# Patient Record
Sex: FEMALE | Race: WHITE | NOT HISPANIC OR LATINO | Employment: FULL TIME | URBAN - NONMETROPOLITAN AREA
[De-identification: names, ages, dates, MRNs, and addresses within clinical notes are randomized per-mention and may not be internally consistent; named-entity substitution may affect disease eponyms.]

---

## 2020-01-03 ENCOUNTER — OFFICE VISIT (OUTPATIENT)
Dept: URGENT CARE | Facility: CLINIC | Age: 45
End: 2020-01-03
Payer: COMMERCIAL

## 2020-01-03 VITALS
HEART RATE: 67 BPM | SYSTOLIC BLOOD PRESSURE: 100 MMHG | HEIGHT: 66 IN | DIASTOLIC BLOOD PRESSURE: 74 MMHG | OXYGEN SATURATION: 96 % | WEIGHT: 227 LBS | RESPIRATION RATE: 14 BRPM | TEMPERATURE: 97.7 F | BODY MASS INDEX: 36.48 KG/M2

## 2020-01-03 DIAGNOSIS — B96.89 ACUTE BACTERIAL SINUSITIS: ICD-10-CM

## 2020-01-03 DIAGNOSIS — R05.9 COUGH: ICD-10-CM

## 2020-01-03 DIAGNOSIS — J01.90 ACUTE BACTERIAL SINUSITIS: ICD-10-CM

## 2020-01-03 PROCEDURE — 99204 OFFICE O/P NEW MOD 45 MIN: CPT | Performed by: FAMILY MEDICINE

## 2020-01-03 RX ORDER — LEVOTHYROXINE SODIUM 0.15 MG/1
TABLET ORAL
COMMUNITY
Start: 2019-12-20 | End: 2022-10-11

## 2020-01-03 RX ORDER — BUPROPION HYDROCHLORIDE 150 MG/1
300 TABLET ORAL EVERY MORNING
COMMUNITY
Start: 2019-12-20 | End: 2023-07-19

## 2020-01-03 RX ORDER — PROMETHAZINE HYDROCHLORIDE AND CODEINE PHOSPHATE 6.25; 1 MG/5ML; MG/5ML
SYRUP ORAL
Qty: 140 ML | Refills: 0 | Status: SHIPPED | OUTPATIENT
Start: 2020-01-03 | End: 2020-01-10

## 2020-01-03 RX ORDER — AZITHROMYCIN 250 MG/1
TABLET, FILM COATED ORAL
Qty: 6 TAB | Refills: 0 | Status: SHIPPED | OUTPATIENT
Start: 2020-01-03 | End: 2022-10-11

## 2020-01-04 NOTE — PROGRESS NOTES
Chief Complaint:    Chief Complaint   Patient presents with   • Cough   • Sinus Problem       History of Present Illness:    This is a new problem. Symptoms x 2 weeks. Has subjective fever, body aches, discomfort in sinus regions, post-nasal drainage, and cough (interferes with sleep). Using Nyquil with sub-optimal help. Overall at least moderate severity and not getting better. She gets similar symptoms about once a year - she recently moved back from [place] and was being treated there. Z-abraham and Promethazine-Codeine have worked/tolerated in past for similar symptoms.      Review of Systems:    Constitutional: See HPI.  Eyes: Negative for change in vision, photophobia, pain, redness, and discharge.  ENT: See HPI.  Respiratory: See HPI.  Cardiovascular: Negative for chest pain, palpitations, orthopnea, claudication, leg swelling, and PND.   Gastrointestinal: Negative for abdominal pain, nausea, vomiting, diarrhea, constipation, blood in stool, and melena.   Genitourinary: Negative for dysuria, urinary urgency, urinary frequency, hematuria, and flank pain.   Musculoskeletal: See HPI.  Skin: Negative for rash and itching.   Neurological: Negative for dizziness, tingling, tremors, sensory change, speech change, focal weakness, seizures, and loss of consciousness.   Endo: Hypothyroid, on medication.  Heme: Does not bruise/bleed easily.   Psychiatric/Behavioral: No new symptoms.       Past Medical History:    Past Medical History:   Diagnosis Date   • Post-surgical hypothyroidism 2012    Thyroid cancer - papillary      Past Surgical History:    Past Surgical History:   Procedure Laterality Date   • CHOLECYSTECTOMY  2009   • THYROIDECTOMY       Social History:    Social History     Socioeconomic History   • Marital status: Single     Spouse name: Not on file   • Number of children: Not on file   • Years of education: Not on file   • Highest education level: Not on file   Occupational History     Comment: [employer]      Social Needs   • Financial resource strain: Not on file   • Food insecurity:     Worry: Not on file     Inability: Not on file   • Transportation needs:     Medical: Not on file     Non-medical: Not on file   Tobacco Use   • Smoking status: Former Smoker     Packs/day: 0.00     Types: Cigarettes     Last attempt to quit: 1/16/2009     Years since quitting: 10.9   • Smokeless tobacco: Never Used   • Tobacco comment: social   Substance and Sexual Activity   • Alcohol use: Yes     Comment: occ   • Drug use: No   • Sexual activity: Not on file     Comment: singe   Lifestyle   • Physical activity:     Days per week: Not on file     Minutes per session: Not on file   • Stress: Not on file   Relationships   • Social connections:     Talks on phone: Not on file     Gets together: Not on file     Attends Voodoo service: Not on file     Active member of club or organization: Not on file     Attends meetings of clubs or organizations: Not on file     Relationship status: Not on file   • Intimate partner violence:     Fear of current or ex partner: Not on file     Emotionally abused: Not on file     Physically abused: Not on file     Forced sexual activity: Not on file   Other Topics Concern   • Not on file   Social History Narrative   • Not on file     Family History:    Family History   Problem Relation Age of Onset   • Hyperlipidemia Mother    • Hyperlipidemia Father    • Psychiatric Illness Father         depression   • Cancer Maternal Uncle         adrenal    • Cancer Maternal Grandmother         breast, lung   • Heart Disease Maternal Grandfather    • Hypertension Maternal Grandfather    • Hyperlipidemia Maternal Grandfather    • Diabetes Paternal Grandmother    • Heart Disease Paternal Grandfather    • Hypertension Paternal Grandfather    • Hyperlipidemia Paternal Grandfather    • Stroke Neg Hx      Medications:    Current Outpatient Medications on File Prior to Visit   Medication Sig Dispense  "Refill   • buPROPion (WELLBUTRIN XL) 150 MG XL tablet TK 1 T PO QD     • levothyroxine (SYNTHROID) 150 MCG Tab TK 1 T PO QD       No current facility-administered medications on file prior to visit.      Allergies:    Allergies   Allergen Reactions   • Morphine Hives       Vitals:    Vitals:    01/03/20 1556   BP: 100/74   BP Location: Right arm   Patient Position: Sitting   Pulse: 67   Resp: 14   Temp: 36.5 °C (97.7 °F)   SpO2: 96%   Weight: 103 kg (227 lb)   Height: 1.676 m (5' 6\")       Physical Exam:    Constitutional: Vital signs reviewed. Appears well-developed and well-nourished. No acute distress.   Eyes: Sclera white, conjunctivae clear.  ENT: TTP bilateral frontal and maxillary sinus regions. External ears normal. External auditory canals normal without discharge. TMs translucent and non-bulging. Hearing normal. Nasal mucosa erythematous. Lips/teeth are normal. Oral mucosa pink and moist. Posterior pharynx: WNL.  Neck: Neck supple.   Cardiovascular: Regular rate and rhythm. No murmur.  Pulmonary/Chest: Respirations non-labored. Clear to auscultation bilaterally.  Lymph: Cervical nodes without tenderness or enlargement.  Musculoskeletal: Normal gait. Normal range of motion. No muscular atrophy or weakness.  Neurological: Alert and oriented to person, place, and time. Muscle tone normal. Coordination normal.   Skin: No rashes or lesions. Warm, dry, normal turgor.  Psychiatric: Normal mood and affect. Behavior is normal. Judgment and thought content normal.       Assessment / Plan:    1. Acute bacterial sinusitis  - azithromycin (ZITHROMAX) 250 MG Tab; 2 TABS BY MOUTH ON DAY 1, 1 TAB ON DAYS 2-5.  Dispense: 6 Tab; Refill: 0    2. Cough  - promethazine-codeine (PHENERGAN-CODEINE) 6.25-10 MG/5ML Syrup; 5 ML BY MOUTH EVERY 6 HOURS ONLY IF NEEDED FOR COUGH FOR UP TO 7 DAYS. MAY CAUSE DROWSINESS.  Dispense: 140 mL; Refill: 0      Discussed with her DDX, management options, and risks, benefits, and alternatives to " treatment plan agreed upon.    Agreeable to medications prescribed.  report checked - no Rx x 3 years.    Discussed expected course of duration, time for improvement, and to seek follow-up in Emergency Room, urgent care, or with PCP if getting worse at any time or not improving within expected time frame.

## 2022-10-11 ENCOUNTER — TELEPHONE (OUTPATIENT)
Dept: SCHEDULING | Facility: IMAGING CENTER | Age: 47
End: 2022-10-11

## 2022-10-11 ENCOUNTER — OFFICE VISIT (OUTPATIENT)
Dept: MEDICAL GROUP | Facility: LAB | Age: 47
End: 2022-10-11
Payer: COMMERCIAL

## 2022-10-11 VITALS
WEIGHT: 222.9 LBS | RESPIRATION RATE: 14 BRPM | TEMPERATURE: 97.3 F | OXYGEN SATURATION: 95 % | BODY MASS INDEX: 35.82 KG/M2 | HEIGHT: 66 IN | DIASTOLIC BLOOD PRESSURE: 74 MMHG | HEART RATE: 72 BPM | SYSTOLIC BLOOD PRESSURE: 116 MMHG

## 2022-10-11 DIAGNOSIS — F32.A ANXIETY AND DEPRESSION: ICD-10-CM

## 2022-10-11 DIAGNOSIS — T75.3XXA MOTION SICKNESS, INITIAL ENCOUNTER: ICD-10-CM

## 2022-10-11 DIAGNOSIS — F41.9 ANXIETY AND DEPRESSION: ICD-10-CM

## 2022-10-11 DIAGNOSIS — Z12.4 SCREENING FOR CERVICAL CANCER: ICD-10-CM

## 2022-10-11 DIAGNOSIS — G43.119 INTRACTABLE MIGRAINE WITH AURA WITHOUT STATUS MIGRAINOSUS: ICD-10-CM

## 2022-10-11 DIAGNOSIS — M26.621 ARTHRALGIA OF RIGHT TEMPOROMANDIBULAR JOINT: ICD-10-CM

## 2022-10-11 DIAGNOSIS — K21.9 GASTROESOPHAGEAL REFLUX DISEASE WITHOUT ESOPHAGITIS: ICD-10-CM

## 2022-10-11 DIAGNOSIS — E89.0 POST-SURGICAL HYPOTHYROIDISM: ICD-10-CM

## 2022-10-11 PROBLEM — M26.629 TMJ ARTHRALGIA: Status: ACTIVE | Noted: 2022-10-11

## 2022-10-11 PROBLEM — C73 THYROID CANCER (HCC): Status: ACTIVE | Noted: 2020-11-17

## 2022-10-11 PROCEDURE — 99214 OFFICE O/P EST MOD 30 MIN: CPT | Performed by: NURSE PRACTITIONER

## 2022-10-11 RX ORDER — METHYLPREDNISOLONE 4 MG/1
TABLET ORAL
COMMUNITY
Start: 2022-10-06 | End: 2022-10-11

## 2022-10-11 RX ORDER — CETIRIZINE HYDROCHLORIDE 10 MG/1
10 TABLET ORAL EVERY MORNING
COMMUNITY

## 2022-10-11 RX ORDER — DICYCLOMINE HYDROCHLORIDE 10 MG/1
10 CAPSULE ORAL EVERY 6 HOURS PRN
COMMUNITY

## 2022-10-11 RX ORDER — SCOLOPAMINE TRANSDERMAL SYSTEM 1 MG/1
1 PATCH, EXTENDED RELEASE TRANSDERMAL
Qty: 4 PATCH | Refills: 0 | Status: SHIPPED | OUTPATIENT
Start: 2022-10-11 | End: 2023-03-02

## 2022-10-11 RX ORDER — LIOTHYRONINE SODIUM 5 UG/1
5 TABLET ORAL EVERY MORNING
COMMUNITY
Start: 2022-10-10

## 2022-10-11 RX ORDER — PANTOPRAZOLE SODIUM 40 MG/1
40 TABLET, DELAYED RELEASE ORAL
COMMUNITY
End: 2023-07-19

## 2022-10-11 RX ORDER — AMOXICILLIN 500 MG/1
CAPSULE ORAL
COMMUNITY
Start: 2022-10-06 | End: 2022-11-30

## 2022-10-11 RX ORDER — GABAPENTIN 100 MG/1
100 CAPSULE ORAL 2 TIMES DAILY PRN
Qty: 20 CAPSULE | Refills: 0 | Status: SHIPPED | OUTPATIENT
Start: 2022-10-11 | End: 2022-10-18 | Stop reason: SDUPTHER

## 2022-10-11 RX ORDER — VALACYCLOVIR HYDROCHLORIDE 1 G/1
1000 TABLET, FILM COATED ORAL 3 TIMES DAILY
Qty: 21 TABLET | Refills: 0 | Status: SHIPPED | OUTPATIENT
Start: 2022-10-11 | End: 2022-10-18

## 2022-10-11 RX ORDER — LEVOTHYROXINE SODIUM 175 UG/1
175 TABLET ORAL SEE ADMIN INSTRUCTIONS
COMMUNITY
Start: 2022-08-11

## 2022-10-11 ASSESSMENT — PATIENT HEALTH QUESTIONNAIRE - PHQ9: CLINICAL INTERPRETATION OF PHQ2 SCORE: 0

## 2022-10-11 NOTE — ASSESSMENT & PLAN NOTE
This is a chronic condition. Patient is currently taking wellbutrin  mg daily with relief, denies side effects. She will also take trazodone 50 mg nightly as needed to help with insomnia due to anxiety.  Denies suicidal or homicidal ideation.  No complaints at this time.

## 2022-10-11 NOTE — ASSESSMENT & PLAN NOTE
This is a chronic condition. Currently taking pantoprazole 40 mg daily with relief. Currently followed by gastroenterology. Patient denies heartburn, nausea, vomiting, unintentional weight loss, or fatigue at this time.

## 2022-10-11 NOTE — ASSESSMENT & PLAN NOTE
Followed by endocrinology. Taking medicine as directed. Denies palpitations, skin changes, temperature intolerance, changes in bowel habits.

## 2022-10-11 NOTE — ASSESSMENT & PLAN NOTE
This is a chronic condition, new to me. Patient will take imitrex 100 mg as abortive therapy. Will also take zofran as needed. Reports that she has not had a migraine in about a year now.

## 2022-10-11 NOTE — PROGRESS NOTES
"Subjective:     CC:    Chief Complaint   Patient presents with    Establish Care    Jaw Pain     R side     Medication Management     HISTORY OF THE PRESENT ILLNESS: Patient is a 47 y.o. female. This pleasant patient is here today to establish care and discuss the following:    TMJ arthralgia  Onset Tuesday. She was seen at the dentist, she had a filling, but overnight the pain got progressively worse.   She went back to the dentist, they were unable to find anything, but was sent to the oral surgeon for a possible vertical root fracture.   She was seen at the oral surgeon, but there was no indication of root fracture and did not recommend extraction.  She was seen at the endodontist, who also did not find any abnormalities.   There is now concern for trigeminal neuralgia.   Symptoms include sharp \"electrical\" pain along the right jaw line.   Now she is having a dull ache in her chin on the right side. Pain is constant, with worsening flares. There is numbness and tingling. Skin is also tender. Denies pain with tooth brushing, no sensitivity to hot or cold. Does feel pain with yawning. No rash, palsy.   She does not clench her teeth at night.  Denies any recent illness, fever, chills, N/V.   Oral surgeon prescribed an antibiotic and steroid, which she finished today.     Post-surgical hypothyroidism  Followed by endocrinology. Taking medicine as directed. Denies palpitations, skin changes, temperature intolerance, changes in bowel habits.    Migraines  This is a chronic condition, new to me. Patient will take imitrex 100 mg as abortive therapy. Will also take zofran as needed. Reports that she has not had a migraine in about a year now.     Anxiety and depression  This is a chronic condition. Patient is currently taking wellbutrin  mg daily with relief, denies side effects. She will also take trazodone 50 mg nightly as needed to help with insomnia due to anxiety.  Denies suicidal or homicidal ideation.  No " "complaints at this time.    Gastroesophageal reflux disease without esophagitis  This is a chronic condition. Currently taking pantoprazole 40 mg daily with relief. Currently followed by gastroenterology. Patient denies heartburn, nausea, vomiting, unintentional weight loss, or fatigue at this time.    ROS:   As documented in history of present illness above      Objective:     Exam: /74 (BP Location: Left arm, Patient Position: Sitting, BP Cuff Size: Adult)   Pulse 72   Temp 36.3 °C (97.3 °F)   Resp 14   Ht 1.676 m (5' 6\")   Wt 101 kg (222 lb 14.4 oz)   SpO2 95%  Body mass index is 35.98 kg/m².    Constitutional: Alert, no distress, well-groomed.  Skin: Warm, dry, good turgor, no rashes in visible areas.  Eye: Equal, round and reactive, conjunctiva clear, lids normal.  ENMT: Lips without lesions, good dentition, moist mucous membranes.  Neck: Trachea midline, no masses, no thyromegaly.  Respiratory: Unlabored respiratory effort, no cough. Clear to ausculation. No rales, ronchi, or wheezing.  Cardiovascular: Regular rate and rhythm without murmur. Carotid and radial pulses are intact and equal bilaterally.  Neuro: CN II-XII intact, strength 5/5 in all muscle groups, sensation intact bilaterally  MSK: Normal gait, moves all extremities.  Neuro: Grossly non-focal.   Psych: Alert and oriented x3, normal affect and mood.    Assessment & Plan:   47 y.o. female with the following -    1. Arthralgia of right temporomandibular joint  Patient to take medication as prescribed. Side effects of medication prescribed today were discussed with the patient including how to take the medication and proper dosage. Discussed repercussions of not taking the medication as prescribed. Instructed to call the office should she have any negative side effects or problems with the medication. Supportive care, differential diagnoses, and indications for immediate follow-up discussed with patient. Instructed to return to clinic or " nearest emergency department for any change in condition, further concerns, or worsening of symptoms.  - valacyclovir (VALTREX) 1 GM Tab; Take 1 Tablet by mouth 3 times a day for 7 days.  Dispense: 21 Tablet; Refill: 0  - gabapentin (NEURONTIN) 100 MG Cap; Take 1 Capsule by mouth 2 times a day as needed (pain).  Dispense: 20 Capsule; Refill: 0    2. Motion sickness, initial encounter  Patient will be going on a cruise and would like a refill of scopolamine patches for motion sickness.   - scopolamine (TRANSDERM-SCOP, 1.5 MG,) 1 mg/72hr PATCH 72 HR; Place 1 Patch on the skin every 72 hours.  Dispense: 4 Patch; Refill: 0    3. Post-surgical hypothyroidism  Continue thyroid medication.  Instructed patient to take on empty stomach with glass of water, 30 minutes prior to food or other medications.  Labs as indicated.  Continue to follow-up with endocrinology.    4. Intractable migraine with aura without status migrainosus  Chronic, stable condition.  Patient to take medication as prescribed. Encourage patient to keep a headache diary to identify triggers.  Low tyramine diet recommended.  Decrease caffeine, maintain regular sleep schedule and at least 30-minutes of exercise most days.    5. Anxiety and depression  Patient is feeling well on current medications.  Will continue.  Denies any suicidal or homicidal ideation. Discussed that should the patient have any symptoms they should call suicide prevention hotline or report to the emergency room immediately. Emphasized importance of healthy diet and exercise.    - CBC WITH DIFFERENTIAL; Future  - Comp Metabolic Panel; Future  - Lipid Profile; Future  - VITAMIN D,25 HYDROXY (DEFICIENCY); Future    6. Gastroesophageal reflux disease without esophagitis  This is a chronic and stable problem.  Patient is doing well.  No red flags.  Continue PPI and monitor.  - CBC WITH DIFFERENTIAL; Future  - Comp Metabolic Panel; Future  - Lipid Profile; Future  - VITAMIN D,25 HYDROXY  (DEFICIENCY); Future    7. Screening for cervical cancer  Referral placed to gynecology.  - Referral to Gynecology    Please note that this dictation was created using voice recognition software. I have made every reasonable attempt to correct obvious errors, but I expect that there are errors of grammar and possibly content that I did not discover before finalizing the note.

## 2022-10-11 NOTE — ASSESSMENT & PLAN NOTE
"Onset Tuesday. She was seen at the dentist, she had a filling, but overnight the pain got progressively worse.   She went back to the dentist, they were unable to find anything, but was sent to the oral surgeon for a possible vertical root fracture.   She was seen at the oral surgeon, but there was no indication of root fracture and did not recommend extraction.  She was seen at the endodontist, who also did not find any abnormalities.   There is now concern for trigeminal neuralgia.   Symptoms include sharp \"electrical\" pain along the right jaw line.   Now she is having a dull ache in her chin on the right side. Pain is constant, with worsening flares. There is numbness and tingling. Skin is also tender. Denies pain with tooth brushing, no sensitivity to hot or cold. Does feel pain with yawning. No rash, palsy.   She does not clench her teeth at night.  Denies any recent illness, fever, chills, N/V.   Oral surgeon prescribed an antibiotic and steroid, which she finished today.   "

## 2022-10-13 ENCOUNTER — PATIENT MESSAGE (OUTPATIENT)
Dept: MEDICAL GROUP | Facility: LAB | Age: 47
End: 2022-10-13
Payer: COMMERCIAL

## 2022-10-13 DIAGNOSIS — G50.0 TRIGEMINAL NEURALGIA OF RIGHT SIDE OF FACE: ICD-10-CM

## 2022-10-13 DIAGNOSIS — M26.621 ARTHRALGIA OF RIGHT TEMPOROMANDIBULAR JOINT: ICD-10-CM

## 2022-10-18 ENCOUNTER — OFFICE VISIT (OUTPATIENT)
Dept: MEDICAL GROUP | Facility: LAB | Age: 47
End: 2022-10-18
Payer: COMMERCIAL

## 2022-10-18 VITALS
SYSTOLIC BLOOD PRESSURE: 124 MMHG | BODY MASS INDEX: 36.48 KG/M2 | TEMPERATURE: 97.7 F | OXYGEN SATURATION: 96 % | HEART RATE: 74 BPM | RESPIRATION RATE: 14 BRPM | WEIGHT: 227 LBS | DIASTOLIC BLOOD PRESSURE: 76 MMHG | HEIGHT: 66 IN

## 2022-10-18 DIAGNOSIS — M26.621 ARTHRALGIA OF RIGHT TEMPOROMANDIBULAR JOINT: ICD-10-CM

## 2022-10-18 DIAGNOSIS — G50.0 TRIGEMINAL NEURALGIA OF RIGHT SIDE OF FACE: ICD-10-CM

## 2022-10-18 PROCEDURE — 99214 OFFICE O/P EST MOD 30 MIN: CPT | Performed by: NURSE PRACTITIONER

## 2022-10-18 RX ORDER — CYCLOBENZAPRINE HCL 5 MG
5 TABLET ORAL DAILY
Qty: 30 TABLET | Refills: 0 | Status: SHIPPED | OUTPATIENT
Start: 2022-10-18 | End: 2022-11-02

## 2022-10-18 RX ORDER — GABAPENTIN 100 MG/1
100 CAPSULE ORAL 3 TIMES DAILY
Qty: 90 CAPSULE | Refills: 1 | Status: SHIPPED | OUTPATIENT
Start: 2022-10-18 | End: 2022-12-27

## 2022-10-18 NOTE — PROGRESS NOTES
"Subjective:     CC:   Chief Complaint   Patient presents with    Follow-Up     Jaw pain      HPI:   Ifrah presents today with the following:    TMJ arthralgia  Follow up. Patient reports that since starting the antiviral, she has noticed a decrease in symptoms. She reports that symptoms are now intermittent, but when it flares it is more painful.   She was also prescribed gabapentin which has helped some as well.   She is feeling a burning pain in different areas, sometimes by her jaw, sometimes on her lips.   There is numbness/tingling, skin is tender.   Denies rash, palsy.   She does not clench teeth at night, but does report that she might clench when she is stressed.   Denies recent illness, fever, chills, N/V.     ROS:   As documented in history of present illness above    Objective:     Exam: /76 (BP Location: Right arm, Patient Position: Sitting, BP Cuff Size: Adult)   Pulse 74   Temp 36.5 °C (97.7 °F)   Resp 14   Ht 1.676 m (5' 6\")   Wt 103 kg (227 lb)   SpO2 96%  Body mass index is 36.64 kg/m².    Constitutional: Alert, no distress, well-groomed.  Skin: Warm, dry, good turgor, no rashes in visible areas.  Eye: Equal, round and reactive, conjunctiva clear, lids normal.  ENMT: Lips without lesions, good dentition, moist mucous membranes.  Neck: Trachea midline, no masses, no thyromegaly.  Respiratory: Unlabored respiratory effort, no cough.  MSK: Normal gait, moves all extremities.  Neuro: CN II-XII intact, strength 5/5 in all muscle groups, sensation intact bilaterally, coordination intact bilaterally  Psych: Alert and oriented x3, normal affect and mood.    Assessment & Plan:     47 y.o. female with the following -     1. Arthralgia of right temporomandibular joint  2. Trigeminal neuralgia of right side of face  Patient to take medication as prescribed. Side effects of medication prescribed today were discussed with the patient including how to take the medication and proper dosage. Discussed " repercussions of not taking the medication as prescribed. Instructed to call the office should she have any negative side effects or problems with the medication. Supportive care, differential diagnoses, and indications for immediate follow-up discussed with patient. Instructed to return to clinic or nearest emergency department for any change in condition, further concerns, or worsening of symptoms.  - gabapentin (NEURONTIN) 100 MG Cap; Take 1 Capsule by mouth 3 times a day for 60 days.  Dispense: 90 Capsule; Refill: 1  - cyclobenzaprine (FLEXERIL) 5 mg tablet; Take 1 Tablet by mouth every day.  Dispense: 30 Tablet; Refill: 0  - Referral to Neurology

## 2022-10-18 NOTE — ASSESSMENT & PLAN NOTE
Follow up. Patient reports that since starting the antiviral, she has noticed a decrease in symptoms. She reports that symptoms are now intermittent, but when it flares it is more painful.   She was also prescribed gabapentin which has helped some as well.   She is feeling a burning pain in different areas, sometimes by her jaw, sometimes on her lips.   There is numbness/tingling, skin is tender.   Denies rash, palsy.   She does not clench teeth at night, but does report that she might clench when she is stressed.   Denies recent illness, fever, chills, N/V.

## 2022-11-02 DIAGNOSIS — M26.621 ARTHRALGIA OF RIGHT TEMPOROMANDIBULAR JOINT: ICD-10-CM

## 2022-11-02 DIAGNOSIS — G50.0 TRIGEMINAL NEURALGIA OF RIGHT SIDE OF FACE: ICD-10-CM

## 2022-11-02 RX ORDER — BACLOFEN 10 MG/1
10 TABLET ORAL 3 TIMES DAILY
Qty: 90 TABLET | Refills: 1 | Status: SHIPPED | OUTPATIENT
Start: 2022-11-02 | End: 2022-11-30

## 2022-11-07 RX ORDER — CARBAMAZEPINE 100 MG/1
100 TABLET, EXTENDED RELEASE ORAL 2 TIMES DAILY
Qty: 60 TABLET | Refills: 3 | Status: SHIPPED | OUTPATIENT
Start: 2022-11-07 | End: 2022-11-30 | Stop reason: SDUPTHER

## 2022-11-30 ENCOUNTER — HOSPITAL ENCOUNTER (OUTPATIENT)
Dept: LAB | Facility: MEDICAL CENTER | Age: 47
End: 2022-11-30
Attending: PSYCHIATRY & NEUROLOGY
Payer: COMMERCIAL

## 2022-11-30 ENCOUNTER — OFFICE VISIT (OUTPATIENT)
Dept: NEUROLOGY | Facility: MEDICAL CENTER | Age: 47
End: 2022-11-30
Attending: PSYCHIATRY & NEUROLOGY
Payer: COMMERCIAL

## 2022-11-30 VITALS
DIASTOLIC BLOOD PRESSURE: 89 MMHG | BODY MASS INDEX: 38.27 KG/M2 | RESPIRATION RATE: 14 BRPM | HEART RATE: 81 BPM | HEIGHT: 66 IN | WEIGHT: 238.1 LBS | SYSTOLIC BLOOD PRESSURE: 130 MMHG | OXYGEN SATURATION: 96 % | TEMPERATURE: 98.8 F

## 2022-11-30 DIAGNOSIS — G50.0 TRIGEMINAL NEURALGIA OF RIGHT SIDE OF FACE: ICD-10-CM

## 2022-11-30 LAB — ERYTHROCYTE [SEDIMENTATION RATE] IN BLOOD BY WESTERGREN METHOD: 4 MM/HOUR (ref 0–25)

## 2022-11-30 PROCEDURE — 36415 COLL VENOUS BLD VENIPUNCTURE: CPT

## 2022-11-30 PROCEDURE — 83516 IMMUNOASSAY NONANTIBODY: CPT

## 2022-11-30 PROCEDURE — 85652 RBC SED RATE AUTOMATED: CPT

## 2022-11-30 PROCEDURE — 86225 DNA ANTIBODY NATIVE: CPT

## 2022-11-30 PROCEDURE — 86235 NUCLEAR ANTIGEN ANTIBODY: CPT | Mod: 91

## 2022-11-30 PROCEDURE — 99205 OFFICE O/P NEW HI 60 MIN: CPT | Performed by: PSYCHIATRY & NEUROLOGY

## 2022-11-30 PROCEDURE — 99212 OFFICE O/P EST SF 10 MIN: CPT | Performed by: PSYCHIATRY & NEUROLOGY

## 2022-11-30 RX ORDER — CARBAMAZEPINE 100 MG/1
200 TABLET, EXTENDED RELEASE ORAL 2 TIMES DAILY
Qty: 60 TABLET | Refills: 3
Start: 2022-11-30 | End: 2022-12-13 | Stop reason: SDUPTHER

## 2022-11-30 ASSESSMENT — ENCOUNTER SYMPTOMS
DIZZINESS: 0
HEADACHES: 0
FOCAL WEAKNESS: 0
SEIZURES: 0
LOSS OF CONSCIOUSNESS: 0
MEMORY LOSS: 0
SPEECH CHANGE: 1
FALLS: 0
SENSORY CHANGE: 1
TREMORS: 0

## 2022-11-30 NOTE — PROGRESS NOTES
Subjective     Ifrah Kelly is a 47 y.o. female who presents with her  Robinson, from the office of JIMMIE Woods, for consultation, with a 2-month history of progressive right facial pain suggestive of trigeminal neuralgia.     THOR Flores is a pleasant, though unfortunate, 47-year-old right-handed young woman who symptoms started spontaneously in the morning while she was brushing her teeth.  About 2 months ago, she noted a hypersensitivity over the right cheek tracing the mandible.  The pain was lancinating and excruciating in intensity, made worse with brushing her teeth as well as talking, chewing, gritting her teeth and even exposure to ambient cold temperatures.  She notes the sensitivities over time.  Symptoms have never really resolved completely.    There was never any rash or trauma.  Symptoms now seem to have spread proximally and can radiate up to the angle of the jaw ipsilaterally.  He can also involve the maxillary region and upper lip.  The longer she talks or chews, she notes some of the symptom intensity attenuates (acclamation).    She denies any symptoms on the left side of the face, nor ipsilateral autonomic symptoms, headache, tinnitus, hearing changes, focal motor or sensory disturbances below the neck, etc.  There is no history of similar symptoms in the past.  The ophthalmic division of the nerve has never been involved.    She is pending MRI of the brain, she was treated with Flexeril and baclofen which provided no benefit, she was also given valacyclovir with the presumption that this could be shingles or rupture.  This provided no benefit.  Started on gabapentin 100 mg 3 times daily, she noted some minimal benefit, Tegretol 100 mg twice daily was then added and there has been some additional improvement seen.  She now presents.    She has a history of thyroid cancer and Hashimoto's disease, migraine headache, depression, no history of MS, seizure, neurodegenerative disease,  "diabetes, other autoimmune disease, liver or kidney disease, glaucoma, blood dyscrasia, CAD, PVD, or CVA.    There is no surgical history of note from my standpoint though she did have some left shoulder pain as a sequelae of her thyroidectomy.  The symptoms have not changed.    Her last menses was in 2015 after she underwent ablation for endometriosis.    No one in the family has a history of similar symptoms or diagnosis of trigeminal neuralgia, MS or stroke.  His maternal grandmother and son both suffer from migraine, her brother suffers from cluster headache.    She does not smoke, occasionally drinks alcohol.  She is the  for the 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    She is on gabapentin 100 mg, 3 times daily, Tegretol 100 mg, twice daily, Wellbutrin XL, Zyrtec, Bentyl, Synthroid, Cytomel, and Protonix.    Review of Systems   Constitutional:  Negative for malaise/fatigue.   HENT:  Negative for ear pain, hearing loss and tinnitus.    Musculoskeletal:  Negative for falls.   Neurological:  Positive for sensory change and speech change. Negative for dizziness, tremors, focal weakness, seizures, loss of consciousness and headaches.   Psychiatric/Behavioral:  Negative for memory loss.    All other systems reviewed and are negative.    Objective     /89 (BP Location: Right arm, Patient Position: Sitting, BP Cuff Size: Adult)   Pulse 81   Temp 37.1 °C (98.8 °F) (Temporal)   Resp 14   Ht 1.676 m (5' 6\")   Wt 108 kg (238 lb 1.6 oz)   SpO2 96%   BMI 38.43 kg/m²      Physical Exam    She appears in some mild distress related to the pain she suffers from every time she talks.  Still, she is cooperative.  Vital signs are stable.  There is no malar rash, proximal jaw or temporal tenderness, though there is tenderness of the distal maxillary and mandibular, perioral regions on the right side of the face.  The external auditory canal on the right is clear.  " There is buildup of cerumen. Chvostek sign is absent bilaterally.  The neck is supple, range of motion is full.  Cardiac evaluation reveals a regular rhythm.  There is no evidence of diffuse joint swelling, rash, or edema.     Neurological Exam    Fully oriented, there is no aphasia, apraxia, or inattention.    PERRLA/EOMI, visual fields are full to move the section on confrontation bilaterally.  There is tenderness and sensitivity of the skin on the right side of the face periorally, there can be radiation proximally up the V3 distribution to the TMJ.  Jaw movements are limited because of pain, grimace as well.  Lip apposition is intact.  Facial movements overall are symmetric.  Sensory exam is intact to temperature, pinprick and light touch.  Shoulder shrug and head rotation are normal.    Musculoskeletal exam reveals normal tone bilaterally, there is no tremor, asterixis, or drift.  Strength is intact at 5/5 throughout.  Reflexes are brisk and present throughout, there are no asymmetries, both toes are downgoing.    She stands easily, armswing is symmetric, gait is normal and station and stride length.  There is no appendicular dystaxia.  Repetitive movements are intact in all 4 extremities.    Sensory exam is intact to vibration, temperature and pinprick.  Romberg is absent.    Assessment & Plan     1. Trigeminal neuralgia of right side of face  Though the symptom presentation is a little unusual, onset of a distal nature with proximal radiation up the V3 and now V2 distributions of the trigeminal nerve, the exacerbating factors, the nature of the pain itself, all suggest trigeminal neuralgia.  This is not unimportant since it can affect decisions moving forwards about symptomatic relief, specifically neuroablative procedures such as CyberKnife, etc.  Compressive lesions do need to be checked, MRA of the head does need to be added to the MRI of the brain.  Some blood work including ESR and CLARISA will be  checked.    We spent some time talking about the nature of this disorder as a pertains to symptomatic conditions as well as the idiopathic form.  Her prognosis is difficult to assess at this time.  Doses of gabapentin and Tegretol are still small, these can be pushed further as long as they are tolerated.  Gabapentin will be continued unchanged, Tegretol will be increased to 200 mg twice daily.  Side effects were reviewed.  She was reassured that they are safe to take together.  We will communicate via Hotlisthart.  We will contact her about test results as they come in, and if they are significant.  Otherwise we talk specifics when we follow-up in the office.    - MR-MRA HEAD-W/O; Future  - Sed Rate; Future  - CLARISA COMPREHENSIVE PANEL  - carBAMazepine SR (TEGRETOL XR) 100 MG TABLET SR 12 HR; Take 2 Tablets by mouth 2 times a day.  Dispense: 60 Tablet; Refill: 3    Time: 60 minutes in total spent on patient care including precharting, record review, discussion with healthcare staff and documentation.  This includes face-to-face time for exam, review, discussion, as well as counseling and coordinating care.

## 2022-12-02 LAB — DSDNA AB TITR SER CLIF: 0 IU (ref 0–24)

## 2022-12-03 LAB — U1 SNRNP IGG SER QL: 2 UNITS (ref 0–19)

## 2022-12-05 LAB — CHROMATIN IGG SERPL-ACNC: 10 UNITS (ref 0–19)

## 2022-12-06 LAB
CENTROMERE IGG TITR SER IF: 5 AU/ML (ref 0–40)
ENA JO1 AB TITR SER: 0 AU/ML (ref 0–40)
ENA SCL70 IGG SER QL: 0 AU/ML (ref 0–40)
ENA SM IGG SER-ACNC: 1 AU/ML (ref 0–40)
ENA SS-B IGG SER IA-ACNC: 0 AU/ML (ref 0–40)
SSA52 R0ENA AB IGG Q0420: 1 AU/ML (ref 0–40)
SSA60 R0ENA AB IGG Q0419: 2 AU/ML (ref 0–40)

## 2022-12-13 DIAGNOSIS — G50.0 TRIGEMINAL NEURALGIA OF RIGHT SIDE OF FACE: ICD-10-CM

## 2022-12-13 RX ORDER — CARBAMAZEPINE 100 MG/1
300 TABLET, EXTENDED RELEASE ORAL 2 TIMES DAILY
Qty: 180 TABLET | Refills: 1 | Status: SHIPPED | OUTPATIENT
Start: 2022-12-13 | End: 2023-04-24

## 2022-12-16 ENCOUNTER — HOSPITAL ENCOUNTER (OUTPATIENT)
Dept: RADIOLOGY | Facility: MEDICAL CENTER | Age: 47
End: 2022-12-16
Attending: PSYCHIATRY & NEUROLOGY
Payer: COMMERCIAL

## 2022-12-16 ENCOUNTER — HOSPITAL ENCOUNTER (OUTPATIENT)
Dept: RADIOLOGY | Facility: MEDICAL CENTER | Age: 47
End: 2022-12-16
Attending: NURSE PRACTITIONER
Payer: COMMERCIAL

## 2022-12-16 DIAGNOSIS — G50.0 TRIGEMINAL NEURALGIA OF RIGHT SIDE OF FACE: ICD-10-CM

## 2022-12-16 DIAGNOSIS — M26.621 ARTHRALGIA OF RIGHT TEMPOROMANDIBULAR JOINT: ICD-10-CM

## 2022-12-16 PROCEDURE — A9576 INJ PROHANCE MULTIPACK: HCPCS

## 2022-12-16 PROCEDURE — 70553 MRI BRAIN STEM W/O & W/DYE: CPT

## 2022-12-16 PROCEDURE — 700117 HCHG RX CONTRAST REV CODE 255

## 2022-12-16 PROCEDURE — 70544 MR ANGIOGRAPHY HEAD W/O DYE: CPT

## 2022-12-16 RX ADMIN — GADOTERIDOL 20 ML: 279.3 INJECTION, SOLUTION INTRAVENOUS at 08:30

## 2022-12-26 DIAGNOSIS — M26.621 ARTHRALGIA OF RIGHT TEMPOROMANDIBULAR JOINT: ICD-10-CM

## 2022-12-27 RX ORDER — GABAPENTIN 100 MG/1
CAPSULE ORAL
Qty: 90 CAPSULE | Refills: 0 | Status: SHIPPED | OUTPATIENT
Start: 2022-12-27 | End: 2023-01-03 | Stop reason: SDUPTHER

## 2022-12-27 NOTE — TELEPHONE ENCOUNTER
Received request via: Pharmacy  10/18/2022lov  Was the patient seen in the last year in this department? Yes    Does the patient have an active prescription (recently filled or refills available) for medication(s) requested? No    Does the patient have skilled nursing Plus and need 100 day supply (blood pressure, diabetes and cholesterol meds only)? Patient does not have SCP

## 2023-01-03 DIAGNOSIS — M26.621 ARTHRALGIA OF RIGHT TEMPOROMANDIBULAR JOINT: ICD-10-CM

## 2023-01-03 RX ORDER — GABAPENTIN 300 MG/1
CAPSULE ORAL
Qty: 84 CAPSULE | Refills: 0 | Status: SHIPPED | OUTPATIENT
Start: 2023-01-03 | End: 2023-01-30 | Stop reason: SDUPTHER

## 2023-01-30 DIAGNOSIS — M26.621 ARTHRALGIA OF RIGHT TEMPOROMANDIBULAR JOINT: ICD-10-CM

## 2023-01-30 RX ORDER — GABAPENTIN 300 MG/1
600 CAPSULE ORAL 2 TIMES DAILY
Qty: 120 CAPSULE | Refills: 1 | Status: SHIPPED | OUTPATIENT
Start: 2023-01-30 | End: 2023-03-02 | Stop reason: SDUPTHER

## 2023-03-01 ENCOUNTER — APPOINTMENT (OUTPATIENT)
Dept: NEUROLOGY | Facility: MEDICAL CENTER | Age: 48
End: 2023-03-01
Attending: PSYCHIATRY & NEUROLOGY

## 2023-03-02 ENCOUNTER — OFFICE VISIT (OUTPATIENT)
Dept: NEUROLOGY | Facility: MEDICAL CENTER | Age: 48
End: 2023-03-02
Attending: PSYCHIATRY & NEUROLOGY
Payer: COMMERCIAL

## 2023-03-02 VITALS
BODY MASS INDEX: 39.58 KG/M2 | WEIGHT: 246.25 LBS | SYSTOLIC BLOOD PRESSURE: 128 MMHG | TEMPERATURE: 97.3 F | HEART RATE: 72 BPM | DIASTOLIC BLOOD PRESSURE: 94 MMHG | OXYGEN SATURATION: 96 % | HEIGHT: 66 IN

## 2023-03-02 DIAGNOSIS — M26.621 ARTHRALGIA OF RIGHT TEMPOROMANDIBULAR JOINT: ICD-10-CM

## 2023-03-02 DIAGNOSIS — G50.0 TRIGEMINAL NEURALGIA OF RIGHT SIDE OF FACE: ICD-10-CM

## 2023-03-02 PROCEDURE — 99212 OFFICE O/P EST SF 10 MIN: CPT | Performed by: PSYCHIATRY & NEUROLOGY

## 2023-03-02 PROCEDURE — 99215 OFFICE O/P EST HI 40 MIN: CPT | Performed by: PSYCHIATRY & NEUROLOGY

## 2023-03-02 RX ORDER — GABAPENTIN 300 MG/1
900 CAPSULE ORAL 2 TIMES DAILY
Qty: 180 CAPSULE | Refills: 3 | Status: SHIPPED | OUTPATIENT
Start: 2023-03-02 | End: 2023-04-10 | Stop reason: SDUPTHER

## 2023-03-02 ASSESSMENT — FIBROSIS 4 INDEX: FIB4 SCORE: 0.73

## 2023-03-02 ASSESSMENT — ENCOUNTER SYMPTOMS
MEMORY LOSS: 1
HEADACHES: 0
FALLS: 0

## 2023-03-02 ASSESSMENT — PATIENT HEALTH QUESTIONNAIRE - PHQ9: CLINICAL INTERPRETATION OF PHQ2 SCORE: 0

## 2023-03-03 NOTE — PROGRESS NOTES
Rachelle Kelly is a 47 y.o. female who presents with her  Todd, for follow-up, with a history of persistent right-sided trigeminal neuralgia, now having difficulty tolerating medications.    THOR Rg states that the pain on the right side of the face remains difficult to [place] though it has diminished significantly on her present medication regimen.  The problem is that she is cognitively dulled, dizzy and lightheaded, and quite sleepy on both medications.  In general she can tolerate some of the triggers that have been problematic at best including chewing, talking, brushing her teeth, etc.  The cold temperatures are less problematic as well.  The distribution is still over the mandible and occasional cheekbone on the right.    MRI of the brain with and without contrast was normal, no evidence of enhancement of the trigeminal nerve.  MRA of the brain also revealed no significant vascular anomalies and compressive lesions.    On Tegretol 300 mg, twice daily, the drug proved effective initially but then that was lost, side effects began to evolve.  Gabapentin 600 mg, twice daily was eventually added, making side effects a little bit worse but also providing more notable benefit.  She recognized this as she found the pain increasing noticeably if she would forget a dose of the latter.    Medical, surgical and family histories are reviewed, there are no new drug allergies.  Other than the gabapentin and Tegretol as above, she remains on Wellbutrin XL, Bentyl, Synthroid, Cytomel and Protonix.    Review of Systems   Constitutional:  Positive for malaise/fatigue.   Musculoskeletal:  Negative for falls.   Neurological:  Negative for headaches.   Psychiatric/Behavioral:  Positive for memory loss.    All other systems reviewed and are negative.    Objective     BP (!) 128/94 (BP Location: Right arm, Patient Position: Sitting, BP Cuff Size: Adult)   Pulse 72   Temp 36.3 °C (97.3 °F)  "(Temporal)   Ht 1.676 m (5' 6\")   Wt 112 kg (246 lb 4.1 oz)   SpO2 96%   BMI 39.75 kg/m²      Physical Exam    She appears in no acute distress.  Vital signs show an elevated blood pressure 128/94, pulse is 72 and rhythm is regular.  There is no malar rash or jaw claudication.  Jaw clench does not elicit symptoms on the right as it had in the past.  There is no rash, there is much less skin hyperpathia over the right cheek.  The neck is supple, range of motion is full.  Cardiac evaluation is unremarkable.     Neurological Exam    Cognition is intact.  Cranial nerve exam again reveals intact facial movements and less of the skin hyperpathia on the right side of the face.  Lip apposition is normal as are facial movements when she smiles.  Eye movements are intact, sensory exam remains intact to light touch.  PERRLA/EOMI and visual fields are full.    Remaining portions of the neurologic exam in quick and cursory fashion include musculoskeletal and coordination assessments, these are intact.    Assessment & Plan     1. Trigeminal neuralgia of right side of face  I will titrate off the Tegretol since it seems to have lost benefit, it certainly is creating a problem with side effects.  We will do this first to see how far we can go before pain begins to increase.  At that point gabapentin will then be adjusted upwards.  Thus, Tegretol will be reduced by 100 mg every week, gabapentin will be increased when needed, she maintains 600 mg twice daily for now.  She knows the side effects to watch for.  She is certainly well versed when it comes to pain.    I contacted Dr. Gelacio La MD, of the [facility], specializing in CyberKnife treatment for trigeminal neuralgia.  He is willing to see her.  She and I will follow-up in the next several months, we will communicate via Cuet to adjust her medications.    - gabapentin (NEURONTIN) 300 MG Cap; Take 3 Capsules by mouth 2 times a day for 120 days.  " Dispense: 180 Capsule; Refill: 3  - Referral to Neurosurgery    Time: 40 minutes in total spent on patient care including pre-charting, record review, discussion with healthcare staff and documentation.  This includes face-to-face time for exam, review, discussion, as well as counseling and coordinating care.

## 2023-03-17 ENCOUNTER — HOSPITAL ENCOUNTER (OUTPATIENT)
Dept: LAB | Facility: MEDICAL CENTER | Age: 48
End: 2023-03-17
Attending: PHYSICIAN ASSISTANT
Payer: COMMERCIAL

## 2023-03-17 ENCOUNTER — HOSPITAL ENCOUNTER (OUTPATIENT)
Dept: LAB | Facility: MEDICAL CENTER | Age: 48
End: 2023-03-17
Attending: NURSE PRACTITIONER
Payer: COMMERCIAL

## 2023-03-17 DIAGNOSIS — K21.9 GASTROESOPHAGEAL REFLUX DISEASE WITHOUT ESOPHAGITIS: ICD-10-CM

## 2023-03-17 DIAGNOSIS — F32.A ANXIETY AND DEPRESSION: ICD-10-CM

## 2023-03-17 DIAGNOSIS — F41.9 ANXIETY AND DEPRESSION: ICD-10-CM

## 2023-03-17 LAB
25(OH)D3 SERPL-MCNC: 55 NG/ML (ref 30–100)
ALBUMIN SERPL BCP-MCNC: 4.4 G/DL (ref 3.2–4.9)
ALBUMIN/GLOB SERPL: 1.5 G/DL
ALP SERPL-CCNC: 65 U/L (ref 30–99)
ALT SERPL-CCNC: 33 U/L (ref 2–50)
ANION GAP SERPL CALC-SCNC: 10 MMOL/L (ref 7–16)
AST SERPL-CCNC: 15 U/L (ref 12–45)
BASOPHILS # BLD AUTO: 0.5 % (ref 0–1.8)
BASOPHILS # BLD: 0.03 K/UL (ref 0–0.12)
BILIRUB SERPL-MCNC: 0.3 MG/DL (ref 0.1–1.5)
BUN SERPL-MCNC: 11 MG/DL (ref 8–22)
CALCIUM ALBUM COR SERPL-MCNC: 9.2 MG/DL (ref 8.5–10.5)
CALCIUM SERPL-MCNC: 9.5 MG/DL (ref 8.5–10.5)
CHLORIDE SERPL-SCNC: 103 MMOL/L (ref 96–112)
CHOLEST SERPL-MCNC: 200 MG/DL (ref 100–199)
CO2 SERPL-SCNC: 23 MMOL/L (ref 20–33)
CREAT SERPL-MCNC: 0.78 MG/DL (ref 0.5–1.4)
EOSINOPHIL # BLD AUTO: 0.08 K/UL (ref 0–0.51)
EOSINOPHIL NFR BLD: 1.3 % (ref 0–6.9)
ERYTHROCYTE [DISTWIDTH] IN BLOOD BY AUTOMATED COUNT: 43.8 FL (ref 35.9–50)
GFR SERPLBLD CREATININE-BSD FMLA CKD-EPI: 94 ML/MIN/1.73 M 2
GLOBULIN SER CALC-MCNC: 3 G/DL (ref 1.9–3.5)
GLUCOSE SERPL-MCNC: 94 MG/DL (ref 65–99)
HCT VFR BLD AUTO: 45.7 % (ref 37–47)
HDLC SERPL-MCNC: 50 MG/DL
HGB BLD-MCNC: 15.2 G/DL (ref 12–16)
IMM GRANULOCYTES # BLD AUTO: 0.01 K/UL (ref 0–0.11)
IMM GRANULOCYTES NFR BLD AUTO: 0.2 % (ref 0–0.9)
LDLC SERPL CALC-MCNC: 131 MG/DL
LYMPHOCYTES # BLD AUTO: 1.75 K/UL (ref 1–4.8)
LYMPHOCYTES NFR BLD: 29.5 % (ref 22–41)
MCH RBC QN AUTO: 29.3 PG (ref 27–33)
MCHC RBC AUTO-ENTMCNC: 33.3 G/DL (ref 33.6–35)
MCV RBC AUTO: 88.2 FL (ref 81.4–97.8)
MONOCYTES # BLD AUTO: 0.4 K/UL (ref 0–0.85)
MONOCYTES NFR BLD AUTO: 6.7 % (ref 0–13.4)
NEUTROPHILS # BLD AUTO: 3.67 K/UL (ref 2–7.15)
NEUTROPHILS NFR BLD: 61.8 % (ref 44–72)
NRBC # BLD AUTO: 0 K/UL
NRBC BLD-RTO: 0 /100 WBC
PLATELET # BLD AUTO: 241 K/UL (ref 164–446)
PMV BLD AUTO: 9.6 FL (ref 9–12.9)
POTASSIUM SERPL-SCNC: 4.4 MMOL/L (ref 3.6–5.5)
PROT SERPL-MCNC: 7.4 G/DL (ref 6–8.2)
RBC # BLD AUTO: 5.18 M/UL (ref 4.2–5.4)
SODIUM SERPL-SCNC: 136 MMOL/L (ref 135–145)
T3 SERPL-MCNC: 92.7 NG/DL (ref 60–181)
T4 FREE SERPL-MCNC: 1.11 NG/DL (ref 0.93–1.7)
TRIGL SERPL-MCNC: 96 MG/DL (ref 0–149)
TSH SERPL DL<=0.005 MIU/L-ACNC: 2.4 UIU/ML (ref 0.38–5.33)
WBC # BLD AUTO: 5.9 K/UL (ref 4.8–10.8)

## 2023-03-17 PROCEDURE — 82306 VITAMIN D 25 HYDROXY: CPT

## 2023-03-17 PROCEDURE — 80061 LIPID PANEL: CPT

## 2023-03-17 PROCEDURE — 80053 COMPREHEN METABOLIC PANEL: CPT

## 2023-03-17 PROCEDURE — 86038 ANTINUCLEAR ANTIBODIES: CPT

## 2023-03-17 PROCEDURE — 85025 COMPLETE CBC W/AUTO DIFF WBC: CPT

## 2023-03-17 PROCEDURE — 84439 ASSAY OF FREE THYROXINE: CPT

## 2023-03-17 PROCEDURE — 84480 ASSAY TRIIODOTHYRONINE (T3): CPT

## 2023-03-17 PROCEDURE — 36415 COLL VENOUS BLD VENIPUNCTURE: CPT

## 2023-03-17 PROCEDURE — 84443 ASSAY THYROID STIM HORMONE: CPT

## 2023-03-19 LAB — NUCLEAR IGG SER QL IA: NORMAL

## 2023-03-21 ENCOUNTER — HOSPITAL ENCOUNTER (OUTPATIENT)
Dept: LAB | Facility: MEDICAL CENTER | Age: 48
End: 2023-03-21
Attending: INTERNAL MEDICINE
Payer: COMMERCIAL

## 2023-03-21 PROCEDURE — 86800 THYROGLOBULIN ANTIBODY: CPT

## 2023-03-21 PROCEDURE — 84432 ASSAY OF THYROGLOBULIN: CPT

## 2023-03-21 PROCEDURE — 36415 COLL VENOUS BLD VENIPUNCTURE: CPT

## 2023-03-23 LAB
THYROGLOB AB SERPL-ACNC: <0.9 IU/ML (ref 0–4)
THYROGLOB SERPL-MCNC: <0.1 NG/ML (ref 1.3–31.8)
THYROGLOB SERPL-MCNC: ABNORMAL NG/ML (ref 1.3–31.8)

## 2023-03-24 ENCOUNTER — APPOINTMENT (OUTPATIENT)
Dept: RADIOLOGY | Facility: MEDICAL CENTER | Age: 48
End: 2023-03-24
Attending: NURSE PRACTITIONER
Payer: COMMERCIAL

## 2023-03-24 DIAGNOSIS — Z12.31 VISIT FOR SCREENING MAMMOGRAM: ICD-10-CM

## 2023-03-24 PROCEDURE — 77067 SCR MAMMO BI INCL CAD: CPT

## 2023-03-27 ENCOUNTER — PATIENT MESSAGE (OUTPATIENT)
Dept: MEDICAL GROUP | Facility: LAB | Age: 48
End: 2023-03-27
Payer: COMMERCIAL

## 2023-04-10 DIAGNOSIS — G50.0 TRIGEMINAL NEURALGIA OF RIGHT SIDE OF FACE: ICD-10-CM

## 2023-04-10 RX ORDER — GABAPENTIN 300 MG/1
600 CAPSULE ORAL 3 TIMES DAILY
Qty: 180 CAPSULE | Refills: 5 | Status: SHIPPED | OUTPATIENT
Start: 2023-04-10 | End: 2023-09-05

## 2023-04-25 ENCOUNTER — APPOINTMENT (OUTPATIENT)
Dept: ADMISSIONS | Facility: MEDICAL CENTER | Age: 48
DRG: 026 | End: 2023-04-25
Attending: NEUROLOGICAL SURGERY
Payer: COMMERCIAL

## 2023-05-04 ENCOUNTER — PRE-ADMISSION TESTING (OUTPATIENT)
Dept: ADMISSIONS | Facility: MEDICAL CENTER | Age: 48
DRG: 026 | End: 2023-05-04
Attending: NEUROLOGICAL SURGERY
Payer: COMMERCIAL

## 2023-05-04 RX ORDER — TRAZODONE HYDROCHLORIDE 50 MG/1
50 TABLET ORAL NIGHTLY PRN
COMMUNITY
End: 2024-02-16 | Stop reason: SDUPTHER

## 2023-05-05 ENCOUNTER — PRE-ADMISSION TESTING (OUTPATIENT)
Dept: ADMISSIONS | Facility: MEDICAL CENTER | Age: 48
End: 2023-05-05
Attending: NEUROLOGICAL SURGERY
Payer: COMMERCIAL

## 2023-05-05 ENCOUNTER — HOSPITAL ENCOUNTER (OUTPATIENT)
Dept: RADIOLOGY | Facility: MEDICAL CENTER | Age: 48
End: 2023-05-05
Attending: NEUROLOGICAL SURGERY
Payer: COMMERCIAL

## 2023-05-05 DIAGNOSIS — Z01.811 PRE-OPERATIVE RESPIRATORY EXAMINATION: ICD-10-CM

## 2023-05-05 DIAGNOSIS — Z01.810 PRE-OPERATIVE CARDIOVASCULAR EXAMINATION: ICD-10-CM

## 2023-05-05 DIAGNOSIS — Z01.812 PRE-OPERATIVE LABORATORY EXAMINATION: ICD-10-CM

## 2023-05-05 LAB
ABO GROUP BLD: NORMAL
ANION GAP SERPL CALC-SCNC: 15 MMOL/L (ref 7–16)
APTT PPP: 25.5 SEC (ref 24.7–36)
BASOPHILS # BLD AUTO: 0.8 % (ref 0–1.8)
BASOPHILS # BLD: 0.05 K/UL (ref 0–0.12)
BLD GP AB SCN SERPL QL: NORMAL
BUN SERPL-MCNC: 20 MG/DL (ref 8–22)
CALCIUM SERPL-MCNC: 9.6 MG/DL (ref 8.5–10.5)
CHLORIDE SERPL-SCNC: 107 MMOL/L (ref 96–112)
CO2 SERPL-SCNC: 24 MMOL/L (ref 20–33)
CREAT SERPL-MCNC: 0.69 MG/DL (ref 0.5–1.4)
EKG IMPRESSION: NORMAL
EOSINOPHIL # BLD AUTO: 0.12 K/UL (ref 0–0.51)
EOSINOPHIL NFR BLD: 1.8 % (ref 0–6.9)
ERYTHROCYTE [DISTWIDTH] IN BLOOD BY AUTOMATED COUNT: 43.5 FL (ref 35.9–50)
GFR SERPLBLD CREATININE-BSD FMLA CKD-EPI: 107 ML/MIN/1.73 M 2
GLUCOSE SERPL-MCNC: 98 MG/DL (ref 65–99)
HCT VFR BLD AUTO: 49 % (ref 37–47)
HGB BLD-MCNC: 16.1 G/DL (ref 12–16)
IMM GRANULOCYTES # BLD AUTO: 0.03 K/UL (ref 0–0.11)
IMM GRANULOCYTES NFR BLD AUTO: 0.5 % (ref 0–0.9)
INR PPP: 1.04 (ref 0.87–1.13)
LYMPHOCYTES # BLD AUTO: 1.85 K/UL (ref 1–4.8)
LYMPHOCYTES NFR BLD: 28 % (ref 22–41)
MCH RBC QN AUTO: 29.6 PG (ref 27–33)
MCHC RBC AUTO-ENTMCNC: 32.9 G/DL (ref 33.6–35)
MCV RBC AUTO: 90.1 FL (ref 81.4–97.8)
MONOCYTES # BLD AUTO: 0.44 K/UL (ref 0–0.85)
MONOCYTES NFR BLD AUTO: 6.7 % (ref 0–13.4)
NEUTROPHILS # BLD AUTO: 4.12 K/UL (ref 2–7.15)
NEUTROPHILS NFR BLD: 62.2 % (ref 44–72)
NRBC # BLD AUTO: 0 K/UL
NRBC BLD-RTO: 0 /100 WBC
PLATELET # BLD AUTO: 274 K/UL (ref 164–446)
PMV BLD AUTO: 9.4 FL (ref 9–12.9)
POTASSIUM SERPL-SCNC: 4.5 MMOL/L (ref 3.6–5.5)
PROTHROMBIN TIME: 13.5 SEC (ref 12–14.6)
RBC # BLD AUTO: 5.44 M/UL (ref 4.2–5.4)
RH BLD: NORMAL
SODIUM SERPL-SCNC: 146 MMOL/L (ref 135–145)
WBC # BLD AUTO: 6.6 K/UL (ref 4.8–10.8)

## 2023-05-05 PROCEDURE — 86901 BLOOD TYPING SEROLOGIC RH(D): CPT

## 2023-05-05 PROCEDURE — 36415 COLL VENOUS BLD VENIPUNCTURE: CPT

## 2023-05-05 PROCEDURE — 80048 BASIC METABOLIC PNL TOTAL CA: CPT

## 2023-05-05 PROCEDURE — 85610 PROTHROMBIN TIME: CPT

## 2023-05-05 PROCEDURE — 86850 RBC ANTIBODY SCREEN: CPT

## 2023-05-05 PROCEDURE — 71046 X-RAY EXAM CHEST 2 VIEWS: CPT

## 2023-05-05 PROCEDURE — 93005 ELECTROCARDIOGRAM TRACING: CPT

## 2023-05-05 PROCEDURE — 85730 THROMBOPLASTIN TIME PARTIAL: CPT

## 2023-05-05 PROCEDURE — 85025 COMPLETE CBC W/AUTO DIFF WBC: CPT

## 2023-05-05 PROCEDURE — 86900 BLOOD TYPING SEROLOGIC ABO: CPT

## 2023-05-05 PROCEDURE — 93010 ELECTROCARDIOGRAM REPORT: CPT | Performed by: INTERNAL MEDICINE

## 2023-05-17 ENCOUNTER — ANESTHESIA EVENT (OUTPATIENT)
Dept: SURGERY | Facility: MEDICAL CENTER | Age: 48
DRG: 026 | End: 2023-05-17
Payer: COMMERCIAL

## 2023-05-18 ENCOUNTER — HOSPITAL ENCOUNTER (INPATIENT)
Facility: MEDICAL CENTER | Age: 48
LOS: 3 days | DRG: 026 | End: 2023-05-21
Attending: NEUROLOGICAL SURGERY | Admitting: NEUROLOGICAL SURGERY
Payer: COMMERCIAL

## 2023-05-18 ENCOUNTER — ANESTHESIA (OUTPATIENT)
Dept: SURGERY | Facility: MEDICAL CENTER | Age: 48
DRG: 026 | End: 2023-05-18
Payer: COMMERCIAL

## 2023-05-18 DIAGNOSIS — Z98.890 S/P CRANIOTOMY: ICD-10-CM

## 2023-05-18 LAB
ABO + RH BLD: NORMAL
HCG UR QL: NEGATIVE

## 2023-05-18 PROCEDURE — 95938 SOMATOSENSORY TESTING: CPT | Performed by: NEUROLOGICAL SURGERY

## 2023-05-18 PROCEDURE — 770022 HCHG ROOM/CARE - ICU (200)

## 2023-05-18 PROCEDURE — C1713 ANCHOR/SCREW BN/BN,TIS/BN: HCPCS | Performed by: NEUROLOGICAL SURGERY

## 2023-05-18 PROCEDURE — 81025 URINE PREGNANCY TEST: CPT

## 2023-05-18 PROCEDURE — 700105 HCHG RX REV CODE 258: Performed by: NEUROLOGICAL SURGERY

## 2023-05-18 PROCEDURE — 700105 HCHG RX REV CODE 258

## 2023-05-18 PROCEDURE — 00211 ANES ICR PX CRNEC/CRNOT HMTM: CPT | Performed by: STUDENT IN AN ORGANIZED HEALTH CARE EDUCATION/TRAINING PROGRAM

## 2023-05-18 PROCEDURE — 700111 HCHG RX REV CODE 636 W/ 250 OVERRIDE (IP): Performed by: NEUROLOGICAL SURGERY

## 2023-05-18 PROCEDURE — 160035 HCHG PACU - 1ST 60 MINS PHASE I: Performed by: NEUROLOGICAL SURGERY

## 2023-05-18 PROCEDURE — 160048 HCHG OR STATISTICAL LEVEL 1-5: Performed by: NEUROLOGICAL SURGERY

## 2023-05-18 PROCEDURE — 700111 HCHG RX REV CODE 636 W/ 250 OVERRIDE (IP): Performed by: STUDENT IN AN ORGANIZED HEALTH CARE EDUCATION/TRAINING PROGRAM

## 2023-05-18 PROCEDURE — 160002 HCHG RECOVERY MINUTES (STAT): Performed by: NEUROLOGICAL SURGERY

## 2023-05-18 PROCEDURE — 95867 NDL EMG CRANIAL NRV MUSC UNI: CPT | Performed by: NEUROLOGICAL SURGERY

## 2023-05-18 PROCEDURE — 700111 HCHG RX REV CODE 636 W/ 250 OVERRIDE (IP): Mod: JW | Performed by: STUDENT IN AN ORGANIZED HEALTH CARE EDUCATION/TRAINING PROGRAM

## 2023-05-18 PROCEDURE — 99291 CRITICAL CARE FIRST HOUR: CPT | Performed by: INTERNAL MEDICINE

## 2023-05-18 PROCEDURE — 00NK0ZZ RELEASE TRIGEMINAL NERVE, OPEN APPROACH: ICD-10-PCS | Performed by: NEUROLOGICAL SURGERY

## 2023-05-18 PROCEDURE — 700101 HCHG RX REV CODE 250: Performed by: NEUROLOGICAL SURGERY

## 2023-05-18 PROCEDURE — 4A11X4G MONITORING OF PERIPHERAL NERVOUS ELECTRICAL ACTIVITY, INTRAOPERATIVE, EXTERNAL APPROACH: ICD-10-PCS | Performed by: NEUROLOGICAL SURGERY

## 2023-05-18 PROCEDURE — 700101 HCHG RX REV CODE 250: Performed by: STUDENT IN AN ORGANIZED HEALTH CARE EDUCATION/TRAINING PROGRAM

## 2023-05-18 PROCEDURE — 95940 IONM IN OPERATNG ROOM 15 MIN: CPT | Performed by: NEUROLOGICAL SURGERY

## 2023-05-18 PROCEDURE — A9270 NON-COVERED ITEM OR SERVICE: HCPCS

## 2023-05-18 PROCEDURE — 160041 HCHG SURGERY MINUTES - EA ADDL 1 MIN LEVEL 4: Performed by: NEUROLOGICAL SURGERY

## 2023-05-18 PROCEDURE — 160009 HCHG ANES TIME/MIN: Performed by: NEUROLOGICAL SURGERY

## 2023-05-18 PROCEDURE — 160036 HCHG PACU - EA ADDL 30 MINS PHASE I: Performed by: NEUROLOGICAL SURGERY

## 2023-05-18 PROCEDURE — 160029 HCHG SURGERY MINUTES - 1ST 30 MINS LEVEL 4: Performed by: NEUROLOGICAL SURGERY

## 2023-05-18 PROCEDURE — 502240 HCHG MISC OR SUPPLY RC 0272: Performed by: NEUROLOGICAL SURGERY

## 2023-05-18 PROCEDURE — 700105 HCHG RX REV CODE 258: Performed by: STUDENT IN AN ORGANIZED HEALTH CARE EDUCATION/TRAINING PROGRAM

## 2023-05-18 PROCEDURE — 110454 HCHG SHELL REV 250: Performed by: NEUROLOGICAL SURGERY

## 2023-05-18 PROCEDURE — 36620 INSERTION CATHETER ARTERY: CPT | Performed by: STUDENT IN AN ORGANIZED HEALTH CARE EDUCATION/TRAINING PROGRAM

## 2023-05-18 PROCEDURE — 110371 HCHG SHELL REV 272: Performed by: NEUROLOGICAL SURGERY

## 2023-05-18 PROCEDURE — 700102 HCHG RX REV CODE 250 W/ 637 OVERRIDE(OP)

## 2023-05-18 PROCEDURE — 700111 HCHG RX REV CODE 636 W/ 250 OVERRIDE (IP)

## 2023-05-18 PROCEDURE — 36415 COLL VENOUS BLD VENIPUNCTURE: CPT

## 2023-05-18 PROCEDURE — 95937 NEUROMUSCULAR JUNCTION TEST: CPT | Performed by: NEUROLOGICAL SURGERY

## 2023-05-18 PROCEDURE — C1768 GRAFT, VASCULAR: HCPCS | Performed by: NEUROLOGICAL SURGERY

## 2023-05-18 DEVICE — PLATE NC DOGBONE 2-HOLE W/O TAB (6NCX4=24): Type: IMPLANTABLE DEVICE | Site: CRANIAL | Status: FUNCTIONAL

## 2023-05-18 DEVICE — SCREW STRYK NC 1.5X4MM (6NCX40=240) CONSIGNED QTY 240 PRE-LOAD 80/PK: Type: IMPLANTABLE DEVICE | Site: CRANIAL | Status: FUNCTIONAL

## 2023-05-18 DEVICE — PLATE NC BURR HOLE COVER 10MM (6NCX6=36): Type: IMPLANTABLE DEVICE | Site: CRANIAL | Status: FUNCTIONAL

## 2023-05-18 RX ORDER — ACETAMINOPHEN 500 MG
500-1000 TABLET ORAL EVERY 6 HOURS PRN
COMMUNITY
End: 2023-08-29

## 2023-05-18 RX ORDER — GABAPENTIN 300 MG/1
600 CAPSULE ORAL 3 TIMES DAILY
Status: DISCONTINUED | OUTPATIENT
Start: 2023-05-18 | End: 2023-05-21 | Stop reason: HOSPADM

## 2023-05-18 RX ORDER — DOCUSATE SODIUM 100 MG/1
100 CAPSULE, LIQUID FILLED ORAL 2 TIMES DAILY
Status: DISCONTINUED | OUTPATIENT
Start: 2023-05-18 | End: 2023-05-21 | Stop reason: HOSPADM

## 2023-05-18 RX ORDER — SODIUM CHLORIDE, SODIUM LACTATE, POTASSIUM CHLORIDE, CALCIUM CHLORIDE 600; 310; 30; 20 MG/100ML; MG/100ML; MG/100ML; MG/100ML
INJECTION, SOLUTION INTRAVENOUS
Status: DISCONTINUED | OUTPATIENT
Start: 2023-05-18 | End: 2023-05-18 | Stop reason: SURG

## 2023-05-18 RX ORDER — CEFAZOLIN SODIUM 1 G/3ML
INJECTION, POWDER, FOR SOLUTION INTRAMUSCULAR; INTRAVENOUS
Status: DISCONTINUED | OUTPATIENT
Start: 2023-05-18 | End: 2023-05-18 | Stop reason: HOSPADM

## 2023-05-18 RX ORDER — AMOXICILLIN 250 MG
1 CAPSULE ORAL NIGHTLY
Status: DISCONTINUED | OUTPATIENT
Start: 2023-05-18 | End: 2023-05-21 | Stop reason: HOSPADM

## 2023-05-18 RX ORDER — ONDANSETRON 2 MG/ML
4 INJECTION INTRAMUSCULAR; INTRAVENOUS EVERY 4 HOURS PRN
Status: DISCONTINUED | OUTPATIENT
Start: 2023-05-18 | End: 2023-05-21 | Stop reason: HOSPADM

## 2023-05-18 RX ORDER — LIOTHYRONINE SODIUM 5 UG/1
5 TABLET ORAL EVERY MORNING
Status: DISCONTINUED | OUTPATIENT
Start: 2023-05-18 | End: 2023-05-21 | Stop reason: HOSPADM

## 2023-05-18 RX ORDER — BISACODYL 10 MG
10 SUPPOSITORY, RECTAL RECTAL
Status: DISCONTINUED | OUTPATIENT
Start: 2023-05-18 | End: 2023-05-21 | Stop reason: HOSPADM

## 2023-05-18 RX ORDER — BUPROPION HYDROCHLORIDE 150 MG/1
150 TABLET, EXTENDED RELEASE ORAL DAILY
Status: DISCONTINUED | OUTPATIENT
Start: 2023-05-18 | End: 2023-05-21 | Stop reason: HOSPADM

## 2023-05-18 RX ORDER — HYDROMORPHONE HYDROCHLORIDE 1 MG/ML
0.1 INJECTION, SOLUTION INTRAMUSCULAR; INTRAVENOUS; SUBCUTANEOUS
Status: DISCONTINUED | OUTPATIENT
Start: 2023-05-18 | End: 2023-05-18 | Stop reason: HOSPADM

## 2023-05-18 RX ORDER — CEFAZOLIN SODIUM 1 G/3ML
INJECTION, POWDER, FOR SOLUTION INTRAMUSCULAR; INTRAVENOUS PRN
Status: DISCONTINUED | OUTPATIENT
Start: 2023-05-18 | End: 2023-05-18 | Stop reason: SURG

## 2023-05-18 RX ORDER — ROCURONIUM BROMIDE 10 MG/ML
INJECTION, SOLUTION INTRAVENOUS PRN
Status: DISCONTINUED | OUTPATIENT
Start: 2023-05-18 | End: 2023-05-18 | Stop reason: SURG

## 2023-05-18 RX ORDER — SODIUM CHLORIDE 9 MG/ML
INJECTION, SOLUTION INTRAVENOUS CONTINUOUS
Status: DISCONTINUED | OUTPATIENT
Start: 2023-05-18 | End: 2023-05-19

## 2023-05-18 RX ORDER — OXYCODONE HYDROCHLORIDE 10 MG/1
10 TABLET ORAL
Status: DISCONTINUED | OUTPATIENT
Start: 2023-05-18 | End: 2023-05-21 | Stop reason: HOSPADM

## 2023-05-18 RX ORDER — REMIFENTANIL HYDROCHLORIDE 1 MG/ML
INJECTION, POWDER, LYOPHILIZED, FOR SOLUTION INTRAVENOUS
Status: DISCONTINUED | OUTPATIENT
Start: 2023-05-18 | End: 2023-05-18 | Stop reason: SURG

## 2023-05-18 RX ORDER — HYDROMORPHONE HYDROCHLORIDE 1 MG/ML
0.2 INJECTION, SOLUTION INTRAMUSCULAR; INTRAVENOUS; SUBCUTANEOUS
Status: DISCONTINUED | OUTPATIENT
Start: 2023-05-18 | End: 2023-05-18 | Stop reason: HOSPADM

## 2023-05-18 RX ORDER — CARBAMAZEPINE 100 MG/1
300 TABLET, EXTENDED RELEASE ORAL 2 TIMES DAILY
Status: DISCONTINUED | OUTPATIENT
Start: 2023-05-18 | End: 2023-05-21 | Stop reason: HOSPADM

## 2023-05-18 RX ORDER — DIPHENHYDRAMINE HYDROCHLORIDE 50 MG/ML
25 INJECTION INTRAMUSCULAR; INTRAVENOUS EVERY 6 HOURS PRN
Status: DISCONTINUED | OUTPATIENT
Start: 2023-05-18 | End: 2023-05-21 | Stop reason: HOSPADM

## 2023-05-18 RX ORDER — OXYCODONE HCL 5 MG/5 ML
10 SOLUTION, ORAL ORAL
Status: DISCONTINUED | OUTPATIENT
Start: 2023-05-18 | End: 2023-05-18 | Stop reason: HOSPADM

## 2023-05-18 RX ORDER — LIDOCAINE HYDROCHLORIDE 20 MG/ML
INJECTION, SOLUTION EPIDURAL; INFILTRATION; INTRACAUDAL; PERINEURAL PRN
Status: DISCONTINUED | OUTPATIENT
Start: 2023-05-18 | End: 2023-05-18 | Stop reason: SURG

## 2023-05-18 RX ORDER — DIPHENHYDRAMINE HYDROCHLORIDE 50 MG/ML
12.5 INJECTION INTRAMUSCULAR; INTRAVENOUS
Status: DISCONTINUED | OUTPATIENT
Start: 2023-05-18 | End: 2023-05-18 | Stop reason: HOSPADM

## 2023-05-18 RX ORDER — MEPERIDINE HYDROCHLORIDE 25 MG/ML
12.5 INJECTION INTRAMUSCULAR; INTRAVENOUS; SUBCUTANEOUS
Status: DISCONTINUED | OUTPATIENT
Start: 2023-05-18 | End: 2023-05-18 | Stop reason: HOSPADM

## 2023-05-18 RX ORDER — BUPIVACAINE HYDROCHLORIDE AND EPINEPHRINE 5; 5 MG/ML; UG/ML
INJECTION, SOLUTION PERINEURAL
Status: DISCONTINUED | OUTPATIENT
Start: 2023-05-18 | End: 2023-05-18 | Stop reason: HOSPADM

## 2023-05-18 RX ORDER — DEXAMETHASONE SODIUM PHOSPHATE 4 MG/ML
4 INJECTION, SOLUTION INTRA-ARTICULAR; INTRALESIONAL; INTRAMUSCULAR; INTRAVENOUS; SOFT TISSUE
Status: COMPLETED | OUTPATIENT
Start: 2023-05-18 | End: 2023-05-18

## 2023-05-18 RX ORDER — POLYETHYLENE GLYCOL 3350 17 G/17G
1 POWDER, FOR SOLUTION ORAL 2 TIMES DAILY PRN
Status: DISCONTINUED | OUTPATIENT
Start: 2023-05-18 | End: 2023-05-21 | Stop reason: HOSPADM

## 2023-05-18 RX ORDER — ONDANSETRON 2 MG/ML
4 INJECTION INTRAMUSCULAR; INTRAVENOUS
Status: COMPLETED | OUTPATIENT
Start: 2023-05-18 | End: 2023-05-18

## 2023-05-18 RX ORDER — LEVOTHYROXINE SODIUM 175 UG/1
175 TABLET ORAL
Status: DISCONTINUED | OUTPATIENT
Start: 2023-05-19 | End: 2023-05-21 | Stop reason: HOSPADM

## 2023-05-18 RX ORDER — HYDROMORPHONE HYDROCHLORIDE 1 MG/ML
0.4 INJECTION, SOLUTION INTRAMUSCULAR; INTRAVENOUS; SUBCUTANEOUS
Status: DISCONTINUED | OUTPATIENT
Start: 2023-05-18 | End: 2023-05-18 | Stop reason: HOSPADM

## 2023-05-18 RX ORDER — SODIUM CHLORIDE, SODIUM LACTATE, POTASSIUM CHLORIDE, CALCIUM CHLORIDE 600; 310; 30; 20 MG/100ML; MG/100ML; MG/100ML; MG/100ML
INJECTION, SOLUTION INTRAVENOUS CONTINUOUS
Status: ACTIVE | OUTPATIENT
Start: 2023-05-18 | End: 2023-05-18

## 2023-05-18 RX ORDER — OXYCODONE HYDROCHLORIDE 5 MG/1
5 TABLET ORAL
Status: DISCONTINUED | OUTPATIENT
Start: 2023-05-18 | End: 2023-05-21 | Stop reason: HOSPADM

## 2023-05-18 RX ORDER — HYDROMORPHONE HYDROCHLORIDE 2 MG/ML
INJECTION, SOLUTION INTRAMUSCULAR; INTRAVENOUS; SUBCUTANEOUS PRN
Status: DISCONTINUED | OUTPATIENT
Start: 2023-05-18 | End: 2023-05-18 | Stop reason: SURG

## 2023-05-18 RX ORDER — AMOXICILLIN 250 MG
1 CAPSULE ORAL
Status: DISCONTINUED | OUTPATIENT
Start: 2023-05-18 | End: 2023-05-21 | Stop reason: HOSPADM

## 2023-05-18 RX ORDER — HYDROMORPHONE HYDROCHLORIDE 1 MG/ML
0.5 INJECTION, SOLUTION INTRAMUSCULAR; INTRAVENOUS; SUBCUTANEOUS
Status: DISCONTINUED | OUTPATIENT
Start: 2023-05-18 | End: 2023-05-21 | Stop reason: HOSPADM

## 2023-05-18 RX ORDER — OMEPRAZOLE 20 MG/1
20 CAPSULE, DELAYED RELEASE ORAL DAILY
Status: DISCONTINUED | OUTPATIENT
Start: 2023-05-18 | End: 2023-05-21 | Stop reason: HOSPADM

## 2023-05-18 RX ORDER — HALOPERIDOL 5 MG/ML
1 INJECTION INTRAMUSCULAR
Status: DISCONTINUED | OUTPATIENT
Start: 2023-05-18 | End: 2023-05-18 | Stop reason: HOSPADM

## 2023-05-18 RX ORDER — ACETAMINOPHEN 500 MG
1000 TABLET ORAL EVERY 6 HOURS PRN
Status: DISCONTINUED | OUTPATIENT
Start: 2023-05-23 | End: 2023-05-21 | Stop reason: HOSPADM

## 2023-05-18 RX ORDER — ENEMA 19; 7 G/133ML; G/133ML
1 ENEMA RECTAL
Status: DISCONTINUED | OUTPATIENT
Start: 2023-05-18 | End: 2023-05-21 | Stop reason: HOSPADM

## 2023-05-18 RX ORDER — DEXAMETHASONE SODIUM PHOSPHATE 4 MG/ML
INJECTION, SOLUTION INTRA-ARTICULAR; INTRALESIONAL; INTRAMUSCULAR; INTRAVENOUS; SOFT TISSUE PRN
Status: DISCONTINUED | OUTPATIENT
Start: 2023-05-18 | End: 2023-05-18 | Stop reason: SURG

## 2023-05-18 RX ORDER — HALOPERIDOL 5 MG/ML
1 INJECTION INTRAMUSCULAR EVERY 6 HOURS PRN
Status: DISCONTINUED | OUTPATIENT
Start: 2023-05-18 | End: 2023-05-21 | Stop reason: HOSPADM

## 2023-05-18 RX ORDER — ACETAMINOPHEN 500 MG
1000 TABLET ORAL EVERY 6 HOURS
Status: DISCONTINUED | OUTPATIENT
Start: 2023-05-18 | End: 2023-05-21 | Stop reason: HOSPADM

## 2023-05-18 RX ORDER — OXYCODONE HCL 5 MG/5 ML
5 SOLUTION, ORAL ORAL
Status: DISCONTINUED | OUTPATIENT
Start: 2023-05-18 | End: 2023-05-18 | Stop reason: HOSPADM

## 2023-05-18 RX ORDER — CLONIDINE HYDROCHLORIDE 0.1 MG/1
0.1 TABLET ORAL EVERY 4 HOURS PRN
Status: DISCONTINUED | OUTPATIENT
Start: 2023-05-18 | End: 2023-05-21 | Stop reason: HOSPADM

## 2023-05-18 RX ORDER — LABETALOL HYDROCHLORIDE 5 MG/ML
10 INJECTION, SOLUTION INTRAVENOUS
Status: DISCONTINUED | OUTPATIENT
Start: 2023-05-18 | End: 2023-05-21 | Stop reason: HOSPADM

## 2023-05-18 RX ORDER — SODIUM CHLORIDE, SODIUM LACTATE, POTASSIUM CHLORIDE, CALCIUM CHLORIDE 600; 310; 30; 20 MG/100ML; MG/100ML; MG/100ML; MG/100ML
INJECTION, SOLUTION INTRAVENOUS CONTINUOUS
Status: DISCONTINUED | OUTPATIENT
Start: 2023-05-18 | End: 2023-05-18 | Stop reason: HOSPADM

## 2023-05-18 RX ORDER — HYDRALAZINE HYDROCHLORIDE 20 MG/ML
10 INJECTION INTRAMUSCULAR; INTRAVENOUS
Status: DISCONTINUED | OUTPATIENT
Start: 2023-05-18 | End: 2023-05-21 | Stop reason: HOSPADM

## 2023-05-18 RX ADMIN — HALOPERIDOL LACTATE 1 MG: 5 INJECTION, SOLUTION INTRAMUSCULAR at 11:55

## 2023-05-18 RX ADMIN — PROPOFOL 200 MG: 10 INJECTION, EMULSION INTRAVENOUS at 07:43

## 2023-05-18 RX ADMIN — GABAPENTIN 600 MG: 300 CAPSULE ORAL at 14:03

## 2023-05-18 RX ADMIN — DEXAMETHASONE SODIUM PHOSPHATE 4 MG: 4 INJECTION, SOLUTION INTRA-ARTICULAR; INTRALESIONAL; INTRAMUSCULAR; INTRAVENOUS; SOFT TISSUE at 16:30

## 2023-05-18 RX ADMIN — ONDANSETRON 4 MG: 2 INJECTION INTRAMUSCULAR; INTRAVENOUS at 14:38

## 2023-05-18 RX ADMIN — ACETAMINOPHEN 1000 MG: 500 TABLET, FILM COATED ORAL at 14:03

## 2023-05-18 RX ADMIN — OXYCODONE HYDROCHLORIDE 5 MG: 5 TABLET ORAL at 20:15

## 2023-05-18 RX ADMIN — PHENYLEPHRINE HYDROCHLORIDE 0.1 MCG/KG/MIN: 10 INJECTION INTRAVENOUS at 08:00

## 2023-05-18 RX ADMIN — CEFAZOLIN 2 G: 2 INJECTION, POWDER, FOR SOLUTION INTRAMUSCULAR; INTRAVENOUS at 15:38

## 2023-05-18 RX ADMIN — CEFAZOLIN 2 G: 1 INJECTION, POWDER, FOR SOLUTION INTRAMUSCULAR; INTRAVENOUS at 07:47

## 2023-05-18 RX ADMIN — HYDROMORPHONE HYDROCHLORIDE 0.2 MG: 2 INJECTION INTRAMUSCULAR; INTRAVENOUS; SUBCUTANEOUS at 10:33

## 2023-05-18 RX ADMIN — SODIUM CHLORIDE, POTASSIUM CHLORIDE, SODIUM LACTATE AND CALCIUM CHLORIDE: 600; 310; 30; 20 INJECTION, SOLUTION INTRAVENOUS at 07:37

## 2023-05-18 RX ADMIN — CEFAZOLIN 2 G: 2 INJECTION, POWDER, FOR SOLUTION INTRAMUSCULAR; INTRAVENOUS at 21:13

## 2023-05-18 RX ADMIN — CARBAMAZEPINE 300 MG: 100 TABLET, EXTENDED RELEASE ORAL at 17:31

## 2023-05-18 RX ADMIN — SODIUM CHLORIDE: 9 INJECTION, SOLUTION INTRAVENOUS at 13:50

## 2023-05-18 RX ADMIN — ROCURONIUM BROMIDE 40 MG: 50 INJECTION, SOLUTION INTRAVENOUS at 07:43

## 2023-05-18 RX ADMIN — SODIUM CHLORIDE, POTASSIUM CHLORIDE, SODIUM LACTATE AND CALCIUM CHLORIDE: 600; 310; 30; 20 INJECTION, SOLUTION INTRAVENOUS at 07:24

## 2023-05-18 RX ADMIN — DEXAMETHASONE SODIUM PHOSPHATE 8 MG: 4 INJECTION INTRA-ARTICULAR; INTRALESIONAL; INTRAMUSCULAR; INTRAVENOUS; SOFT TISSUE at 07:47

## 2023-05-18 RX ADMIN — REMIFENTANIL HYDROCHLORIDE 0.12 MCG/KG/MIN: 1 INJECTION, POWDER, LYOPHILIZED, FOR SOLUTION INTRAVENOUS at 08:00

## 2023-05-18 RX ADMIN — FENTANYL CITRATE 100 MCG: 50 INJECTION, SOLUTION INTRAMUSCULAR; INTRAVENOUS at 07:43

## 2023-05-18 RX ADMIN — LIDOCAINE HYDROCHLORIDE 100 MG: 20 INJECTION, SOLUTION EPIDURAL; INFILTRATION; INTRACAUDAL at 07:43

## 2023-05-18 RX ADMIN — DOCUSATE SODIUM 100 MG: 100 CAPSULE, LIQUID FILLED ORAL at 17:31

## 2023-05-18 RX ADMIN — HALOPERIDOL LACTATE 1 MG: 5 INJECTION, SOLUTION INTRAMUSCULAR at 13:22

## 2023-05-18 RX ADMIN — FENTANYL CITRATE 25 MCG: 50 INJECTION, SOLUTION INTRAMUSCULAR; INTRAVENOUS at 11:48

## 2023-05-18 RX ADMIN — ONDANSETRON 4 MG: 2 INJECTION INTRAMUSCULAR; INTRAVENOUS at 11:15

## 2023-05-18 RX ADMIN — ACETAMINOPHEN 1000 MG: 500 TABLET, FILM COATED ORAL at 17:35

## 2023-05-18 RX ADMIN — ACETAMINOPHEN 1000 MG: 500 TABLET, FILM COATED ORAL at 23:54

## 2023-05-18 RX ADMIN — GABAPENTIN 600 MG: 300 CAPSULE ORAL at 21:13

## 2023-05-18 RX ADMIN — FENTANYL CITRATE 25 MCG: 50 INJECTION, SOLUTION INTRAMUSCULAR; INTRAVENOUS at 12:41

## 2023-05-18 RX ADMIN — HYDROMORPHONE HYDROCHLORIDE 0.5 MG: 1 INJECTION, SOLUTION INTRAMUSCULAR; INTRAVENOUS; SUBCUTANEOUS at 16:47

## 2023-05-18 ASSESSMENT — LIFESTYLE VARIABLES
EVER HAD A DRINK FIRST THING IN THE MORNING TO STEADY YOUR NERVES TO GET RID OF A HANGOVER: NO
ON A TYPICAL DAY WHEN YOU DRINK ALCOHOL HOW MANY DRINKS DO YOU HAVE: 1
TOTAL SCORE: 0
CONSUMPTION TOTAL: NEGATIVE
HOW MANY TIMES IN THE PAST YEAR HAVE YOU HAD 5 OR MORE DRINKS IN A DAY: 0
TOTAL SCORE: 0
ALCOHOL_USE: YES
AVERAGE NUMBER OF DAYS PER WEEK YOU HAVE A DRINK CONTAINING ALCOHOL: 1
TOTAL SCORE: 0
EVER FELT BAD OR GUILTY ABOUT YOUR DRINKING: NO
HAVE YOU EVER FELT YOU SHOULD CUT DOWN ON YOUR DRINKING: NO
HAVE PEOPLE ANNOYED YOU BY CRITICIZING YOUR DRINKING: NO
DOES PATIENT WANT TO STOP DRINKING: NO

## 2023-05-18 ASSESSMENT — COGNITIVE AND FUNCTIONAL STATUS - GENERAL
WALKING IN HOSPITAL ROOM: A LITTLE
SUGGESTED CMS G CODE MODIFIER MOBILITY: CJ
DAILY ACTIVITIY SCORE: 24
CLIMB 3 TO 5 STEPS WITH RAILING: A LITTLE
SUGGESTED CMS G CODE MODIFIER DAILY ACTIVITY: CH
STANDING UP FROM CHAIR USING ARMS: A LITTLE
MOBILITY SCORE: 21

## 2023-05-18 ASSESSMENT — PAIN DESCRIPTION - PAIN TYPE
TYPE: SURGICAL PAIN
TYPE: ACUTE PAIN
TYPE: SURGICAL PAIN
TYPE: ACUTE PAIN
TYPE: SURGICAL PAIN
TYPE: ACUTE PAIN
TYPE: ACUTE PAIN
TYPE: SURGICAL PAIN
TYPE: SURGICAL PAIN

## 2023-05-18 ASSESSMENT — PATIENT HEALTH QUESTIONNAIRE - PHQ9
1. LITTLE INTEREST OR PLEASURE IN DOING THINGS: NOT AT ALL
SUM OF ALL RESPONSES TO PHQ9 QUESTIONS 1 AND 2: 0
2. FEELING DOWN, DEPRESSED, IRRITABLE, OR HOPELESS: NOT AT ALL

## 2023-05-18 ASSESSMENT — FIBROSIS 4 INDEX: FIB4 SCORE: 0.46

## 2023-05-18 NOTE — OP REPORT
Neurosurgery Operative Note    Patient Name: Ifrah Brunson MRN: [id number] YOB: 1975  Date of Surgery: 5/18/2023     SURGEON: Shruthi Echeverria M.D.    ASSISTANT: JIMMIE Middleton    PRE-OPERATIVE DIAGNOSIS:   right-sided trigeminal neuralgia, medically refractory           POST-OPERATIVE DIAGNOSIS:              PROCEDURE:   right retrosigmoid craniotomy for microvascular decompression of CN V  Partial internal petrosectomy  Use of intraoperative neuromonitoring CN V, VII, BAERs and XI  Use of intraoperative microscope           ANESTHESIA: GETA           ESTIMATED BLOOD LOSS: 100cc           DRAINS: none    FINDINGS: compressive brainstem vein on CN V           SPECIMENS: none          IMPLANTS: Nellis titanium mini plates and screws           COMPLICATIONS: None apparent.    Operative Indications: The patient is a 48 year old woman presenting with a history of right V3 Type I trigeminal neuralgia.  Her pain was somewhat controlled with medications, but she has been experiencing significant side effects that affect her work and quality of life. A retrosigmoid craniotomy for microvascular decompression was recommended. The indications, benefits, alternatives and risks to the procedure were discussed with the patient, which included but were not limited to bleeding, infection, stroke, injury to nearby nerves and vessels, cerebrospinal fluid leak, new weakness or sensory changes, anesthesia dolorosa, pain recurrence, worsened pain, deafness, facial weakness, difficulty swallowing, hoarseness, worsened imbalance or discoordination, coma and rarely, even death.  The patient was in agreement and wished to proceed.     Operative details:  The patient was identified in the pre-operative holding area by perioperative staff by two forms of identification. The patient was brought to the operating room, induced under general anesthesia and intubated without complication. Antibiotics and Decadron  were administered. A Nevarez catheter, IV access and arterial line were placed by the Anesthesia team and nursing staff. Subcutaneous needles for intraoperative neuromonitoring were placed by the surgical monitoring team. The patient was positioned in the left lateral decubitus to position on a beanbag on the operating room table with the right arm draped over a sling. All pressure points were padded. SCDs were on and functioning. The head was secured in a Lyles headholder to 60lb of pressure, turned to the left with the right side up, and secured to the bed attachment with the asterion at the highest point in the surgical field. A C-shaped incision was planned approximately 6 cm posterior to the pinna.  The transverse sigmoid junction was approximated using the standard anatomical scalp landmarks.  The right scalp was minimally clipped of hair, prepped with isopropyl alcohol 4x4 sponges and Chloroprep. The patient was draped in the usual sterile fashion. A time-out indicated the correct patient, procedure and side.  Baseline SSEP and MEP and BAERS were recorded.     Marcaine 0.5% with epinephrine 1:200,000 was injected subcutaneously along the incision. Incision was made with a 10-blade. Monopolar cautery was used to dissect through the subcutaneous tissue in the suprafascial plane to raise a cutaneous flap laterally over the ear.  The ear was padded with Ray-Becky's, and a wet Ray-Becky was placed over the scalp flap and retracted with fishhooks.  The suboccipital musculature was divided superior along the nuchal line, posteriorly along the edge of the incision, and laterally along the mastoid groove, then retracted inferiorly and dissected in subperiosteal plane.  This was secured with fishhooks.  Scalp landmarks were again used to identify the transverse sigmoid junction.  A bur hole was created just inferior and posterior to this junction using a high-speed drill with an wedgies drill bit.  The epidural plane was  then dissected with a Penfield 3 instrument, the bur hole was expanded until the edges of the transverse and sigmoid sinuses were reached.  The drill was used to trough along the sigmoid sinus. A B1 with footplate was used to turn a craniotomy. The cranial flap was removed and soaked in saline on the back table.  The microscope was then draped and brought into the field.  Under bright magnification, the inferior aspect of the dura was sharply incised with a 15 blade.  Half by 3 deloris was used to enter the subdural space, and follow along the petrous dura to the lateral cerebellar medullary cistern.  Using microsurgical technique, the cistern was sharply opened with the apex arachnoid knife.  CSF was allowed to egress to allow relaxation of the cerebellum.  The dural flap was then opened with microscissors, retracted medially and tacked with 4-0 Nurolon suture.  The lateral aspect of the cerebellum was covered with Telfa patties.  The lateral cerebellar medullary cistern was then reentered, and cranial nerve XI was identified.  This was stimulated at 0.1 mA.  Attention was then turned superiorly to the cranial nerve VII 8 complex.  Microsurgical technique was performed for arachnoidal dissection and liberation of the complex.  Cranial nerve VII was stimulated at 0.1 mA.  Attention was then turned more superiorly to the tentorial angle.  A diminutive superior petrosal vein was identified. Cranial nerve V was identified at its root entry zone.  The motor root of cranial nerve V was stimulated successfully at 0.1 mA.  The superior cerebellar artery was found running above and ventral to the DREZ, which was dissected and moved away. A large brainstem vein was running inferiorly to the nerve, but the distal end was obstructed by the suprameatal tubercle. An ultrasonic aspirator with bone cutting bit was used to perform a partial internal petrosectomy to allow improved visualization of Meckel's cave, the distal CNV and  distal vein. The vein was followed and found to be entering Meckel's cave, as well compressing the nerve. The vein was dissected from the nerve and was shrunk with bipolar cautery under low power. Stanislav felt was used to pad the vein away, as well as the SCA superiorly. There was still adequate venous flow through the vein.  The Stanislav was secured with Tisseel fibrin glue. Facial nerve stimulated at 0.2mA and BAERs were stable.     The field was copiously irrigated with Ancef infused saline.  Hemostasis was achieved with bipolar cautery and thrombin foam.  The dura was reapproximated with a running 5-0 Prolene suture, were able.  Tiseal was applied to the epidural space, followed by a Gelfoam sponge. The bone flap was secured to the skull with titanium mini-plates and screws.      The incision was closed in layers, interrupted 0-Vicryl for muscle and fascia, interrupted inverted 2-0 Vicryl for galea and staples for the skin. The skin was cleansed and dressed with bacitracin.     The Lyles headholder was removed and the patient was extubated, and taken to the recovery room in a stable condition.     All counts were correct x2.      MATEO York was present for all parts of the surgery, including the incision, exposure, critical portions of the procedure and closure.    A first assistant was required for assistance with exposure, tissue retraction, suctioning and irrigating the field during microdissection and closure.

## 2023-05-18 NOTE — ANESTHESIA TIME REPORT
Anesthesia Start and Stop Event Times     Date Time Event    5/18/2023 0718 Ready for Procedure     0737 Anesthesia Start     1110 Anesthesia Stop        Responsible Staff  05/18/23    Name Role Begin End    Umesh Argueta M.D. Anesth 0737 1110        Overtime Reason:  no overtime (within assigned shift)    Comments:

## 2023-05-18 NOTE — ANESTHESIA PROCEDURE NOTES
Arterial Line    Performed by: Umesh Argueta M.D.  Authorized by: Umesh Argueta M.D.    Start Time:  5/18/2023 7:45 AM  End Time:  5/18/2023 7:50 AM  Localization: surface landmarks    Patient Location:  OR  Indication: continuous blood pressure monitoring        Catheter Size:  20 G  Seldinger Technique?: Yes    Laterality:  Left  Site:  Radial artery  Line Secured:  Antimicrobial disc, tape and transparent dressing  Events: patient tolerated procedure well with no complications

## 2023-05-18 NOTE — ANESTHESIA POSTPROCEDURE EVALUATION
Patient: Ifrah Brunson    Procedure Summary     Date: 05/18/23 Room / Location: [facility] 05 / SURGERY [facility]    Anesthesia Start: 0737 Anesthesia Stop: 1110    Procedure: RIGHT CRANIOTOMY FOR MICROVASCULAR DECOMPRESSION (Right: Head) Diagnosis: (TRIGEMINAL NEURALGIA)    Surgeons: Shruthi Echeverria M.D. Responsible Provider: Umesh Argueta M.D.    Anesthesia Type: general ASA Status: 3          Final Anesthesia Type: general  Last vitals  BP   Blood Pressure: (!) 145/80    Temp   36.1 °C (97 °F)    Pulse   78   Resp   20    SpO2   97 %      Anesthesia Post Evaluation    Patient location during evaluation: PACU  Patient participation: complete - patient participated  Level of consciousness: awake and alert    Airway patency: patent  Anesthetic complications: no  Cardiovascular status: hemodynamically stable  Respiratory status: acceptable  Hydration status: euvolemic    PONV: none          No notable events documented.

## 2023-05-18 NOTE — ANESTHESIA PROCEDURE NOTES
Airway    Date/Time: 5/18/2023 7:45 AM    Performed by: Umesh Argueta M.D.  Authorized by: Umesh Argueta M.D.    Location:  OR  Urgency:  Elective  Indications for Airway Management:  Anesthesia      Spontaneous Ventilation: absent    Sedation Level:  Deep  Preoxygenated: Yes    Patient Position:  Sniffing  Mask Difficulty Assessment:  2 - vent by mask + OA or adjuvant +/- NMBA  Final Airway Type:  Endotracheal airway  Final Endotracheal Airway:  ETT  Cuffed: Yes    Technique Used for Successful ETT Placement:  Direct laryngoscopy    Insertion Site:  Oral  Blade Type:  Moises  Laryngoscope Blade/Videolaryngoscope Blade Size:  3  ETT Size (mm):  6.5  Measured from:  Teeth  ETT to Teeth (cm):  20  Placement Verified by: auscultation and capnometry    Cormack-Lehane Classification:  Grade IIa - partial view of glottis  Number of Attempts at Approach:  1

## 2023-05-18 NOTE — OR NURSING
1108: Pt arrived via bed with anesthesia and RN. VSS. Incision site intact with old drainage. Orders reviewed and initiated.  1120: Dr. Corcoran at bedside.   1125: Dr. Echeverria at bedside.  1217: Family updated  1320: Report called to SILVER Madden. All questions answered. VSS. No patient distress. Alert and oriented x4. Pain and nausea medicated per MAR. Pt transported to room on 6L oxymask and monitor with ACLS RN and 1 bag of belongings. 1 episode of nausea and emesis in transport. See MAR for details.

## 2023-05-18 NOTE — CONSULTS
CRITICAL CARE MEDICINE ATTENDING CONSULTATION    Date of admission  5/18/2023    Chief Complaint  48 y.o. female admitted 5/18/2023 for elective neurosurgery for medically refractory right-sided trigeminal neuralgia.    History of Present Illness      I was kindly asked by Dr. Shruthi Echeverria to see and evaluate this 48-year-old lady for critical care management following neurosurgery.  This lady has a history of medically refractory right trigeminal neuralgia, GERD, migraines, anxiety/depression and hypothyroidism.  Today  took her to the operating theater and performed right retrosigmoid craniotomy for microvascular decompression of cranial nerve V and partial internal petrosectomy.  She requires admission to the ICU for very close neurological observation and strict blood pressure control.      Review of Systems  Review of Systems   Unable to perform ROS: Acuity of condition       Vital Signs for the last 24 hours  Temp:  [36.1 °C (97 °F)] 36.1 °C (97 °F)  Pulse:  [] 94  Resp:  [20-21] 20  BP: (141-146)/(75-85) 146/85  SpO2:  [95 %-97 %] 95 %    Hemodynamic parameters for the last 24 hours       Vent Settings for the last 24 hours       Physical Exam  Physical Exam  Constitutional:       Appearance: She is obese. She is not diaphoretic.   HENT:      Nose: Nose normal.      Mouth/Throat:      Pharynx: Oropharynx is clear.   Eyes:      Pupils: Pupils are equal, round, and reactive to light.   Cardiovascular:      Comments: Sinus rhythm  Pulmonary:      Breath sounds: No wheezing or rales.   Abdominal:      General: There is no distension.      Tenderness: There is no abdominal tenderness.   Musculoskeletal:      Cervical back: Normal range of motion.      Right lower leg: No edema.      Left lower leg: No edema.   Skin:     General: Skin is warm.      Capillary Refill: Capillary refill takes less than 2 seconds.   Neurological:      Comments: She is a wee bit groggy from anesthesia.  She moves all  extremities.  She answers questions appropriately.         Medications  Current Facility-Administered Medications   Medication Dose Route Frequency Provider Last Rate Last Admin    lactated ringers infusion   Intravenous Continuous Umseh Argueta M.D.        fentaNYL (SUBLIMAZE) injection 25 mcg  25 mcg Intravenous Q2 MIN PRN Umesh Argueta M.D.        Or    fentaNYL (SUBLIMAZE) injection 50 mcg  50 mcg Intravenous Q2 MIN PRN Umesh Argueta M.D.        HYDROmorphone (Dilaudid) injection 0.1 mg  0.1 mg Intravenous Q5 MIN PRN Umesh Argueta M.D.        Or    HYDROmorphone (Dilaudid) injection 0.2 mg  0.2 mg Intravenous Q5 MIN PRN Umesh Argueta M.D.        Or    HYDROmorphone (Dilaudid) injection 0.4 mg  0.4 mg Intravenous Q5 MIN PRN Umesh Argueta M.D.        oxyCODONE (ROXICODONE) oral solution 5 mg  5 mg Oral Once PRN Umesh Argueta M.D.        Or    oxyCODONE (ROXICODONE) oral solution 10 mg  10 mg Oral Once PRN Umesh Argueta M.D.        meperidine (DEMEROL) injection 12.5 mg  12.5 mg Intravenous Q5 MIN PRN Umesh Argueta M.D.        haloperidol lactate (HALDOL) injection 1 mg  1 mg Intravenous Q15 MIN PRN Umesh Argueta M.D.        diphenhydrAMINE (BENADRYL) injection 12.5 mg  12.5 mg Intravenous Q15 MIN PRN Umesh Argueta M.D.        buPROPion SR (WELLBUTRIN-SR) tablet 150 mg  150 mg Oral DAILY Summer LADARIUS Stapleton        carBAMazepine SR (TEGRETOL XR) tablet 300 mg  300 mg Oral BID Summer LADARIUS Stapleton        gabapentin (NEURONTIN) capsule 600 mg  600 mg Oral TID Summer LADARIUS Stapleton        levothyroxine (SYNTHROID) tablet 175 mcg  175 mcg Oral See Admin Instructions Summer LADARIUS Stapleton        liothyronine (CYTOMEL) tablet 5 mcg  5 mcg Oral QAM Summer LADARIUS Stapleton           Fluids    Intake/Output Summary (Last 24 hours) at 5/18/2023 1142  Last data filed at 5/18/2023 1017  Gross per 24 hour   Intake 500 ml   Output 500 ml   Net 0 ml       Laboratory      No results for input(s): SODIUM, POTASSIUM, CHLORIDE, CO2, BUN, CREATININE, MAGNESIUM, PHOSPHORUS, CALCIUM in  the last 72 hours.  No results for input(s): ALTSGPT, ASTSGOT, ALKPHOSPHAT, TBILIRUBIN, DBILIRUBIN, GAMMAGT, AMYLASE, LIPASE, ALB, PREALBUMIN, GLUCOSE in the last 72 hours.      No results for input(s): RBC, HEMOGLOBIN, HEMATOCRIT, PLATELETCT, PROTHROMBTM, APTT, INR, IRON, FERRITIN, TOTIRONBC in the last 72 hours.    Imaging  None    Assessment/Plan      S/P right retrosigmoid craniotomy with microvascular decompression of CN V and partial internal petrosectomy for medically refractory right-sided trigeminal neuralgia   Dr. Echeverria on 5/18   Neuro checks every hour   Strict BP control with goal SBP less than 140    GERD   PPI    History of anxiety/depression    History of migraines    Hypothyroidism   Levothyroxine      VTE:  Contraindicated  Ulcer: PPI  Lines: Central Line  Ongoing indication addressed and Nevarez Catheter  Ongoing indication addressed    I have performed a physical exam and reviewed and updated ROS and Plan today (5/18/2023). In review of yesterday's note (5/17/2023), there are no changes except as documented above.     I have assessed and reassessed her blood pressure, hemodynamics, cardiovascular status and neurologic status.  This lady is critically ill following neurosurgery.  She requires very close neurological monitoring and strict blood pressure control.  She is at increased risk for worsening CNS system dysfunction.    Discussed patient condition and risk of morbidity and/or mortality with RN and neurosurgery    The patient remains critically ill.  Critical care time = 35 minutes in directly providing and coordinating critical care and extensive data review.  No time overlap and excludes procedures.    A Critical Care Medicine progress note may have been authored by a resident physician or advanced practitioner of nursing under my direct supervision on this date of service.  As the supervising and attending physician, I have either attested to or cosigned that document.  IN THE EVENT THAT  DISCREPANCIES EXIST BETWEEN THIS DOCUMENT AND ANY DOCUMENT THAT I HAVE ATTESTED TO OR COSIGNED ON THIS DATE OF SERVICE, THEN THIS DOCUMENT REMAINS THE FINAL AUTHORITY AS TO MY ASSESSMENT AND PLAN REGARDING THE CARE OF THIS PATIENT.    Umesh Latham MD  Pulmonary and Critical Care Medicine

## 2023-05-18 NOTE — ANESTHESIA PREPROCEDURE EVALUATION
Case: [id number] Date/Time: 05/18/23 0715    Procedure: RIGHT CRANIOTOMY FOR MICROVASCULAR DECOMPRESSION    Pre-op diagnosis: TRIGEMINAL NEURALGIA    Location: [facility] OR 05 / SURGERY [facility]    Surgeons: Shruthi Echeverria M.D.        47 yo F w/ BMI 40, h/o PONV and thyroid Ca s/p resection    Relevant Problems   NEURO   (positive) Migraines      CARDIAC   (positive) Migraines      GI   (positive) Gastroesophageal reflux disease without esophagitis      ENDO   (positive) Post-surgical hypothyroidism       Physical Exam    Airway   Mallampati: IV  TM distance: >3 FB  Neck ROM: full       Cardiovascular - normal exam  Rhythm: regular  Rate: normal  (-) murmur     Dental - normal exam           Pulmonary - normal exam  Breath sounds clear to auscultation     Abdominal   (+) obese     Neurological - normal exam                 Anesthesia Plan    ASA 3   ASA physical status 3 criteria: morbid obesity - BMI greater than or equal to 40    Plan - general       Airway plan will be ETT          Induction: intravenous    Postoperative Plan: Postoperative administration of opioids is intended.    Pertinent diagnostic labs and testing reviewed    Informed Consent:    Anesthetic plan and risks discussed with patient.    Use of blood products discussed with: patient whom consented to blood products.

## 2023-05-19 PROBLEM — R53.1 WEAKNESS: Status: ACTIVE | Noted: 2023-05-19

## 2023-05-19 PROBLEM — D72.829 LEUKOCYTOSIS: Status: ACTIVE | Noted: 2023-05-19

## 2023-05-19 LAB
ANION GAP SERPL CALC-SCNC: 12 MMOL/L (ref 7–16)
BASOPHILS # BLD AUTO: 0.2 % (ref 0–1.8)
BASOPHILS # BLD: 0.03 K/UL (ref 0–0.12)
BUN SERPL-MCNC: 13 MG/DL (ref 8–22)
CALCIUM SERPL-MCNC: 8 MG/DL (ref 8.5–10.5)
CHLORIDE SERPL-SCNC: 107 MMOL/L (ref 96–112)
CO2 SERPL-SCNC: 19 MMOL/L (ref 20–33)
CREAT SERPL-MCNC: 0.5 MG/DL (ref 0.5–1.4)
EOSINOPHIL # BLD AUTO: 0.04 K/UL (ref 0–0.51)
EOSINOPHIL NFR BLD: 0.3 % (ref 0–6.9)
ERYTHROCYTE [DISTWIDTH] IN BLOOD BY AUTOMATED COUNT: 43 FL (ref 35.9–50)
GFR SERPLBLD CREATININE-BSD FMLA CKD-EPI: 116 ML/MIN/1.73 M 2
GLUCOSE BLD STRIP.AUTO-MCNC: 123 MG/DL (ref 65–99)
GLUCOSE SERPL-MCNC: 108 MG/DL (ref 65–99)
HCT VFR BLD AUTO: 41.3 % (ref 37–47)
HGB BLD-MCNC: 13.5 G/DL (ref 12–16)
IMM GRANULOCYTES # BLD AUTO: 0.1 K/UL (ref 0–0.11)
IMM GRANULOCYTES NFR BLD AUTO: 0.8 % (ref 0–0.9)
LYMPHOCYTES # BLD AUTO: 1.78 K/UL (ref 1–4.8)
LYMPHOCYTES NFR BLD: 13.6 % (ref 22–41)
MAGNESIUM SERPL-MCNC: 1.9 MG/DL (ref 1.5–2.5)
MCH RBC QN AUTO: 29 PG (ref 27–33)
MCHC RBC AUTO-ENTMCNC: 32.7 G/DL (ref 33.6–35)
MCV RBC AUTO: 88.8 FL (ref 81.4–97.8)
MONOCYTES # BLD AUTO: 1.28 K/UL (ref 0–0.85)
MONOCYTES NFR BLD AUTO: 9.8 % (ref 0–13.4)
NEUTROPHILS # BLD AUTO: 9.89 K/UL (ref 2–7.15)
NEUTROPHILS NFR BLD: 75.3 % (ref 44–72)
NRBC # BLD AUTO: 0 K/UL
NRBC BLD-RTO: 0 /100 WBC
PHOSPHATE SERPL-MCNC: 3.4 MG/DL (ref 2.5–4.5)
PLATELET # BLD AUTO: 207 K/UL (ref 164–446)
PMV BLD AUTO: 9.3 FL (ref 9–12.9)
POTASSIUM SERPL-SCNC: 4.1 MMOL/L (ref 3.6–5.5)
RBC # BLD AUTO: 4.65 M/UL (ref 4.2–5.4)
SODIUM SERPL-SCNC: 138 MMOL/L (ref 135–145)
WBC # BLD AUTO: 13.1 K/UL (ref 4.8–10.8)

## 2023-05-19 PROCEDURE — 700102 HCHG RX REV CODE 250 W/ 637 OVERRIDE(OP)

## 2023-05-19 PROCEDURE — 97166 OT EVAL MOD COMPLEX 45 MIN: CPT

## 2023-05-19 PROCEDURE — 97163 PT EVAL HIGH COMPLEX 45 MIN: CPT

## 2023-05-19 PROCEDURE — 700105 HCHG RX REV CODE 258

## 2023-05-19 PROCEDURE — 700111 HCHG RX REV CODE 636 W/ 250 OVERRIDE (IP): Performed by: INTERNAL MEDICINE

## 2023-05-19 PROCEDURE — 99233 SBSQ HOSP IP/OBS HIGH 50: CPT | Performed by: INTERNAL MEDICINE

## 2023-05-19 PROCEDURE — 83735 ASSAY OF MAGNESIUM: CPT

## 2023-05-19 PROCEDURE — 700111 HCHG RX REV CODE 636 W/ 250 OVERRIDE (IP)

## 2023-05-19 PROCEDURE — 82962 GLUCOSE BLOOD TEST: CPT

## 2023-05-19 PROCEDURE — 99222 1ST HOSP IP/OBS MODERATE 55: CPT | Mod: FS | Performed by: NURSE PRACTITIONER

## 2023-05-19 PROCEDURE — 80048 BASIC METABOLIC PNL TOTAL CA: CPT

## 2023-05-19 PROCEDURE — 85025 COMPLETE CBC W/AUTO DIFF WBC: CPT

## 2023-05-19 PROCEDURE — RXMED WILLOW AMBULATORY MEDICATION CHARGE

## 2023-05-19 PROCEDURE — 770006 HCHG ROOM/CARE - MED/SURG/GYN SEMI*

## 2023-05-19 PROCEDURE — 84100 ASSAY OF PHOSPHORUS: CPT

## 2023-05-19 PROCEDURE — A9270 NON-COVERED ITEM OR SERVICE: HCPCS | Performed by: INTERNAL MEDICINE

## 2023-05-19 PROCEDURE — 700102 HCHG RX REV CODE 250 W/ 637 OVERRIDE(OP): Performed by: INTERNAL MEDICINE

## 2023-05-19 PROCEDURE — A9270 NON-COVERED ITEM OR SERVICE: HCPCS

## 2023-05-19 RX ORDER — MAGNESIUM SULFATE HEPTAHYDRATE 40 MG/ML
2 INJECTION, SOLUTION INTRAVENOUS ONCE
Status: COMPLETED | OUTPATIENT
Start: 2023-05-19 | End: 2023-05-19

## 2023-05-19 RX ADMIN — LIOTHYRONINE SODIUM 5 MCG: 5 TABLET ORAL at 05:41

## 2023-05-19 RX ADMIN — ONDANSETRON 4 MG: 2 INJECTION INTRAMUSCULAR; INTRAVENOUS at 07:10

## 2023-05-19 RX ADMIN — CARBAMAZEPINE 300 MG: 100 TABLET, EXTENDED RELEASE ORAL at 05:41

## 2023-05-19 RX ADMIN — OMEPRAZOLE 20 MG: 20 CAPSULE, DELAYED RELEASE ORAL at 05:42

## 2023-05-19 RX ADMIN — SODIUM CHLORIDE: 9 INJECTION, SOLUTION INTRAVENOUS at 00:55

## 2023-05-19 RX ADMIN — SENNOSIDES AND DOCUSATE SODIUM 1 TABLET: 50; 8.6 TABLET ORAL at 21:16

## 2023-05-19 RX ADMIN — OXYCODONE HYDROCHLORIDE 5 MG: 5 TABLET ORAL at 13:03

## 2023-05-19 RX ADMIN — BUPROPION HYDROCHLORIDE 150 MG: 150 TABLET, EXTENDED RELEASE ORAL at 05:42

## 2023-05-19 RX ADMIN — GABAPENTIN 600 MG: 300 CAPSULE ORAL at 15:41

## 2023-05-19 RX ADMIN — DOCUSATE SODIUM 100 MG: 100 CAPSULE, LIQUID FILLED ORAL at 05:41

## 2023-05-19 RX ADMIN — DOCUSATE SODIUM 100 MG: 100 CAPSULE, LIQUID FILLED ORAL at 17:15

## 2023-05-19 RX ADMIN — ACETAMINOPHEN 1000 MG: 500 TABLET, FILM COATED ORAL at 17:14

## 2023-05-19 RX ADMIN — ACETAMINOPHEN 1000 MG: 500 TABLET, FILM COATED ORAL at 12:05

## 2023-05-19 RX ADMIN — CEFAZOLIN 2 G: 2 INJECTION, POWDER, FOR SOLUTION INTRAMUSCULAR; INTRAVENOUS at 05:42

## 2023-05-19 RX ADMIN — OXYCODONE HYDROCHLORIDE 5 MG: 5 TABLET ORAL at 04:09

## 2023-05-19 RX ADMIN — GABAPENTIN 600 MG: 300 CAPSULE ORAL at 21:15

## 2023-05-19 RX ADMIN — CARBAMAZEPINE 300 MG: 100 TABLET, EXTENDED RELEASE ORAL at 17:15

## 2023-05-19 RX ADMIN — LEVOTHYROXINE SODIUM 175 MCG: 0.17 TABLET ORAL at 05:41

## 2023-05-19 RX ADMIN — GABAPENTIN 600 MG: 300 CAPSULE ORAL at 10:23

## 2023-05-19 RX ADMIN — OXYCODONE HYDROCHLORIDE 5 MG: 5 TABLET ORAL at 21:30

## 2023-05-19 RX ADMIN — ACETAMINOPHEN 1000 MG: 500 TABLET, FILM COATED ORAL at 05:40

## 2023-05-19 RX ADMIN — MAGNESIUM SULFATE HEPTAHYDRATE 2 G: 2 INJECTION, SOLUTION INTRAVENOUS at 10:22

## 2023-05-19 ASSESSMENT — ENCOUNTER SYMPTOMS
HEADACHES: 0
ABDOMINAL PAIN: 0
DOUBLE VISION: 0
MYALGIAS: 0
COUGH: 0
FEVER: 0
SHORTNESS OF BREATH: 0
PALPITATIONS: 0
WHEEZING: 0
PHOTOPHOBIA: 0
CHILLS: 0
BLURRED VISION: 0
SENSORY CHANGE: 1
WEAKNESS: 0
DIZZINESS: 0
NAUSEA: 1
VOMITING: 0

## 2023-05-19 ASSESSMENT — COGNITIVE AND FUNCTIONAL STATUS - GENERAL
CLIMB 3 TO 5 STEPS WITH RAILING: A LITTLE
HELP NEEDED FOR BATHING: A LITTLE
SUGGESTED CMS G CODE MODIFIER DAILY ACTIVITY: CJ
TOILETING: A LITTLE
MOBILITY SCORE: 21
SUGGESTED CMS G CODE MODIFIER MOBILITY: CJ
DAILY ACTIVITIY SCORE: 21
WALKING IN HOSPITAL ROOM: A LITTLE
DRESSING REGULAR LOWER BODY CLOTHING: A LITTLE
STANDING UP FROM CHAIR USING ARMS: A LITTLE

## 2023-05-19 ASSESSMENT — PAIN DESCRIPTION - PAIN TYPE
TYPE: ACUTE PAIN

## 2023-05-19 ASSESSMENT — GAIT ASSESSMENTS: GAIT LEVEL OF ASSIST: UNABLE TO PARTICIPATE

## 2023-05-19 ASSESSMENT — ACTIVITIES OF DAILY LIVING (ADL): TOILETING: INDEPENDENT

## 2023-05-19 NOTE — DISCHARGE PLANNING
Care Transition Team Assessment  Mrs. Brunson lives in [place] in a one story home with her  Robinson who is her person of contact.  Prior to admission Mrs. Brunson did not use any DME/HH/home O2.  She denies abuse/etho/tobacco/vaping and does not have advance directives.  Her PCP is Selena Benitez.  Payor is [employer]    Information Source  Orientation Level: Oriented X4  Information Given By: Patient  Informant's Name: Ifrah  Who is responsible for making decisions for patient? : Patient    Readmission Evaluation  Is this a readmission?: No    Elopement Risk  Legal Hold: No  Ambulatory or Self Mobile in Wheelchair: No-Not an Elopement Risk  Elopement Risk: Not at Risk for Elopement    Interdisciplinary Discharge Planning  Does Admitting Nurse Feel This Could be a Complex Discharge?: No  Primary Care Physician: Selena SAMUEL  Lives with - Patient's Self Care Capacity: Spouse  Patient or legal guardian wants to designate a caregiver: No  Support Systems: Family Member(s), Spouse / Significant Other  Housing / Facility: 1 Story House  Able to Return to Previous ADL's: Yes  Mobility Issues: No  Prior Services: None  Assistance Needed: No  Durable Medical Equipment: Not Applicable    Discharge Preparedness  What is your plan after discharge?: Home with help  What are your discharge supports?: Spouse  Prior Functional Level: Ambulatory, Drives Self, Independent with Activities of Daily Living  Difficulity with ADLs: None    Functional Assesment  Prior Functional Level: Ambulatory, Drives Self, Independent with Activities of Daily Living    Finances  Financial Barriers to Discharge: No    Vision / Hearing Impairment  Vision Impairment : Yes  Right Eye Vision: Wears Glasses  Left Eye Vision: Wears Glasses  Hearing Impairment : No         Advance Directive  Advance Directive?: None    Domestic Abuse  Have you ever been the victim of abuse or violence?: No  Physical Abuse or Sexual Abuse: No  Verbal Abuse or  Emotional Abuse: No  Possible Abuse/Neglect Reported to:: Not Applicable    Psychological Assessment  History of Substance Abuse: None    Discharge Risks or Barriers  Discharge risks or barriers?: No    Anticipated Discharge Information  Discharge Disposition: Discharged to home/self care (01)

## 2023-05-19 NOTE — CONSULTS
Hospital Medicine Consultation    Date of Service  5/19/2023    Referring Physician  RAJNI Mejia.*    Consulting Physician  BOBBI Mejia.    Reason for Consultation  RICU downgrade to Hospitalist service    History of Presenting Illness  48 y.o. female who presented 5/18/2023 for elective right retrosigmoid craniotomy for microvascular decompression of cranial nerve V and partial internal petrosectomy.  She was initially taken postoperatively to the ICU for very close neurological assessments and strict blood pressure control.  However today she has been downgraded to hospital care and is currently awaiting bed on neuro unit.     Patient seen and examined, vital stable, labs reviewed  Continue current management  Patient and family updated at bedside  Ambulation only with PT as she had vasovagal on her first attempt to stand, she did not pass out she did not hit the floor and her vitals were stable during the event.     Review of Systems  ROS    Past Medical History   has a past medical history of Cancer (HCC) (2012), Hiatus hernia syndrome (2022), High cholesterol (2023), Indigestion (2022), PONV (postoperative nausea and vomiting) (2010 and 2012), Post-surgical hypothyroidism (01/01/2012), and Psychiatric problem (1990).    Surgical History   has a past surgical history that includes cholecystectomy (01/01/2009); gyn surgery (2016); other (2012); and craniotomy (Right, 5/18/2023).    Family History  family history includes Arterial Aneurysm in her father; Breast Cancer in her maternal grandmother; Cancer in her maternal grandmother, maternal uncle, and paternal uncle; Colorectal Cancer in her paternal uncle; Diabetes in her father and paternal grandmother; Heart Disease in her father, maternal grandfather, and paternal grandfather; Hyperlipidemia in her father, maternal grandfather, mother, and paternal grandfather; Hypertension in her maternal grandfather and paternal grandfather;  Psychiatric Illness in her father.    Social History   reports that she quit smoking about 14 years ago. Her smoking use included cigarettes. She has never used smokeless tobacco. She reports current alcohol use of about 1.2 oz of alcohol per week. She reports that she does not use drugs.    Medications  Current Facility-Administered Medications   Medication Dose Route Frequency Provider Last Rate Last Admin    buPROPion SR (WELLBUTRIN-SR) tablet 150 mg  150 mg Oral DAILY Summer LADARIUS Stapleton   150 mg at 05/19/23 0542    carBAMazepine SR (TEGRETOL XR) tablet 300 mg  300 mg Oral BID Summer LADARIUS Stapleton   300 mg at 05/19/23 0541    gabapentin (NEURONTIN) capsule 600 mg  600 mg Oral TID Summer LADARIUS Stapleton   600 mg at 05/19/23 1023    levothyroxine (SYNTHROID) tablet 175 mcg  175 mcg Oral Once per day on Mon Tue Wed Thu Fri Sat Summer LADARIUS Stapleton   175 mcg at 05/19/23 0541    liothyronine (CYTOMEL) tablet 5 mcg  5 mcg Oral QAM Summer LADARIUS Stapleton   5 mcg at 05/19/23 0541    Pharmacy Consult Request ...Pain Management Review 1 Each  1 Each Other PHARMACY TO DOSE Rica Stapleton MD ALERT...DO NOT ADMINISTER NSAIDS or ASPIRIN unless ORDERED By Neurosurgery 1 Each  1 Each Other PRN Rica Stapleton        ondansetron (ZOFRAN) syringe/vial injection 4 mg  4 mg Intravenous Q4HRS PRN Rica Stapleton   4 mg at 05/19/23 0710    diphenhydrAMINE (BENADRYL) injection 25 mg  25 mg Intravenous Q6HRS PRN Rica Stapleton        labetalol (NORMODYNE/TRANDATE) injection 10 mg  10 mg Intravenous Q HOUR PRN Rica Stapleton        hydrALAZINE (APRESOLINE) injection 10 mg  10 mg Intravenous Q HOUR PRN Summer LADARIUS Stapleton        cloNIDine (CATAPRES) tablet 0.1 mg  0.1 mg Oral Q4HRS PRN Summer LADARIUS Stapleton        docusate sodium (COLACE) capsule 100 mg  100 mg Oral BID Summer LADARIUS Stapleton   100 mg at 05/19/23 0541    senna-docusate (PERICOLACE or SENOKOT S) 8.6-50 MG per tablet 1 Tablet  1 Tablet Oral Nightly Rica Stapleton        senna-docusate (PERICOLACE or SENOKOT S) 8.6-50 MG per tablet 1  Tablet  1 Tablet Oral Q24HRS PRN Summer E Pieter        polyethylene glycol/lytes (MIRALAX) PACKET 1 Packet  1 Packet Oral BID PRN Summer E Pieter        magnesium hydroxide (MILK OF MAGNESIA) suspension 30 mL  30 mL Oral QDAY PRN Summer E Cruz        bisacodyl (DULCOLAX) suppository 10 mg  10 mg Rectal Q24HRS PRN Summer E Pieter        sodium phosphate (Fleet) enema 133 mL  1 Each Rectal Once PRN Summer E Pieter        acetaminophen (TYLENOL) tablet 1,000 mg  1,000 mg Oral Q6HRS Summer E Cruz   1,000 mg at 05/19/23 1205    Followed by    [START ON 5/23/2023] acetaminophen (TYLENOL) tablet 1,000 mg  1,000 mg Oral Q6HRS PRN Summer E Pieter        oxyCODONE immediate-release (ROXICODONE) tablet 5 mg  5 mg Oral Q3HRS PRN Summer E Cruz   5 mg at 05/19/23 1303    Or    oxyCODONE immediate release (ROXICODONE) tablet 10 mg  10 mg Oral Q3HRS PRN Summer E Pieter        Or    HYDROmorphone (Dilaudid) injection 0.5 mg  0.5 mg Intravenous Q3HRS PRN Summer E Cruz   0.5 mg at 05/18/23 1647    haloperidol lactate (HALDOL) injection 1 mg  1 mg Intravenous Q6HRS PRN Summer E Pieter        omeprazole (PRILOSEC) capsule 20 mg  20 mg Oral DAILY Umesh Latham M.D.   20 mg at 05/19/23 0542    MD Alert...ICU Electrolyte Replacement per Pharmacy   Other PHARMACY TO DOSE Umesh Latham M.D.           Allergies  Allergies   Allergen Reactions    Morphine Hives and Itching       Physical Exam  Temp:  [36.3 °C (97.4 °F)-36.4 °C (97.6 °F)] 36.4 °C (97.6 °F)  Pulse:  [71-99] 71  Resp:  [0-24] 17  BP: (112-153)/(57-85) 136/76  SpO2:  [91 %-96 %] 96 %    Physical Exam  Vitals and nursing note reviewed.   Constitutional:       Appearance: Normal appearance. She is obese.   HENT:      Head: Normocephalic and atraumatic.      Mouth/Throat:      Mouth: Mucous membranes are moist.   Eyes:      Extraocular Movements: Extraocular movements intact.      Conjunctiva/sclera: Conjunctivae normal.      Pupils: Pupils are equal, round, and reactive to  light.   Cardiovascular:      Rate and Rhythm: Normal rate and regular rhythm.      Pulses: Normal pulses.      Heart sounds: Normal heart sounds. No murmur heard.  Pulmonary:      Breath sounds: Normal breath sounds. No wheezing, rhonchi or rales.   Abdominal:      General: Abdomen is flat. Bowel sounds are normal.      Palpations: Abdomen is soft.      Tenderness: There is no abdominal tenderness. There is no guarding or rebound.   Musculoskeletal:         General: Normal range of motion.      Cervical back: Normal range of motion.      Right lower leg: No edema.      Left lower leg: No edema.   Skin:     General: Skin is warm and dry.   Neurological:      General: No focal deficit present.      Mental Status: She is alert and oriented to person, place, and time.   Psychiatric:         Mood and Affect: Mood normal.         Thought Content: Thought content normal.         Fluids  Date 05/19/23 0700 - 05/20/23 0659   Shift 9867-2039 2695-8235 7206-2178 24 Hour Total   INTAKE   P.O. 180   180   I.V. 378.8   378.8   Shift Total 558.8   558.8   OUTPUT   Urine 625   625   Shift Total 625   625   Weight (kg) 110.7 110.7 110.7 110.7       Laboratory  Recent Labs     05/19/23  0445   WBC 13.1*   RBC 4.65   HEMOGLOBIN 13.5   HEMATOCRIT 41.3   MCV 88.8   MCH 29.0   MCHC 32.7*   RDW 43.0   PLATELETCT 207   MPV 9.3     Recent Labs     05/19/23  0445   SODIUM 138   POTASSIUM 4.1   CHLORIDE 107   CO2 19*   GLUCOSE 108*   BUN 13   CREATININE 0.50   CALCIUM 8.0*                     Imaging  No orders to display       Assessment/Plan  * Trigeminal neuralgia of right side of face- (present on admission)  Assessment & Plan  Pain well controlled status postcraniotomy  Continue postoperative pain management    Weakness  Assessment & Plan  In setting of recent operation  Continue PT to improve strength    Leukocytosis  Assessment & Plan  Likely reactive following surgery  Monitor    S/P craniotomy  Assessment & Plan  Postop day 1  Did  have an episode of vasovagal when attempting to ambulate for the first time since surgery  No change to vitals  Patient encouraged to wait for PT for next attempt  Monitor  Continue postoperative pain management  Patient educated on goal of pain to be 3 or less    Gastroesophageal reflux disease without esophagitis- (present on admission)  Assessment & Plan  Continue home Prilosec    Anxiety and depression- (present on admission)  Assessment & Plan  Continue home bupropion and trazodone    Post-surgical hypothyroidism- (present on admission)  Assessment & Plan  Continue home Synthroid and Cytomel

## 2023-05-19 NOTE — THERAPY
"Occupational Therapy   Initial Evaluation     Patient Name: Ifrah Brunson  Age:  48 y.o., Sex:  female  Medical Record #: [id number]  Today's Date: 5/19/2023       Precautions: Fall Risk    Assessment    Patient is 48 y.o. female with type I trigeminal neuralgia admitted for elective retrosigmoid craniotomy with decompression. Pt seen for OT eval. See grid below for details. Pt c/o nausea at start of session; VSS with sitting and initial stand. Pt was completing 2nd stand with 1-person assist for linen change when she repeatedly c/o feeling \"hot\". Pt then stated she needed to pee, had urinary incontinence and promptly lost consciousness. PT assisted pt safely to EOB then OT & PT returned pt to supine. Multiple staff alerted and responded. Pt immediately became responsive with BP of 128/61, HR 71 BPM and SpO2 91%. Pt currently limited by activity intolerance. Pt is below functional baseline. Will benefit from ongoing acute OT.     Plan    Occupational Therapy Initial Treatment Plan   Treatment Interventions: Self Care / Activities of Daily Living, Therapeutic Activity, Neuro Re-Education / Balance, Adaptive Equipment  Treatment Frequency: 3 Times per Week  Duration: Until Therapy Goals Met    DC Equipment Recommendations: Unable to determine at this time (may need shower chair; continue to assess)  Discharge Recommendations: Anticipate that the patient will have no further occupational therapy needs after discharge from the hospital     Subjective    \"I'm hot.\" (Just prior to LOC)     Objective       05/19/23 1225   Prior Living Situation   Prior Services None   Housing / Facility 1 Story House   Steps Into Home 0   Steps In Home 0   Bathroom Set up Bathtub / Shower Combination   Equipment Owned None   Comments Pt lives with spouse. Mother of pt present and supportive   Prior Level of ADL Function   Comments Pt was independent with BADL PTA   Prior Level of IADL Function   Prior Level Of Mobility " Independent Without Device in Community;Independent Without Device in Home   Driving / Transportation Driving Independent   Occupation (Pre-Hospital Vocational) Employed Full Time;Other (Comments)  ()   Comments Pt was independent with I-ADL and functional mobility PTA   Active ROM Upper Body   Active ROM Upper Body  WDL   Strength Upper Body   Upper Body Strength  WDL   Coordination Upper Body   Coordination WDL   Balance Assessment   Sitting Balance (Static) Fair +   Sitting Balance (Dynamic) Fair   Standing Balance (Static) Fair -   Standing Balance (Dynamic) Poor -   Weight Shift Sitting Fair   Weight Shift Standing Poor   Comments standing with therapist assist; shortly after 2nd stand pt had LOC   Bed Mobility    Supine to Sit Minimal Assist  (HOB elevated)   Sit to Supine Total Assist  (due to LOC)   Scooting Minimal Assist  (seated)   ADL Assessment   Grooming Supervision  (bed level face wipe)   Lower Body Dressing Standby Assist  (don B socks EOB in tailor sit)   Toileting   (pt had sudden urinary incontinence during standing followed by LOC)   Functional Mobility   Sit to Stand Contact Guard Assist   Bed, Chair, Wheelchair Transfer Unable to Participate   Mobility Supine > < EOB, STS x 2   Activity Tolerance   Sitting in Chair NT, unable   Sitting Edge of Bed 5-6 min   Standing <1 min total   Comments OOB activity limited by LOC   Short Term Goals   Short Term Goal # 1 Pt will complete ADL transfers with supv   Short Term Goal # 2 Pt will complete standing grooming with supv   Short Term Goal # 3 Pt will complete toileting with supv

## 2023-05-19 NOTE — CARE PLAN
The patient is Watcher - Medium risk of patient condition declining or worsening    Shift Goals  Clinical Goals: sbp control <160  Patient Goals: pain control  Family Goals: BUTCH    Progress made toward(s) clinical / shift goals:    Problem: Pain - Standard  Goal: Alleviation of pain or a reduction in pain to the patient’s comfort goal  Outcome: Progressing  Flowsheets (Taken 5/19/2023 1408)  OB Pain Intervention:   Medication - See MAR   Rest   Repositioned     Problem: Hemodynamics  Goal: Patient's hemodynamics, fluid balance and neurologic status will be stable or improve  5/19/2023 1408 by Starr Garcia R.N.  Outcome: Progressing  Note: Pt has adequate PO intake, tolerating clear liquids + protein shake; avila has been removed, but purwick in place, will continue to monitor I/O  5/19/2023 1408 by Starr Garcia R.N.  Note: Pt has adequate PO intake, tolerating clear liquids + protein shake; avila has been removed, but purwick in place, will continue to monitor I/O       Patient is not progressing towards the following goals:

## 2023-05-19 NOTE — ASSESSMENT & PLAN NOTE
Pain well controlled status postcraniotomy  Continue postoperative pain management  Continue oral pain medications  Gabapentin.

## 2023-05-19 NOTE — PROGRESS NOTES
"[facility] Progress Note      Admit Date: 5/18/2023    Resident(s): Raissa Sellers M.D.  Attending: ETTA KRISHNAMURTHY/ Dr. Bartlett    Date & Time:   5/19/2023   9:38 AM       Patient ID:    Name:             Ifrah Brunson   YOB: 1975  Age:                 48 y.o.  female   MRN:               [id number]    Hospital Course: (carried forward and updated)  \"I was kindly asked by Dr. Shruthi Echeverria to see and evaluate this 48-year-old lady for critical care management following neurosurgery.  This lady has a history of medically refractory right trigeminal neuralgia, GERD, migraines, anxiety/depression and hypothyroidism.  Today  took her to the operating theater and performed right retrosigmoid craniotomy for microvascular decompression of cranial nerve V and partial internal petrosectomy.  She requires admission to the ICU for very close neurological observation and strict blood pressure control.\" - per Dr. Latham, 5/18    Consultants:  Critical care  Neurosurgery    Procedures:  Right retrosigmoid craniotomy for microvascular decompression of CN V 5/18    Interval Events:  Reviewed last 24 hour events:  -No acute events overnight  -Afebrile, mild elevation in WBC without clinical concerns for infectious processes  -HR 70-90s  -SBP 110s-130s mmHg  -Neuro: AAOx4, no noted cranial nerve deficits, no facial droop appreciated during exam, PERRL,EOMI, no nystagmus, follows commands and responds appropriately, strength 5/5 bilateral upper and lower extremities, sensation intact bilaterally, moves all 4 extremities spontaneously  -GI: clear liquid diet, advance as tolerated  -UOP 1,975, adequate  -Lines: pIV, art line  -Ppx: VTE contraindicated, PPI    Review of Systems   Constitutional:  Negative for chills, fever and malaise/fatigue.   Eyes:  Negative for blurred vision, double vision and photophobia.   Respiratory:  Negative for cough, shortness of breath and wheezing.  "   Cardiovascular:  Negative for chest pain, palpitations and leg swelling.   Gastrointestinal:  Positive for nausea. Negative for abdominal pain and vomiting.   Genitourinary:  Negative for dysuria.   Musculoskeletal:  Negative for myalgias.   Neurological:  Positive for sensory change (numbness to right lower face). Negative for dizziness, weakness and headaches.       PHYSICAL EXAM    Physical Exam  Constitutional:       General: She is not in acute distress.     Appearance: She is not ill-appearing or toxic-appearing.   HENT:      Head: Normocephalic and atraumatic.      Right Ear: External ear normal.      Left Ear: External ear normal.      Nose: Nose normal. No rhinorrhea.      Mouth/Throat:      Mouth: Mucous membranes are moist.      Pharynx: Oropharynx is clear. No oropharyngeal exudate.   Eyes:      General: No scleral icterus.        Right eye: No discharge.         Left eye: No discharge.      Extraocular Movements: Extraocular movements intact.      Pupils: Pupils are equal, round, and reactive to light.   Cardiovascular:      Rate and Rhythm: Normal rate and regular rhythm.      Pulses: Normal pulses.      Heart sounds: No murmur heard.  Pulmonary:      Effort: Pulmonary effort is normal. No respiratory distress.      Breath sounds: Normal breath sounds. No wheezing, rhonchi or rales.   Chest:      Chest wall: No tenderness.   Abdominal:      General: There is no distension.      Palpations: Abdomen is soft.      Tenderness: There is no abdominal tenderness. There is no guarding or rebound.   Musculoskeletal:         General: No swelling or tenderness. Normal range of motion.      Cervical back: Normal range of motion and neck supple.   Skin:     General: Skin is warm and dry.      Capillary Refill: Capillary refill takes less than 2 seconds.   Neurological:      General: No focal deficit present.      Mental Status: She is alert and oriented to person, place, and time.      Cranial Nerves: No cranial  nerve deficit.      Sensory: No sensory deficit.      Motor: No weakness.   Psychiatric:         Mood and Affect: Mood normal.         Behavior: Behavior normal.          Respiratory:     Respiration: 17, Pulse Oximetry: 96 %      HemoDynamics:  Pulse: 76 Blood Pressure: 129/72, Arterial BP: 130/76      Fluids:   Intake/Output Summary (Last 24 hours) at 2023 0721  Last data filed at 2023 0600  Gross per 24 hour   Intake 2410.21 ml   Output 1975 ml   Net 435.21 ml          Body mass index is 39.39 kg/m².    Recent Labs     23  044   SODIUM 138   POTASSIUM 4.1   CHLORIDE 107   CO2 19*   BUN 13   CREATININE 0.50   MAGNESIUM 1.9   PHOSPHORUS 3.4   CALCIUM 8.0*       GI/Nutrition:  Recent Labs     23   GLUCOSE 108*       Heme:  Recent Labs     23   RBC 4.65   HEMOGLOBIN 13.5   HEMATOCRIT 41.3   PLATELETCT 207       Infectious Disease:  Monitored Temp 2  Av.3 °C (99.2 °F)  Min: 37 °C (98.6 °F)  Max: 37.5 °C (99.5 °F)  Temp  Av.7 °C (98.1 °F)  Min: 36.1 °C (97 °F)  Max: 37 °C (98.6 °F)  Recent Labs     23   WBC 13.1*   NEUTSPOLYS 75.30*   LYMPHOCYTES 13.60*   MONOCYTES 9.80   EOSINOPHILS 0.30   BASOPHILS 0.20       Meds:   magnesium sulfate  2 g      buPROPion SR  150 mg      carBAMazepine SR  300 mg      gabapentin  600 mg      levothyroxine  175 mcg      liothyronine  5 mcg      Pharmacy Consult Request  1 Each      MD ALERT...DO NOT ADMINISTER NSAIDS or ASPIRIN unless ORDERED By Neurosurgery  1 Each      ondansetron  4 mg      diphenhydrAMINE  25 mg      labetalol  10 mg      hydrALAZINE  10 mg      cloNIDine  0.1 mg      docusate sodium  100 mg      senna-docusate  1 Tablet      senna-docusate  1 Tablet      polyethylene glycol/lytes  1 Packet      magnesium hydroxide  30 mL      bisacodyl  10 mg      sodium phosphate  1 Each      NS   100 mL/hr at 23 0055    acetaminophen  1,000 mg      Followed by    [START ON 2023] acetaminophen  1,000 mg       oxyCODONE immediate-release  5 mg      Or    oxyCODONE immediate-release  10 mg      Or    HYDROmorphone  0.5 mg      haloperidol lactate  1 mg      omeprazole  20 mg      MD Alert...Adult ICU Electrolyte Replacement per Pharmacy          Imaging:  No orders to display       Assessment and Plan:  * Trigeminal neuralgia of right side of face  Assessment & Plan  Right V3 distribution, described as sharp/shooting/stabbing pain; exacerbated by talking, eating, brushing teeth, touching face. Associated brain fog, memory difficulties and fatigue. Interfering with quality of life  Refractory to medical therapy, requiring surgical intervention  -S/p R retrosigmoid craniotomy with microvascular decompression of CN V and partial internal petrosectomy, per Dr Echeverria 5/18  -Continue with neurochecks per protocol  -BP control, goal per neurosurgery. Continue IV PRN antihypertensives  -Continue home gabapentin and carbamazepine      S/P craniotomy  Assessment & Plan  Please see problem #Trigeminal neuralgia of right side of face    Gastroesophageal reflux disease without esophagitis  Assessment & Plan  -Continue omeprazole    Anxiety and depression  Assessment & Plan  -Continue home Wellbutrin    Post-surgical hypothyroidism  Assessment & Plan  History of thyroid cancer, s/p radiation 2012  -Continue home Synthroid and Cytomel  -Follow up with endocrinology outpatient         Quality Measures:  Feeding:  CLD, advance as tolerated  Analgesia: Ordered as needed  Sedation: N/A  Thromboprophylaxis: C/I  Head of bed: >30 degrees  Ulcer prophylaxis: PPI  Glycemic control: N/A  Bowel care: Bowel regimen as needed  Indwelling lines: pIV, art line  Deescalation of antibiotics: N/A     CODE STATUS:   FULL  DISPO:    Transfer to neurosurgical floor for ongoing management

## 2023-05-19 NOTE — PROGRESS NOTES
Neurosurgery Progress Note    Subjective:  Feeling less nauseous.  Able to keep fluids and some jello down.  Mild head and incision pain.    Exam:  Awake, alert, sitting up in bed.  CHESTER, EOMI, no nystagmus.  Cranial incision with staples c,d,i    BP  Min: 112/65  Max: 146/85  Pulse  Av.8  Min: 76  Max: 104  Resp  Av.8  Min: 15  Max: 24  Temp  Av.7 °C (98.1 °F)  Min: 36.1 °C (97 °F)  Max: 37 °C (98.6 °F)  Monitored Temp 2  Av.3 °C (99.2 °F)  Min: 37 °C (98.6 °F)  Max: 37.5 °C (99.5 °F)  SpO2  Av.5 %  Min: 91 %  Max: 96 %    No data recorded    Recent Labs     23  0445   WBC 13.1*   RBC 4.65   HEMOGLOBIN 13.5   HEMATOCRIT 41.3   MCV 88.8   MCH 29.0   MCHC 32.7*   RDW 43.0   PLATELETCT 207   MPV 9.3     Recent Labs     23  0445   SODIUM 138   POTASSIUM 4.1   CHLORIDE 107   CO2 19*   GLUCOSE 108*   BUN 13   CREATININE 0.50   CALCIUM 8.0*               Intake/Output                         23 07 - 23 0659 23 - 23 0659      Total  Total                 Intake    P.O.  30  150 180  --  -- --    P.O. 30 150 180 -- -- --    I.V.  861.1  1117   --  -- --    Volume (mL) (Lactated Ringers) 500 -- 500 -- -- --    Volume (mL) (NS infusion) 361.1 1117 1478 -- -- --    IV Piggyback  97  155.2 252.2  --  -- --    Volume (mL) (ceFAZolin (Ancef) 2 g in  mL IVPB) 97 155.2 252.2 -- -- --    Total Intake 988.1 1422.2 2410.2 -- -- --       Output    Urine  1150  825 1975  --  -- --    Urine 500 -- 500 -- -- --    Output (mL) (Urethral Catheter Non-latex;Temperature probe 16 Fr.)  -- -- --    Total Output 3870 836 6113 -- -- --       Net I/O     -161.9 597.2 435.2 -- -- --              Intake/Output Summary (Last 24 hours) at 2023 0909  Last data filed at 2023 0600  Gross per 24 hour   Intake 2410.21 ml   Output 1625 ml   Net 785.21 ml             magnesium sulfate  2 g Once    buPROPion SR  150 mg DAILY     carBAMazepine SR  300 mg BID    gabapentin  600 mg TID    levothyroxine  175 mcg Once per day on Mon Tue Wed Thu Fri Sat    liothyronine  5 mcg QAM    Pharmacy Consult Request  1 Each PHARMACY TO DOSE    MD ALERT...DO NOT ADMINISTER NSAIDS or ASPIRIN unless ORDERED By Neurosurgery  1 Each PRN    ondansetron  4 mg Q4HRS PRN    diphenhydrAMINE  25 mg Q6HRS PRN    labetalol  10 mg Q HOUR PRN    hydrALAZINE  10 mg Q HOUR PRN    cloNIDine  0.1 mg Q4HRS PRN    docusate sodium  100 mg BID    senna-docusate  1 Tablet Nightly    senna-docusate  1 Tablet Q24HRS PRN    polyethylene glycol/lytes  1 Packet BID PRN    magnesium hydroxide  30 mL QDAY PRN    bisacodyl  10 mg Q24HRS PRN    sodium phosphate  1 Each Once PRN    NS   Continuous    acetaminophen  1,000 mg Q6HRS    Followed by    [START ON 5/23/2023] acetaminophen  1,000 mg Q6HRS PRN    oxyCODONE immediate-release  5 mg Q3HRS PRN    Or    oxyCODONE immediate-release  10 mg Q3HRS PRN    Or    HYDROmorphone  0.5 mg Q3HRS PRN    haloperidol lactate  1 mg Q6HRS PRN    omeprazole  20 mg DAILY    MD Alert...Adult ICU Electrolyte Replacement per Pharmacy   PHARMACY TO DOSE     The patient is a 48 year old woman presenting with a history of right V3 Type I trigeminal neuralgia.  Her pain was somewhat controlled with medications, but she has been experiencing significant side effects that affect her work and quality of life.      Assessment and Plan:  Hospital day #2  POD #1    right retrosigmoid craniotomy for microvascular decompression of CN V, partial internal petrosectomy.    - Ok for Q4 hr neuro checks and transfer out of ICU.  - Ok to work with PT/OT and OOB as much as tolerated.  - Advance diet as tolerated.  - Continue medication for headaches and pain control.

## 2023-05-19 NOTE — CARE PLAN
The patient is Watcher - Medium risk of patient condition declining or worsening    Shift Goals  Clinical Goals: stable neuro, SBP <160  Patient Goals: rest, pain control  Family Goals: BUTCH    Progress made toward(s) clinical / shift goals:      Problem: Pain - Standard  Goal: Alleviation of pain or a reduction in pain to the patient’s comfort goal  Outcome: Progressing     Problem: Knowledge Deficit - Standard  Goal: Patient and family/care givers will demonstrate understanding of plan of care, disease process/condition, diagnostic tests and medications  Outcome: Progressing     Problem: Psychosocial  Goal: Patient's level of anxiety will decrease  Outcome: Progressing     Problem: Nutrition  Goal: Patient's nutritional and fluid intake will be adequate or improve  Outcome: Progressing     Patient on Q1h neuro checks A&Ox4, verbal numeric pain scale used and PRNs given (see MAR). Patient educated on plan of care, verbalizes understanding. PRNs available for nausea and vomiting, patient tolerating slow advancement of diet well.       Patient is not progressing towards the following goals: N/A

## 2023-05-19 NOTE — ASSESSMENT & PLAN NOTE
In setting of recent operation  Continue PT to improve strength  PT/OT eval.   Hopefully will be able to go home when ok with neurosurgery.

## 2023-05-19 NOTE — ASSESSMENT & PLAN NOTE
Right V3 distribution, described as sharp/shooting/stabbing pain; exacerbated by talking, eating, brushing teeth, touching face. Associated brain fog, memory difficulties and fatigue. Interfering with quality of life  Refractory to medical therapy, requiring surgical intervention  -S/p R retrosigmoid craniotomy with microvascular decompression of CN V and partial internal petrosectomy, per Dr Echeverria 5/18  -Continue with neurochecks per protocol  -BP control, goal per neurosurgery. Continue IV PRN antihypertensives  -Continue home gabapentin and carbamazepine

## 2023-05-19 NOTE — ASSESSMENT & PLAN NOTE
History of thyroid cancer, s/p radiation 2012  -Continue home Synthroid and Cytomel  -Follow up with endocrinology outpatient

## 2023-05-19 NOTE — THERAPY
Physical Therapy   Initial Evaluation     Patient Name: Ifrah Brunson  Age:  48 y.o., Sex:  female  Medical Record #: [id number]  Today's Date: 5/19/2023     Precautions  Precautions: Fall Risk    Assessment  Pt is 48 y.o. female s/p elective right retrosigmoid craniotomy for microvascular decompression of CN V and partial internal petrosectomy.  Upon PT eval, pt presents with impaired activity tolerance and balance, PMH significant for right trigeminal neuralgia, GERD, migraines, anxiety/depression and hypothyroidism. Pt c/o nausea prior to start of session. Pt maintained stable BP throughout bed mobility, sit<>stand x2 (130s/70s). Pt able to perform 1st sit to stand attempt with sup, HHA for balance upon standing. Pt c/o increasing nausea, became warm and diaphoretic. During sit<>stand 2nd attempt, pt became incontinent w/ subsequent LOC. TotalA BTB, pt immediately responsive w/ /61, HR 71 BPM and SpO2 91%. Continued PT services to assess OOB activity and ambulation prior to dc home when medically stable.    Plan    Physical Therapy Initial Treatment Plan   Treatment Plan : Bed Mobility, Gait Training, Neuro Re-Education / Balance, Self Care / Home Evaluation, Therapeutic Activities, Therapeutic Exercise  Treatment Frequency: 3 Times per Week  Duration: Until Therapy Goals Met    DC Equipment Recommendations: None  Discharge Recommendations: As of today, Recommend acute rehab due to age, current presentation, and medical oversight.       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    Prior Level of Functional Mobility   Bed Mobility Independent   Transfer Status Independent   Ambulation Independent    Cognition    Cognition / Consciousness WDL   Level of Consciousness Alert   Passive ROM Lower Body   Passive ROM Lower Body WDL   Active ROM Lower Body    Active ROM Lower Body  WDL   Strength Lower Body   Lower Body Strength  WDL   Sensation Lower Body   Lower Extremity Sensation   WDL   Balance Assessment   Sitting Balance (Static) Fair +   Sitting Balance (Dynamic) Fair   Standing Balance (Static) Fair -   Standing Balance (Dynamic) Poor +   Weight Shift Sitting Fair   Weight Shift Standing Poor   Comments HHA from thereapist during standing balance; LOC after 2nd sit<>stand attempt   Bed Mobility    Supine to Sit Minimal Assist  (Pt reached for HHA from therapist)   Sit to Supine Total Assist  (due to LOC)   Scooting Minimal Assist   Gait Analysis   Gait Level Of Assist Unable to Participate   Functional Mobility   Sit to Stand Contact Guard Assist  (HHA from therapist)   Bed, Chair, Wheelchair Transfer Unable to Participate   ICU Target Mobility Level   ICU Mobility - Targeted Level Level 3A   Activity Tolerance   Sitting in Chair NT   Sitting Edge of Bed 5min   Standing 30sec x2 attempts   Comments activity tolerance limited due to LOC during sit<>stand   Short Term Goals    Short Term Goal # 1 Pt will perform sit<>stand with supervision in 6visits to improve functional independence.   Short Term Goal # 2 Pt will transfer to seated in chair w/ supervision in 6visits.   Short Term Goal # 3 Pt will perform supine<>sit with supervision in 6visits.   Short Term Goal # 4 Pt will ambulate w/o AD for 250ft with supervision to prepare for household/community ambulation.

## 2023-05-19 NOTE — ASSESSMENT & PLAN NOTE
Postop day 2  Did have an episode of vasovagal when attempting to ambulate for the first time since surgery  No change to vitals  Patient encouraged to wait for PT for next attempt  Monitor  Continue postoperative pain management  Patient educated on goal of pain to be 3 or less    Continue monitoring for increasing headache. Nausea in am but improved now.

## 2023-05-20 LAB
ANION GAP SERPL CALC-SCNC: 11 MMOL/L (ref 7–16)
BASOPHILS # BLD AUTO: 0.3 % (ref 0–1.8)
BASOPHILS # BLD: 0.03 K/UL (ref 0–0.12)
BUN SERPL-MCNC: 11 MG/DL (ref 8–22)
CALCIUM SERPL-MCNC: 8.4 MG/DL (ref 8.5–10.5)
CHLORIDE SERPL-SCNC: 103 MMOL/L (ref 96–112)
CO2 SERPL-SCNC: 21 MMOL/L (ref 20–33)
CREAT SERPL-MCNC: 0.51 MG/DL (ref 0.5–1.4)
EOSINOPHIL # BLD AUTO: 0.11 K/UL (ref 0–0.51)
EOSINOPHIL NFR BLD: 1 % (ref 0–6.9)
ERYTHROCYTE [DISTWIDTH] IN BLOOD BY AUTOMATED COUNT: 42.6 FL (ref 35.9–50)
GFR SERPLBLD CREATININE-BSD FMLA CKD-EPI: 115 ML/MIN/1.73 M 2
GLUCOSE SERPL-MCNC: 108 MG/DL (ref 65–99)
HCT VFR BLD AUTO: 41.5 % (ref 37–47)
HGB BLD-MCNC: 13.7 G/DL (ref 12–16)
IMM GRANULOCYTES # BLD AUTO: 0.09 K/UL (ref 0–0.11)
IMM GRANULOCYTES NFR BLD AUTO: 0.8 % (ref 0–0.9)
LYMPHOCYTES # BLD AUTO: 1.75 K/UL (ref 1–4.8)
LYMPHOCYTES NFR BLD: 16.4 % (ref 22–41)
MAGNESIUM SERPL-MCNC: 2.2 MG/DL (ref 1.5–2.5)
MCH RBC QN AUTO: 29.1 PG (ref 27–33)
MCHC RBC AUTO-ENTMCNC: 33 G/DL (ref 33.6–35)
MCV RBC AUTO: 88.3 FL (ref 81.4–97.8)
MONOCYTES # BLD AUTO: 1.06 K/UL (ref 0–0.85)
MONOCYTES NFR BLD AUTO: 10 % (ref 0–13.4)
NEUTROPHILS # BLD AUTO: 7.6 K/UL (ref 2–7.15)
NEUTROPHILS NFR BLD: 71.5 % (ref 44–72)
NRBC # BLD AUTO: 0 K/UL
NRBC BLD-RTO: 0 /100 WBC
PHOSPHATE SERPL-MCNC: 2.6 MG/DL (ref 2.5–4.5)
PLATELET # BLD AUTO: 212 K/UL (ref 164–446)
PMV BLD AUTO: 9.3 FL (ref 9–12.9)
POTASSIUM SERPL-SCNC: 4.1 MMOL/L (ref 3.6–5.5)
RBC # BLD AUTO: 4.7 M/UL (ref 4.2–5.4)
SODIUM SERPL-SCNC: 135 MMOL/L (ref 135–145)
WBC # BLD AUTO: 10.6 K/UL (ref 4.8–10.8)

## 2023-05-20 PROCEDURE — 770006 HCHG ROOM/CARE - MED/SURG/GYN SEMI*

## 2023-05-20 PROCEDURE — 84100 ASSAY OF PHOSPHORUS: CPT

## 2023-05-20 PROCEDURE — A9270 NON-COVERED ITEM OR SERVICE: HCPCS | Performed by: INTERNAL MEDICINE

## 2023-05-20 PROCEDURE — 700102 HCHG RX REV CODE 250 W/ 637 OVERRIDE(OP)

## 2023-05-20 PROCEDURE — 36415 COLL VENOUS BLD VENIPUNCTURE: CPT

## 2023-05-20 PROCEDURE — 700111 HCHG RX REV CODE 636 W/ 250 OVERRIDE (IP)

## 2023-05-20 PROCEDURE — 85025 COMPLETE CBC W/AUTO DIFF WBC: CPT

## 2023-05-20 PROCEDURE — 80048 BASIC METABOLIC PNL TOTAL CA: CPT

## 2023-05-20 PROCEDURE — A9270 NON-COVERED ITEM OR SERVICE: HCPCS

## 2023-05-20 PROCEDURE — 83735 ASSAY OF MAGNESIUM: CPT

## 2023-05-20 PROCEDURE — 99232 SBSQ HOSP IP/OBS MODERATE 35: CPT | Performed by: HOSPITALIST

## 2023-05-20 PROCEDURE — 700102 HCHG RX REV CODE 250 W/ 637 OVERRIDE(OP): Performed by: INTERNAL MEDICINE

## 2023-05-20 RX ADMIN — ONDANSETRON 4 MG: 2 INJECTION INTRAMUSCULAR; INTRAVENOUS at 15:07

## 2023-05-20 RX ADMIN — LIOTHYRONINE SODIUM 5 MCG: 5 TABLET ORAL at 06:04

## 2023-05-20 RX ADMIN — CARBAMAZEPINE 300 MG: 100 TABLET, EXTENDED RELEASE ORAL at 08:35

## 2023-05-20 RX ADMIN — GABAPENTIN 600 MG: 300 CAPSULE ORAL at 20:36

## 2023-05-20 RX ADMIN — ACETAMINOPHEN 1000 MG: 500 TABLET, FILM COATED ORAL at 06:02

## 2023-05-20 RX ADMIN — ACETAMINOPHEN 1000 MG: 500 TABLET, FILM COATED ORAL at 00:46

## 2023-05-20 RX ADMIN — ACETAMINOPHEN 1000 MG: 500 TABLET, FILM COATED ORAL at 17:19

## 2023-05-20 RX ADMIN — DOCUSATE SODIUM 100 MG: 100 CAPSULE, LIQUID FILLED ORAL at 17:19

## 2023-05-20 RX ADMIN — CARBAMAZEPINE 300 MG: 100 TABLET, EXTENDED RELEASE ORAL at 17:19

## 2023-05-20 RX ADMIN — OXYCODONE HYDROCHLORIDE 5 MG: 5 TABLET ORAL at 21:56

## 2023-05-20 RX ADMIN — BUPROPION HYDROCHLORIDE 150 MG: 150 TABLET, EXTENDED RELEASE ORAL at 06:04

## 2023-05-20 RX ADMIN — ONDANSETRON 4 MG: 2 INJECTION INTRAMUSCULAR; INTRAVENOUS at 09:03

## 2023-05-20 RX ADMIN — DOCUSATE SODIUM 100 MG: 100 CAPSULE, LIQUID FILLED ORAL at 06:03

## 2023-05-20 RX ADMIN — SENNOSIDES AND DOCUSATE SODIUM 1 TABLET: 50; 8.6 TABLET ORAL at 20:36

## 2023-05-20 RX ADMIN — OXYCODONE HYDROCHLORIDE 5 MG: 5 TABLET ORAL at 15:06

## 2023-05-20 RX ADMIN — GABAPENTIN 600 MG: 300 CAPSULE ORAL at 15:06

## 2023-05-20 RX ADMIN — OMEPRAZOLE 20 MG: 20 CAPSULE, DELAYED RELEASE ORAL at 06:04

## 2023-05-20 RX ADMIN — GABAPENTIN 600 MG: 300 CAPSULE ORAL at 08:36

## 2023-05-20 RX ADMIN — ACETAMINOPHEN 1000 MG: 500 TABLET, FILM COATED ORAL at 12:51

## 2023-05-20 RX ADMIN — LEVOTHYROXINE SODIUM 175 MCG: 0.17 TABLET ORAL at 06:03

## 2023-05-20 ASSESSMENT — ENCOUNTER SYMPTOMS
PALPITATIONS: 0
BACK PAIN: 0
DOUBLE VISION: 0
HEMOPTYSIS: 0
COUGH: 0
HEADACHES: 1
PND: 0
FEVER: 0
MYALGIAS: 0
NAUSEA: 1
DIZZINESS: 0
BLURRED VISION: 0
WHEEZING: 0
BRUISES/BLEEDS EASILY: 0
CLAUDICATION: 0
VOMITING: 1
CHILLS: 0
HEARTBURN: 0
DEPRESSION: 0

## 2023-05-20 ASSESSMENT — PAIN DESCRIPTION - PAIN TYPE
TYPE: ACUTE PAIN

## 2023-05-20 ASSESSMENT — FIBROSIS 4 INDEX: FIB4 SCORE: 0.59

## 2023-05-20 NOTE — PROGRESS NOTES
Neuro APRJUDITH cid made rounds notified her of patient complaining of right posterior head numbness. Per provider, that is expected sometimes and will resolve with time.

## 2023-05-20 NOTE — PROGRESS NOTES
Neurosurgery Progress Note    Subjective:  Feeling less nauseous except with meds this am  Mild head and incision pain, new numbness to area    Exam:  Awake, alert, sitting up in bed.  CHESTER, EOMI, no nystagmus.  Cranial incision with staples c,d,i    BP  Min: 127/83  Max: 145/82  Pulse  Av.6  Min: 71  Max: 98  Resp  Av.6  Min: 0  Max: 26  Temp  Av.7 °C (98 °F)  Min: 36.3 °C (97.4 °F)  Max: 37.3 °C (99.1 °F)  Monitored Temp 2  Av.4 °C (99.3 °F)  Min: 37.4 °C (99.3 °F)  Max: 37.4 °C (99.3 °F)  SpO2  Av.4 %  Min: 92 %  Max: 96 %    No data recorded    Recent Labs     235 23  0206   WBC 13.1* 10.6   RBC 4.65 4.70   HEMOGLOBIN 13.5 13.7   HEMATOCRIT 41.3 41.5   MCV 88.8 88.3   MCH 29.0 29.1   MCHC 32.7* 33.0*   RDW 43.0 42.6   PLATELETCT 207 212   MPV 9.3 9.3       Recent Labs     235 23  0206   SODIUM 138 135   POTASSIUM 4.1 4.1   CHLORIDE 107 103   CO2 19* 21   GLUCOSE 108* 108*   BUN 13 11   CREATININE 0.50 0.51   CALCIUM 8.0* 8.4*                 Intake/Output                         23 - 23 0659 23 - 23 0659     1900-0659 Total 1900-0659 Total                 Intake    P.O.  1140  -- 1140  --  -- --    P.O. 1140 -- 1140 -- -- --    I.V.  583.5  -- 583.5  --  -- --    Magnesium Sulfate Volume 49.6 -- 49.6 -- -- --    Volume (mL) (NS infusion) 534 -- 534 -- -- --    Total Intake 1723.5 -- 1723.5 -- -- --       Output    Urine  2225  --   850  -- 850    Number of Times Incontinent of Urine 1 x -- 1 x -- -- --    Urine Void (mL)  --  850 --     Stool  --  -- --  --  -- --    Number of Times Stooled 0 x -- 0 x -- -- --    Total Output  --  850 --        Net I/O     -501.5 -- -501.5 -850 -- -850              Intake/Output Summary (Last 24 hours) at 2023 1000  Last data filed at 2023 0900  Gross per 24 hour   Intake 1164.72 ml   Output 2450 ml   Net -1285.28 ml                buPROPion SR  150 mg DAILY    carBAMazepine SR  300 mg BID    gabapentin  600 mg TID    levothyroxine  175 mcg Once per day on Mon Tue Wed Thu Fri Sat    liothyronine  5 mcg QAM    Pharmacy Consult Request  1 Each PHARMACY TO DOSE    MD ALERT...DO NOT ADMINISTER NSAIDS or ASPIRIN unless ORDERED By Neurosurgery  1 Each PRN    ondansetron  4 mg Q4HRS PRN    diphenhydrAMINE  25 mg Q6HRS PRN    labetalol  10 mg Q HOUR PRN    hydrALAZINE  10 mg Q HOUR PRN    cloNIDine  0.1 mg Q4HRS PRN    docusate sodium  100 mg BID    senna-docusate  1 Tablet Nightly    senna-docusate  1 Tablet Q24HRS PRN    polyethylene glycol/lytes  1 Packet BID PRN    magnesium hydroxide  30 mL QDAY PRN    bisacodyl  10 mg Q24HRS PRN    sodium phosphate  1 Each Once PRN    acetaminophen  1,000 mg Q6HRS    Followed by    [START ON 5/23/2023] acetaminophen  1,000 mg Q6HRS PRN    oxyCODONE immediate-release  5 mg Q3HRS PRN    Or    oxyCODONE immediate-release  10 mg Q3HRS PRN    Or    HYDROmorphone  0.5 mg Q3HRS PRN    haloperidol lactate  1 mg Q6HRS PRN    omeprazole  20 mg DAILY    MD Alert...Adult ICU Electrolyte Replacement per Pharmacy   PHARMACY TO DOSE     The patient is a 48 year old woman presenting with a history of right V3 Type I trigeminal neuralgia.  Her pain was somewhat controlled with medications, but she has been experiencing significant side effects that affect her work and quality of life.      Assessment and Plan:  POD #2 s/p right retrosigmoid craniotomy for microvascular decompression of CN V, partial internal petrosectomy.    - Continue neuro checks Q4  - retry PT/OT   - Continue pain control  - labs ok  - monitor numbness  - dispo pending

## 2023-05-20 NOTE — CARE PLAN
The patient is Stable - Low risk of patient condition declining or worsening    Shift Goals  Clinical Goals: Stable hemodynamics  Patient Goals: comfort  Family Goals: BUTCH    Progress made toward(s) clinical / shift goals:        Problem: Pain - Standard  Goal: Alleviation of pain or a reduction in pain to the patient’s comfort goal  Outcome: Progressing     Problem: Pain - Standard  Goal: Alleviation of pain or a reduction in pain to the patient’s comfort goal  Outcome: Progressing     Problem: Knowledge Deficit - Standard  Goal: Patient and family/care givers will demonstrate understanding of plan of care, disease process/condition, diagnostic tests and medications  Outcome: Progressing     Problem: Psychosocial  Goal: Patient's level of anxiety will decrease  Outcome: Progressing  Goal: Patient's ability to verbalize feelings about condition will improve  Outcome: Progressing  Goal: Patient's ability to re-evaluate and adapt role responsibilities will improve  Outcome: Progressing  Goal: Patient and family will demonstrate ability to cope with life altering diagnosis and/or procedure  Outcome: Progressing  Goal: Spiritual and cultural needs incorporated into hospitalization  Outcome: Progressing     Problem: Hemodynamics  Goal: Patient's hemodynamics, fluid balance and neurologic status will be stable or improve  Outcome: Progressing     Problem: Urinary Elimination  Goal: Establish and maintain regular urinary output  Outcome: Progressing

## 2023-05-20 NOTE — PROGRESS NOTES
6:05pm Patient taken via transport to Neurosciences room 177-2. Report given to SILVER Hogan, all questions answered at this time.

## 2023-05-20 NOTE — PROGRESS NOTES
[facility] Medicine Daily Progress Note    Date of Service  5/20/2023    Chief Complaint  Ifrah Brunson is a 48 y.o. female admitted 5/18/2023 for elective right retrosigmoid craniotomy for microvascular decompression of cranial nerve V and partial internal petrosectomy        Interval Problem Update  5/20 patient in bed, family at bedside, patient is in not distress, no fever or chills had nausea in am but better now, no focal weakness, no numbness or tingling. PT/OT eval today.   Labs results, imaging and notes reviewed.   Discussed with patient, family, nurse staff, .     I have discussed this patient's plan of care and discharge plan at IDT rounds today with Case Management, Nursing, Nursing leadership, and other members of the IDT team.    Consultants/Specialty  Neurosurgery.     Code Status  Full Code    Disposition  Stable from IM standpoint.   I have placed the appropriate orders for post-discharge needs.    Review of Systems  Review of Systems   Constitutional:  Negative for chills and fever.   HENT:  Negative for congestion and nosebleeds.    Eyes:  Negative for blurred vision and double vision.   Respiratory:  Negative for cough, hemoptysis and wheezing.    Cardiovascular:  Negative for chest pain, palpitations, claudication, leg swelling and PND.   Gastrointestinal:  Positive for nausea and vomiting. Negative for heartburn.   Genitourinary:  Negative for hematuria and urgency.   Musculoskeletal:  Negative for back pain and myalgias.   Skin:  Negative for rash.   Neurological:  Positive for headaches. Negative for dizziness.   Endo/Heme/Allergies:  Does not bruise/bleed easily.   Psychiatric/Behavioral:  Negative for depression.         Physical Exam  Temp:  [36.3 °C (97.4 °F)-37.3 °C (99.1 °F)] 36.7 °C (98.1 °F)  Pulse:  [71-98] 98  Resp:  [17-26] 18  BP: (127-141)/(68-85) 131/85  SpO2:  [93 %-96 %] 94 %    Physical Exam  Vitals and nursing note reviewed.   Constitutional:        General: She is not in acute distress.     Appearance: Normal appearance.   HENT:      Head: Normocephalic.      Nose: Nose normal.   Eyes:      General: No scleral icterus.        Right eye: No discharge.         Left eye: No discharge.      Conjunctiva/sclera: Conjunctivae normal.   Cardiovascular:      Rate and Rhythm: Normal rate and regular rhythm.      Pulses: Normal pulses.      Heart sounds: Normal heart sounds.   Pulmonary:      Effort: Pulmonary effort is normal. No respiratory distress.      Breath sounds: Normal breath sounds.   Abdominal:      General: Bowel sounds are normal. There is no distension.      Palpations: Abdomen is soft.      Tenderness: There is no abdominal tenderness. There is no guarding.   Musculoskeletal:         General: Normal range of motion.      Cervical back: Normal range of motion and neck supple.      Right lower leg: No edema.      Left lower leg: No edema.   Skin:     General: Skin is warm and dry.      Capillary Refill: Capillary refill takes less than 2 seconds.      Coloration: Skin is not jaundiced.   Neurological:      General: No focal deficit present.      Mental Status: She is alert and oriented to person, place, and time.      Cranial Nerves: No cranial nerve deficit.      Motor: No weakness.   Psychiatric:         Mood and Affect: Mood normal.         Behavior: Behavior normal.         Fluids    Intake/Output Summary (Last 24 hours) at 5/20/2023 1300  Last data filed at 5/20/2023 0900  Gross per 24 hour   Intake 729.17 ml   Output 2450 ml   Net -1720.83 ml       Laboratory  Recent Labs     05/19/23  0445 05/20/23  0206   WBC 13.1* 10.6   RBC 4.65 4.70   HEMOGLOBIN 13.5 13.7   HEMATOCRIT 41.3 41.5   MCV 88.8 88.3   MCH 29.0 29.1   MCHC 32.7* 33.0*   RDW 43.0 42.6   PLATELETCT 207 212   MPV 9.3 9.3     Recent Labs     05/19/23  0445 05/20/23  0206   SODIUM 138 135   POTASSIUM 4.1 4.1   CHLORIDE 107 103   CO2 19* 21   GLUCOSE 108* 108*   BUN 13 11   CREATININE  0.50 0.51   CALCIUM 8.0* 8.4*                   Imaging  No orders to display        Assessment/Plan  * Trigeminal neuralgia of right side of face- (present on admission)  Assessment & Plan  Pain well controlled status postcraniotomy  Continue postoperative pain management  Continue oral pain medications  Gabapentin.     Weakness  Assessment & Plan  In setting of recent operation  Continue PT to improve strength  PT/OT eval.   Hopefully will be able to go home when ok with neurosurgery.    Leukocytosis  Assessment & Plan  Likely reactive following surgery  Monitor  Trending down. No fever or signs of infection.     S/P craniotomy  Assessment & Plan  Postop day 2  Did have an episode of vasovagal when attempting to ambulate for the first time since surgery  No change to vitals  Patient encouraged to wait for PT for next attempt  Monitor  Continue postoperative pain management  Patient educated on goal of pain to be 3 or less    Continue monitoring for increasing headache. Nausea in am but improved now.     Gastroesophageal reflux disease without esophagitis- (present on admission)  Assessment & Plan  Continue home Prilosec  Had nausea this am but better now .    Anxiety and depression- (present on admission)  Assessment & Plan  Continue home bupropion and trazodone  Calm and cooperative.     Post-surgical hypothyroidism- (present on admission)  Assessment & Plan  Continue home Synthroid and Cytomel  Monitoring.          VTE prophylaxis: SCDs/TEDs    I have performed a physical exam and reviewed and updated ROS and Plan today (5/20/2023). In review of yesterday's note (5/19/2023), there are no changes except as documented above.      Stable from IM standpoint.   Will f/u peripherally.   Please feel free to call with questions.

## 2023-05-21 ENCOUNTER — PHARMACY VISIT (OUTPATIENT)
Dept: PHARMACY | Facility: MEDICAL CENTER | Age: 48
End: 2023-05-21
Payer: COMMERCIAL

## 2023-05-21 VITALS
TEMPERATURE: 98.8 F | HEART RATE: 73 BPM | SYSTOLIC BLOOD PRESSURE: 130 MMHG | OXYGEN SATURATION: 90 % | HEIGHT: 66 IN | BODY MASS INDEX: 40.18 KG/M2 | RESPIRATION RATE: 17 BRPM | WEIGHT: 250 LBS | DIASTOLIC BLOOD PRESSURE: 83 MMHG

## 2023-05-21 LAB
ANION GAP SERPL CALC-SCNC: 12 MMOL/L (ref 7–16)
BASOPHILS # BLD AUTO: 0.2 % (ref 0–1.8)
BASOPHILS # BLD: 0.02 K/UL (ref 0–0.12)
BUN SERPL-MCNC: 14 MG/DL (ref 8–22)
CALCIUM SERPL-MCNC: 8.9 MG/DL (ref 8.5–10.5)
CHLORIDE SERPL-SCNC: 105 MMOL/L (ref 96–112)
CO2 SERPL-SCNC: 22 MMOL/L (ref 20–33)
CREAT SERPL-MCNC: 0.61 MG/DL (ref 0.5–1.4)
EOSINOPHIL # BLD AUTO: 0.16 K/UL (ref 0–0.51)
EOSINOPHIL NFR BLD: 1.7 % (ref 0–6.9)
ERYTHROCYTE [DISTWIDTH] IN BLOOD BY AUTOMATED COUNT: 42.4 FL (ref 35.9–50)
GFR SERPLBLD CREATININE-BSD FMLA CKD-EPI: 110 ML/MIN/1.73 M 2
GLUCOSE SERPL-MCNC: 111 MG/DL (ref 65–99)
HCT VFR BLD AUTO: 42 % (ref 37–47)
HGB BLD-MCNC: 13.7 G/DL (ref 12–16)
IMM GRANULOCYTES # BLD AUTO: 0.06 K/UL (ref 0–0.11)
IMM GRANULOCYTES NFR BLD AUTO: 0.6 % (ref 0–0.9)
LYMPHOCYTES # BLD AUTO: 1.89 K/UL (ref 1–4.8)
LYMPHOCYTES NFR BLD: 20 % (ref 22–41)
MAGNESIUM SERPL-MCNC: 2.1 MG/DL (ref 1.5–2.5)
MCH RBC QN AUTO: 28.8 PG (ref 27–33)
MCHC RBC AUTO-ENTMCNC: 32.6 G/DL (ref 33.6–35)
MCV RBC AUTO: 88.2 FL (ref 81.4–97.8)
MONOCYTES # BLD AUTO: 0.84 K/UL (ref 0–0.85)
MONOCYTES NFR BLD AUTO: 8.9 % (ref 0–13.4)
NEUTROPHILS # BLD AUTO: 6.46 K/UL (ref 2–7.15)
NEUTROPHILS NFR BLD: 68.6 % (ref 44–72)
NRBC # BLD AUTO: 0 K/UL
NRBC BLD-RTO: 0 /100 WBC
PHOSPHATE SERPL-MCNC: 3.3 MG/DL (ref 2.5–4.5)
PLATELET # BLD AUTO: 201 K/UL (ref 164–446)
PMV BLD AUTO: 9.7 FL (ref 9–12.9)
POTASSIUM SERPL-SCNC: 4.2 MMOL/L (ref 3.6–5.5)
RBC # BLD AUTO: 4.76 M/UL (ref 4.2–5.4)
SODIUM SERPL-SCNC: 139 MMOL/L (ref 135–145)
WBC # BLD AUTO: 9.4 K/UL (ref 4.8–10.8)

## 2023-05-21 PROCEDURE — 36415 COLL VENOUS BLD VENIPUNCTURE: CPT

## 2023-05-21 PROCEDURE — 97530 THERAPEUTIC ACTIVITIES: CPT

## 2023-05-21 PROCEDURE — A9270 NON-COVERED ITEM OR SERVICE: HCPCS

## 2023-05-21 PROCEDURE — 84100 ASSAY OF PHOSPHORUS: CPT

## 2023-05-21 PROCEDURE — 85025 COMPLETE CBC W/AUTO DIFF WBC: CPT

## 2023-05-21 PROCEDURE — 700102 HCHG RX REV CODE 250 W/ 637 OVERRIDE(OP): Performed by: INTERNAL MEDICINE

## 2023-05-21 PROCEDURE — 700102 HCHG RX REV CODE 250 W/ 637 OVERRIDE(OP)

## 2023-05-21 PROCEDURE — 80048 BASIC METABOLIC PNL TOTAL CA: CPT

## 2023-05-21 PROCEDURE — 83735 ASSAY OF MAGNESIUM: CPT

## 2023-05-21 PROCEDURE — A9270 NON-COVERED ITEM OR SERVICE: HCPCS | Performed by: INTERNAL MEDICINE

## 2023-05-21 PROCEDURE — 700111 HCHG RX REV CODE 636 W/ 250 OVERRIDE (IP)

## 2023-05-21 RX ADMIN — DOCUSATE SODIUM 100 MG: 100 CAPSULE, LIQUID FILLED ORAL at 06:06

## 2023-05-21 RX ADMIN — OMEPRAZOLE 20 MG: 20 CAPSULE, DELAYED RELEASE ORAL at 06:06

## 2023-05-21 RX ADMIN — ACETAMINOPHEN 1000 MG: 500 TABLET, FILM COATED ORAL at 00:52

## 2023-05-21 RX ADMIN — GABAPENTIN 600 MG: 300 CAPSULE ORAL at 08:40

## 2023-05-21 RX ADMIN — CARBAMAZEPINE 300 MG: 100 TABLET, EXTENDED RELEASE ORAL at 08:40

## 2023-05-21 RX ADMIN — LIOTHYRONINE SODIUM 5 MCG: 5 TABLET ORAL at 06:06

## 2023-05-21 RX ADMIN — ONDANSETRON 4 MG: 2 INJECTION INTRAMUSCULAR; INTRAVENOUS at 09:30

## 2023-05-21 RX ADMIN — ACETAMINOPHEN 1000 MG: 500 TABLET, FILM COATED ORAL at 06:06

## 2023-05-21 RX ADMIN — ACETAMINOPHEN 1000 MG: 500 TABLET, FILM COATED ORAL at 12:17

## 2023-05-21 RX ADMIN — BUPROPION HYDROCHLORIDE 150 MG: 150 TABLET, EXTENDED RELEASE ORAL at 06:06

## 2023-05-21 ASSESSMENT — COGNITIVE AND FUNCTIONAL STATUS - GENERAL
MOBILITY SCORE: 21
SUGGESTED CMS G CODE MODIFIER MOBILITY: CJ
STANDING UP FROM CHAIR USING ARMS: A LITTLE
WALKING IN HOSPITAL ROOM: A LITTLE
CLIMB 3 TO 5 STEPS WITH RAILING: A LITTLE

## 2023-05-21 ASSESSMENT — GAIT ASSESSMENTS
DISTANCE (FEET): 100
ASSISTIVE DEVICE: FRONT WHEEL WALKER
GAIT LEVEL OF ASSIST: MODIFIED INDEPENDENT
DEVIATION: BRADYKINETIC

## 2023-05-21 NOTE — CARE PLAN
The patient is Stable - Low risk of patient condition declining or worsening    Shift Goals  Clinical Goals: Stable hemodynamics  Patient Goals: comfort  Family Goals: BUTCH    Progress made toward(s) clinical / shift goals:  stable neuro checks    Patient is not progressing towards the following goals:

## 2023-05-21 NOTE — PROGRESS NOTES
Patient discharged home. IV removed. Discharge instructions given to patient and family at bedside. All questions answered.

## 2023-05-21 NOTE — DISCHARGE PLANNING
Received choice form @: [id number]  Agency/Facility name: [facility]  Sent referral per choice form @: [phone]

## 2023-05-21 NOTE — PROGRESS NOTES
Neurosurgery Progress Note    Subjective:  Feeling less nauseous, no HA  States trigeminal neuralgia improved  Not been OOB much    Exam:  AAOx4 TOngue ML, no drift  Min dysmetria- FCx4  CDI with staples    BP  Min: 128/82  Max: 138/82  Pulse  Av  Min: 73  Max: 79  Resp  Av.3  Min: 16  Max: 18  Temp  Av.8 °C (98.2 °F)  Min: 36.5 °C (97.7 °F)  Max: 37.1 °C (98.8 °F)  SpO2  Av.3 %  Min: 90 %  Max: 93 %    No data recorded    Recent Labs     23  0445 23  0206 23  0353   WBC 13.1* 10.6 9.4   RBC 4.65 4.70 4.76   HEMOGLOBIN 13.5 13.7 13.7   HEMATOCRIT 41.3 41.5 42.0   MCV 88.8 88.3 88.2   MCH 29.0 29.1 28.8   MCHC 32.7* 33.0* 32.6*   RDW 43.0 42.6 42.4   PLATELETCT 207 212 201   MPV 9.3 9.3 9.7       Recent Labs     23  0445 23  0206 23  0638   SODIUM 138 135 139   POTASSIUM 4.1 4.1 4.2   CHLORIDE 107 103 105   CO2 19* 21 22   GLUCOSE 108* 108* 111*   BUN 13 11 14   CREATININE 0.50 0.51 0.61   CALCIUM 8.0* 8.4* 8.9                 Intake/Output                         23 07 - 23 0659 23 07 - 23 0659      Total  Total                 Intake    P.O.  240  -- 240  --  -- --    P.O. 240 -- 240 -- -- --    Total Intake 240 -- 240 -- -- --       Output    Urine  2500  500 3000  --  -- --    Urine Void (mL)  -- -- --    Output (mL) (External Urinary Catheter (Female Wick)) 1650 -- 1650 -- -- --    Total Output 2500 500 3000 -- -- --       Net I/O     -2260 -500 -9940 -- -- --              Intake/Output Summary (Last 24 hours) at 2023 0914  Last data filed at 2023 0606  Gross per 24 hour   Intake --   Output 1300 ml   Net -1300 ml               buPROPion SR  150 mg DAILY    carBAMazepine SR  300 mg BID    gabapentin  600 mg TID    levothyroxine  175 mcg Once per day on Mon Tue Wed Thu Fri Sat    liothyronine  5 mcg QAM    Pharmacy Consult Request  1 Each PHARMACY TO DOSE    MD ALERT...DO  NOT ADMINISTER NSAIDS or ASPIRIN unless ORDERED By Neurosurgery  1 Each PRN    ondansetron  4 mg Q4HRS PRN    diphenhydrAMINE  25 mg Q6HRS PRN    labetalol  10 mg Q HOUR PRN    hydrALAZINE  10 mg Q HOUR PRN    cloNIDine  0.1 mg Q4HRS PRN    docusate sodium  100 mg BID    senna-docusate  1 Tablet Nightly    senna-docusate  1 Tablet Q24HRS PRN    polyethylene glycol/lytes  1 Packet BID PRN    magnesium hydroxide  30 mL QDAY PRN    bisacodyl  10 mg Q24HRS PRN    sodium phosphate  1 Each Once PRN    acetaminophen  1,000 mg Q6HRS    Followed by    [START ON 5/23/2023] acetaminophen  1,000 mg Q6HRS PRN    oxyCODONE immediate-release  5 mg Q3HRS PRN    Or    oxyCODONE immediate-release  10 mg Q3HRS PRN    Or    HYDROmorphone  0.5 mg Q3HRS PRN    haloperidol lactate  1 mg Q6HRS PRN    omeprazole  20 mg DAILY     The patient is a 48 year old woman presenting with a history of right V3 Type I trigeminal neuralgia.  Her pain was somewhat controlled with medications, but she has been experiencing significant side effects that affect her work and quality of life.      Assessment and Plan:  POD #3 s/p right retrosigmoid craniotomy for microvascular decompression of CN V, partial internal petrosectomy.    - Continue neuro checks Q4  - retry PT/OT   - Continue pain control  DW patient/mom- would like - will order  Home later today if cleared by PT

## 2023-05-21 NOTE — THERAPY
Physical Therapy   Daily Treatment     Patient Name: Ifrah Brunson  Age:  48 y.o., Sex:  female  Medical Record #: [id number]  Today's Date: 5/21/2023     Precautions  Precautions: Fall Risk  Comments: avoid bending forwward/increasing pressure    Assessment    Pt is seen for follow up visit  while acute - pt reports increased tolerance of upright activity, without pass out/loss of conscience episode that happened with therapy the other day.  Patient states she has tolerated up to the bathroom today, because nausea and pain are well controlled.      Today - she demonstrates safety and independence with bed mobility, transfers and gait with FWW for 100 feet - she demonstrates slight decreased erick with ambulation, only light reliance of the FWW - but with good safety awareness.  Recommend SBA at the step to enter the house and in/out of shower - as well as continued use of the FWW - home health would be indicated to help ensure safe discharge as well as home safety equipment (bathroom) if needed.    Plan    Treatment Plan Status:  Until goals met  Type of Treatment: Bed Mobility, Gait Training, Neuro Re-Education / Balance, Self Care / Home Evaluation, Therapeutic Activities, Therapeutic Exercise  Treatment Frequency: 3 Times per Week  Treatment Duration: Until Therapy Goals Met    DC Equipment Recommendations: Front-Wheel Walker (patient already owns)  Discharge Recommendations: Recommend home health for continued physical therapy services      Subjective    Pt agreeable to participate, anticipating DC today.     Objective       05/21/23 1036    Services   Is patient using  services for this encounter? No   Precautions   Precautions Fall Risk   Comments avoid bending forwward/increasing pressure   Vitals   Vitals Comments 91-92% on room air   Pain 0 - 10 Group   Therapist Pain Assessment Post Activity Pain Same as Prior to Activity;0   Cognition    Comments pleasant and  receptive to education, mom present and supportive   Active ROM Lower Body    Active ROM Lower Body  WDL   Strength Lower Body   Lower Body Strength  WDL   Comments pt donned non skid socks prior to walk - able to sit figure 4 to avoid inc. pressures   Home Exercise Program   Comments Reviewed ankle pumps or drawing alphabet with toes, intermittently when sitting, taking deep breaths all the way in/out throughout the day, several times per hour   Balance   Sitting Balance (Static) Good   Sitting Balance (Dynamic) Good   Standing Balance (Static) Fair +   Standing Balance (Dynamic) Fair   Comments Able to perform toilet ADL, standing to wash hands, don socks in sitting - when stadning and walking - improved safety with FWW   Bed Mobility    Supine to Sit Independent   Sit to Supine Independent   Comments to/from bed slightly elevated at head of bed   Gait Analysis   Gait Level Of Assist Modified Independent   Assistive Device Front Wheel Walker   Distance (Feet) 100   # of Times Distance was Traveled 1   Deviation Bradykinetic   Skilled Intervention Verbal Cuing   Functional Mobility   Sit to Stand Modified Independent   Bed, Chair, Wheelchair Transfer Modified Independent   Toilet Transfers Supervised   Mobility bed>bathroom>gait in damian>btb - walker cues as needed   Skilled Intervention Verbal Cuing   How much difficulty does the patient currently have...   Turning over in bed (including adjusting bedclothes, sheets and blankets)? 4   Sitting down on and standing up from a chair with arms (e.g., wheelchair, bedside commode, etc.) 4   Moving from lying on back to sitting on the side of the bed? 4   How much help from another person does the patient currently need...   Moving to and from a bed to a chair (including a wheelchair)? 3   Need to walk in a hospital room? 3   Climbing 3-5 steps with a railing? 3   6 clicks Mobility Score 21   Activity Tolerance   Sitting in Chair NT   Sitting Edge of Bed 8 min   Standing 7  minutes   Comments pt states nausea/pain is controlled   Short Term Goals    Short Term Goal # 1 Pt will perform sit<>stand with supervision and no device in 6visits to improve functional independence.   Goal Outcome # 1 Goal met   Short Term Goal # 3 Pt will perform supine<>sit with supervision in 6visits.   Goal Outcome # 3 Goal met   Short Term Goal # 4 Pt will ambulate w/o AD for 250ft with supervision to prepare for household/community ambulation.   Goal Outcome # 4 Progressing as expected   Education Group   Role of Physical Therapist Patient Response Patient;Family;Acceptance;Explanation;Demonstration;Verbal Demonstration;Action Demonstration   Anticipated Discharge Equipment and Recommendations   DC Equipment Recommendations Front-Wheel Walker   Discharge Recommendations Recommend home health for continued physical therapy services   Interdisciplinary Plan of Care Collaboration   IDT Collaboration with  Nursing;Family / Caregiver   Patient Position at End of Therapy In Bed;Call Light within Reach;Tray Table within Reach;Phone within Reach;Family / Friend in Room   Collaboration Comments RN aware of session and recs   Session Information   Date / Session Number  5/21 - 2(2/3, 5/25)

## 2023-05-21 NOTE — FACE TO FACE
Face to Face Supporting Documentation - Home Health    The encounter with this patient was in whole or in part the primary reason for home health admission.    Date of encounter:   Patient:                    MRN:                       YOB: 2023  Ifrah Brunson  [id number]  1975     Home health to see patient for:  Skilled Nursing care for assessment, interventions & education and Physical Therapy evaluation and treatment    Skilled need for:  Surgical Aftercare post op    Skilled nursing interventions to include:  Comment: post op    Homebound status evidenced by:  Needs the assistance of another person in order to leave the home. Leaving home requires a considerable and taxing effort. There is a normal inability to leave the home.    Community Physician to provide follow up care: CARLINE [email or web address]     Optional Interventions? No      I certify the face to face encounter for this home health care referral meets the CMS requirements and the encounter/clinical assessment with the patient was, in whole, or in part, for the medical condition(s) listed above, which is the primary reason for home health care. Based on my clinical findings: the service(s) are medically necessary, support the need for home health care, and the homebound criteria are met.  I certify that this patient has had a face to face encounter by myself.  [email or web address] - NPI: [id number]

## 2023-05-21 NOTE — DISCHARGE PLANNING
Case Management Discharge Planning    Admission Date: 5/18/2023  GMLOS: 1.8  ALOS: 3    6-Clicks ADL Score: 21  6-Clicks Mobility Score: 21      Anticipated Discharge Dispo: Discharge Disposition: Discharged to home/self care (01)    DME Needed: No    Action(s) Taken: OTHER    LSW made phone call to patient [phone]. LSW explained home health services and attempted to obtain choice. Per patient, she feels home health is unnecessary as she will have the support of her mom, son, and . Pt declined home health services.    Provided patient with LSW phone number in case she changes her mind.     @0957: Received message patient changed her mind and would like home health services. Call made to pt [phone]. Sent home health choice form to [facility].    Escalations Completed: None    Medically Clear: Yes    Next Steps: LSW to follow up with patient and medical team regarding discharge needs and barriers.     Barriers to Discharge: None

## 2023-06-13 NOTE — DISCHARGE SUMMARY
Discharge Summary    CHIEF COMPLAINT ON ADMISSION  No chief complaint on file.      Reason for Admission  Trigeminal neuralgia     Admission Date  5/18/2023    CODE STATUS  Prior    HPI & HOSPITAL COURSE  This is a 48 y.o. female here with a previous medical history of trigeminal neuralgia of the right side of the face. She was was admitted to the hospital on 5/18/23 and underwent a right retrosigmoid craniotomy for microvascular decompression of CN V with Dr. Echeverria. She tolerated the procedure with no perioperative complications. She is adequately voiding, tolerating their diet, and their pain is controlled. They has been cleared by PT and OT for discharge home with no needs.         Therefore, she is discharged in good and stable condition to home with close outpatient follow-up.    The patient met 2-midnight criteria for an inpatient stay at the time of discharge.    Discharge Date  5/21/2023    FOLLOW UP ITEMS POST DISCHARGE  See dc instructions.    DISCHARGE DIAGNOSES  Principal Problem:    Trigeminal neuralgia of right side of face (POA: Yes)  Active Problems:    S/P craniotomy (POA: Unknown)    Post-surgical hypothyroidism (POA: Yes)      Overview: Dr. Oseas Naranjo (endocrinology)    Anxiety and depression (POA: Yes)    Gastroesophageal reflux disease without esophagitis (POA: Yes)    Leukocytosis (POA: Unknown)    Weakness (POA: Unknown)  Resolved Problems:    * No resolved hospital problems. *      FOLLOW UP  Future Appointments   Date Time Provider Department Center   8/9/2023 10:00 AM Kamari Palomares M.D. [facility] None     Shruthi Echeverria M.D.  [address]  [facility] [address]  [phone]    Follow up      TYREL Chisholm  [address] [address] [id number] [phone]            MEDICATIONS ON DISCHARGE     Medication List        START taking these medications        Instructions   docusate sodium 100 MG Caps  Commonly known as: Colace   Doctor's comments: meds to  beds  Take 1 capsule every day by oral route for 7 days.     ondansetron 4 MG Tabs tablet  Commonly known as: Zofran   Doctor's comments: meds to beds  Take 2 tablets twice a day by oral route as needed for 14 days.     oxyCODONE-acetaminophen 5-325 MG Tabs  Commonly known as: Percocet   Doctor's comments: meds to beds  Take 1 tablet every 6 hours by oral route as needed for 7 days.            CHANGE how you take these medications        Instructions   carBAMazepine  MG Tb12  What changed: See the new instructions.  Commonly known as: TEGRETOL XR   TAKE THREE TABLETS BY MOUTH TWO TIMES A DAY            CONTINUE taking these medications        Instructions   acetaminophen 500 MG Tabs  Commonly known as: TYLENOL   Take 500-1,000 mg by mouth every 6 hours as needed. Indications: Pain  Dose: 500-1,000 mg     buPROPion 150 MG XL tablet  Commonly known as: WELLBUTRIN XL   Take 300 mg by mouth every morning.  Dose: 300 mg     cetirizine 10 MG Tabs  Commonly known as: ZYRTEC   Take 10 mg by mouth every morning.  Dose: 10 mg     dicyclomine 10 MG Caps  Commonly known as: BENTYL   Take 10 mg by mouth every 6 hours as needed (abddomen cramping).  Dose: 10 mg     gabapentin 300 MG Caps  Commonly known as: NEURONTIN   Take 2 Capsules by mouth 3 times a day.  Dose: 600 mg     levothyroxine 175 MCG Tabs  Commonly known as: SYNTHROID   Take 175 mcg by mouth see administration instructions. Hold on Sunday, take all other days  Dose: 175 mcg     liothyronine 5 MCG Tabs  Commonly known as: CYTOMEL   Take 5 mcg by mouth every morning.  Dose: 5 mcg     pantoprazole 40 MG Tbec  Commonly known as: PROTONIX   Take 40 mg by mouth every morning before breakfast.  Dose: 40 mg     traZODone 50 MG Tabs  Commonly known as: DESYREL   Take 50 mg by mouth at bedtime as needed for Sleep.  Dose: 50 mg            STOP taking these medications      B COMPLEX PO     CALCIUM PO     VITAMIN D3 PO              Allergies  Allergies   Allergen  Reactions    Morphine Hives and Itching       DIET  No orders of the defined types were placed in this encounter.      ACTIVITY  As tolerated.      LABORATORY  Lab Results   Component Value Date    SODIUM 139 05/21/2023    POTASSIUM 4.2 05/21/2023    CHLORIDE 105 05/21/2023    CO2 22 05/21/2023    GLUCOSE 111 (H) 05/21/2023    BUN 14 05/21/2023    CREATININE 0.61 05/21/2023    GLOMRATE 83 01/03/2022        Lab Results   Component Value Date    WBC 9.4 05/21/2023    HEMOGLOBIN 13.7 05/21/2023    HEMATOCRIT 42.0 05/21/2023    PLATELETCT 201 05/21/2023        Total time of the discharge process exceeds 35 minutes.

## 2023-07-19 ENCOUNTER — HOSPITAL ENCOUNTER (OUTPATIENT)
Facility: MEDICAL CENTER | Age: 48
End: 2023-07-19
Attending: STUDENT IN AN ORGANIZED HEALTH CARE EDUCATION/TRAINING PROGRAM
Payer: COMMERCIAL

## 2023-07-19 ENCOUNTER — OFFICE VISIT (OUTPATIENT)
Dept: MEDICAL GROUP | Facility: LAB | Age: 48
End: 2023-07-19
Payer: COMMERCIAL

## 2023-07-19 ENCOUNTER — HOSPITAL ENCOUNTER (OUTPATIENT)
Dept: LAB | Facility: MEDICAL CENTER | Age: 48
End: 2023-07-19
Attending: PHYSICIAN ASSISTANT
Payer: COMMERCIAL

## 2023-07-19 VITALS
DIASTOLIC BLOOD PRESSURE: 76 MMHG | RESPIRATION RATE: 14 BRPM | HEIGHT: 66 IN | HEART RATE: 78 BPM | WEIGHT: 245.4 LBS | BODY MASS INDEX: 39.44 KG/M2 | TEMPERATURE: 97.9 F | SYSTOLIC BLOOD PRESSURE: 128 MMHG | OXYGEN SATURATION: 94 %

## 2023-07-19 DIAGNOSIS — K21.9 GASTROESOPHAGEAL REFLUX DISEASE WITHOUT ESOPHAGITIS: ICD-10-CM

## 2023-07-19 DIAGNOSIS — F32.A ANXIETY AND DEPRESSION: ICD-10-CM

## 2023-07-19 DIAGNOSIS — F41.9 ANXIETY AND DEPRESSION: ICD-10-CM

## 2023-07-19 LAB — T4 FREE SERPL-MCNC: 1.27 NG/DL (ref 0.93–1.7)

## 2023-07-19 PROCEDURE — 84439 ASSAY OF FREE THYROXINE: CPT

## 2023-07-19 PROCEDURE — 99214 OFFICE O/P EST MOD 30 MIN: CPT | Performed by: NURSE PRACTITIONER

## 2023-07-19 PROCEDURE — 84443 ASSAY THYROID STIM HORMONE: CPT

## 2023-07-19 PROCEDURE — 3074F SYST BP LT 130 MM HG: CPT | Performed by: NURSE PRACTITIONER

## 2023-07-19 PROCEDURE — 3078F DIAST BP <80 MM HG: CPT | Performed by: NURSE PRACTITIONER

## 2023-07-19 PROCEDURE — 36415 COLL VENOUS BLD VENIPUNCTURE: CPT

## 2023-07-19 RX ORDER — BUPROPION HYDROCHLORIDE 300 MG/1
TABLET ORAL
COMMUNITY
Start: 2023-05-22 | End: 2023-07-19 | Stop reason: SDUPTHER

## 2023-07-19 RX ORDER — PANTOPRAZOLE SODIUM 40 MG/1
40 TABLET, DELAYED RELEASE ORAL DAILY
Qty: 90 TABLET | Refills: 3 | Status: SHIPPED | OUTPATIENT
Start: 2023-07-19

## 2023-07-19 RX ORDER — BUPROPION HYDROCHLORIDE 300 MG/1
300 TABLET ORAL EVERY MORNING
Qty: 90 TABLET | Refills: 3 | Status: SHIPPED | OUTPATIENT
Start: 2023-07-19

## 2023-07-19 ASSESSMENT — FIBROSIS 4 INDEX: FIB4 SCORE: 0.62

## 2023-07-20 LAB — TSH SERPL DL<=0.005 MIU/L-ACNC: 0.38 UIU/ML (ref 0.38–5.33)

## 2023-07-20 NOTE — ASSESSMENT & PLAN NOTE
This is a chronic condition. Patient is currently taking Wellbutrin 300 mg daily with relief, denies side effects.  Denies suicidal or homicidal ideation.  No complaints at this time.

## 2023-07-20 NOTE — ASSESSMENT & PLAN NOTE
This is a chronic condition. Currently taking pantoprazole 40 mg daily with relief. Patient denies heartburn, nausea, vomiting, unintentional weight loss, or fatigue at this time.

## 2023-07-20 NOTE — PROGRESS NOTES
"Subjective:     CC:   Chief Complaint   Patient presents with    Medication Management    Weight Check     Concerns / questions      HPI:   Ifrah presents today with the following:    Anxiety and depression  This is a chronic condition. Patient is currently taking Wellbutrin 300 mg daily with relief, denies side effects.  Denies suicidal or homicidal ideation.  No complaints at this time.    Gastroesophageal reflux disease without esophagitis  This is a chronic condition. Currently taking pantoprazole 40 mg daily with relief. Patient denies heartburn, nausea, vomiting, unintentional weight loss, or fatigue at this time.    ROS:   Gen: no fevers/chills, no changes in weight  Eyes: no changes in vision  ENT: no sore throat, no hearing loss, no bloody nose  Pulm: no sob, no cough  CV: no chest pain, no palpitations  GI: no nausea/vomiting, no diarrhea  : no dysuria  MSk: no myalgias  Skin: no rash  Neuro: no headaches, no numbness/tingling  Heme/Lymph: no easy bruising        - NOTE: All other systems reviewed and are negative, except as in HPI.    Objective:     Exam: /76 (BP Location: Right arm, Patient Position: Sitting, BP Cuff Size: Large adult)   Pulse 78   Temp 36.6 °C (97.9 °F)   Resp 14   Ht 1.676 m (5' 6\")   Wt 111 kg (245 lb 6.4 oz)   SpO2 94%  Body mass index is 39.61 kg/m².    Constitutional: Alert, no distress, well-groomed.  Skin: Warm, dry, good turgor, no rashes in visible areas.  Eye: Equal, round and reactive, conjunctiva clear, lids normal.  ENMT: Lips without lesions, good dentition, moist mucous membranes.  Neck: Trachea midline, no masses, no thyromegaly.  Respiratory: Unlabored respiratory effort, no cough.  MSK: Normal gait, moves all extremities.  Neuro: Grossly non-focal.   Psych: Alert and oriented x3, normal affect and mood.    Assessment & Plan:     48 y.o. female with the following -     1. Anxiety and depression  Patient is feeling well on current medications.  Will " continue.  Denies any suicidal or homicidal ideation. Discussed that should the patient have any symptoms they should call suicide prevention hotline or report to the emergency room immediately. Emphasized importance of healthy diet and exercise.    - buPROPion (WELLBUTRIN XL) 300 MG XL tablet; Take 1 Tablet by mouth every morning.  Dispense: 90 Tablet; Refill: 3    2. Gastroesophageal reflux disease without esophagitis  This is a chronic and stable problem.  Patient is doing well.  No red flags.  Continue PPI and monitor.  - pantoprazole (PROTONIX) 40 MG Tablet Delayed Response; Take 1 Tablet by mouth every day.  Dispense: 90 Tablet; Refill: 3

## 2023-08-29 ENCOUNTER — OFFICE VISIT (OUTPATIENT)
Dept: MEDICAL GROUP | Facility: LAB | Age: 48
End: 2023-08-29
Payer: COMMERCIAL

## 2023-08-29 VITALS
BODY MASS INDEX: 38.57 KG/M2 | SYSTOLIC BLOOD PRESSURE: 118 MMHG | WEIGHT: 240 LBS | TEMPERATURE: 97.7 F | HEIGHT: 66 IN | HEART RATE: 78 BPM | RESPIRATION RATE: 18 BRPM | OXYGEN SATURATION: 97 % | DIASTOLIC BLOOD PRESSURE: 80 MMHG

## 2023-08-29 DIAGNOSIS — E78.5 HYPERLIPIDEMIA, UNSPECIFIED HYPERLIPIDEMIA TYPE: ICD-10-CM

## 2023-08-29 DIAGNOSIS — E66.09 CLASS 2 OBESITY DUE TO EXCESS CALORIES WITHOUT SERIOUS COMORBIDITY WITH BODY MASS INDEX (BMI) OF 38.0 TO 38.9 IN ADULT: ICD-10-CM

## 2023-08-29 DIAGNOSIS — G50.0 TRIGEMINAL NEURALGIA OF RIGHT SIDE OF FACE: ICD-10-CM

## 2023-08-29 DIAGNOSIS — C73 THYROID CANCER (HCC): ICD-10-CM

## 2023-08-29 PROCEDURE — 3079F DIAST BP 80-89 MM HG: CPT | Performed by: FAMILY MEDICINE

## 2023-08-29 PROCEDURE — 99214 OFFICE O/P EST MOD 30 MIN: CPT | Performed by: FAMILY MEDICINE

## 2023-08-29 PROCEDURE — 3074F SYST BP LT 130 MM HG: CPT | Performed by: FAMILY MEDICINE

## 2023-08-29 ASSESSMENT — FIBROSIS 4 INDEX: FIB4 SCORE: 0.62

## 2023-08-30 PROBLEM — E78.5 HYPERLIPIDEMIA: Status: ACTIVE | Noted: 2023-08-30

## 2023-08-30 PROBLEM — E66.812 CLASS 2 OBESITY DUE TO EXCESS CALORIES WITHOUT SERIOUS COMORBIDITY WITH BODY MASS INDEX (BMI) OF 38.0 TO 38.9 IN ADULT: Status: ACTIVE | Noted: 2023-08-30

## 2023-08-30 PROBLEM — E66.09 CLASS 2 OBESITY DUE TO EXCESS CALORIES WITHOUT SERIOUS COMORBIDITY WITH BODY MASS INDEX (BMI) OF 38.0 TO 38.9 IN ADULT: Status: ACTIVE | Noted: 2023-08-30

## 2023-08-30 NOTE — PROGRESS NOTES
Chief Complaint:   Chief Complaint   Patient presents with    Weight Check     Initial       HPI:Established , New to me here for initial weight management consultation   Ifrah Brunson is a 48 y.o. female who presents for     1. Class 2 obesity due to excess calories without serious comorbidity with body mass index (BMI) of 38.0 to 38.9 in adult  History of present illness:  Reviewed with her today the following:    Goal: Discussed with the patient BMI below 27 for her last health risk factors, patient would like to have a weight around 165.        Diet: No specific dietary restrictions  Exercise and physical activity: Exercising regularly almost 4 to 5 days/week including aerobics and weightlifting resistance exercise    Fluids intake and water: Patient claims to drink large amount of water    Medications: Discussed with the patient medications including gabapentin, patient on tapering dose, discussed that this medication can cause weight gain patient at 600 mg daily at this time we will be tapering off the medication upcoming weeks      Previous trials for weight loss: Weight watchers    Behavioral/emotional history:Challenges and barriers to weight loss:  Denies emotional or behavioral challenges    Lab review: Reviewed, patient with elevated LDL, normal A1c, normal thyroid function,     2. Hyperlipidemia, unspecified hyperlipidemia type    chronic ongoing elevated LDL discussed evaluation of cardiovascular risk    3. Trigeminal neuralgia of right side of face  Status post recent craniotomy, patient on gabapentin.  She states she has been tapering gabapentin gradually and off the medication, she is at 100 mg at this time 600     4. Thyroid cancer     history of thyroid cancer status post thyroidectomy.  Discussed with patient she is not a good candidate for GLP-1 agonist that I use for weight loss because of the history of thyroid cancer since the medication can cause increased          Past  medical history, family history, social history and medications reviewed and updated in the record. today  Current medications, problem list and allergies reviewed in Epic today  Health maintenance topics are reviewed and updated.    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    Gastroesophageal reflux disease without esophagitis 10/11/2022    Thyroid cancer (HCC) 11/17/2020    Rib pain on right side 07/11/2014    Migraines 01/16/2014    Anxiety and depression 01/16/2014    Post-surgical hypothyroidism      Family History   Problem Relation Age of Onset    Hyperlipidemia Mother     Heart Disease Father     Hyperlipidemia Father     Psychiatric Illness Father         depression    Diabetes Father         Type 2    Arterial Aneurysm Father         Thoracic and abdominal aortic    Cancer Maternal Uncle         Adrenal Cancer    Cancer Maternal Grandmother         Breast Cancer/Lung Cancer    Breast Cancer Maternal Grandmother     Heart Disease Maternal Grandfather     Hypertension Maternal Grandfather     Hyperlipidemia Maternal Grandfather     Diabetes Paternal Grandmother         Type 2    Heart Disease Paternal Grandfather     Hypertension Paternal Grandfather     Hyperlipidemia Paternal Grandfather     Cancer Paternal Uncle         Colon Cancer    Colorectal Cancer Paternal Uncle     Stroke Neg Hx      Social History     Socioeconomic History    Marital status:      Spouse name: Not on file    Number of children: Not on file    Years of education: Not on file    Highest education level: Not on file   Occupational History     Comment: [employer]     Tobacco Use     Smoking status: Former     Current packs/day: 0.00     Types: Cigarettes     Quit date: 2009     Years since quittin.6    Smokeless tobacco: Never    Tobacco comments:     Social smoker   Vaping Use    Vaping Use: Never used   Substance and Sexual Activity    Alcohol use: Yes     Alcohol/week: 1.2 oz     Types: 1 Glasses of wine, 1 Cans of beer per week     Comment: Occasional drinker, especially since started medications    Drug use: No    Sexual activity: Not on file     Comment: singe   Other Topics Concern    Not on file   Social History Narrative    Not on file     Social Determinants of Health     Financial Resource Strain: Not on file   Food Insecurity: Not on file   Transportation Needs: Not on file   Physical Activity: Not on file   Stress: Not on file   Social Connections: Not on file   Intimate Partner Violence: Not on file   Housing Stability: Not on file       Current Outpatient Medications   Medication Sig Dispense Refill    buPROPion (WELLBUTRIN XL) 300 MG XL tablet Take 1 Tablet by mouth every morning. 90 Tablet 3    pantoprazole (PROTONIX) 40 MG Tablet Delayed Response Take 1 Tablet by mouth every day. 90 Tablet 3    traZODone (DESYREL) 50 MG Tab Take 50 mg by mouth at bedtime as needed for Sleep.      carBAMazepine SR (TEGRETOL XR) 100 MG TABLET SR 12 HR TAKE THREE TABLETS BY MOUTH TWO TIMES A DAY (Patient taking differently: Take 300 mg by mouth 2 times a day.) 180 Tablet 5    gabapentin (NEURONTIN) 300 MG Cap Take 2 Capsules by mouth 3 times a day. 180 Capsule 5    liothyronine (CYTOMEL) 5 MCG Tab Take 5 mcg by mouth every morning.      levothyroxine (SYNTHROID) 175 MCG Tab Take 175 mcg by mouth see administration instructions. Hold on , take all other days      dicyclomine (BENTYL) 10 MG Cap Take 10 mg by mouth every 6 hours as needed (abddomen cramping).      cetirizine (ZYRTEC) 10 MG Tab Take 10 mg by mouth every morning.       No current facility-administered medications  "for this visit.         Review Of Systems  As documented in HPI above  PHYSICAL EXAMINATION:    /80 (BP Location: Left arm, Patient Position: Sitting, BP Cuff Size: Adult)   Pulse 78   Temp 36.5 °C (97.7 °F) (Temporal)   Resp 18   Ht 1.676 m (5' 6\")   Wt 109 kg (240 lb)   SpO2 97%   BMI 38.74 kg/m²   Gen.: Well-developed, well-nourished, no apparent distress, pleasant and cooperative with the examination  HEENT: Normocephalic/atraumatic,   Neck: No JVD or bruits, no adenopathy  Cor: Regular rate and rhythm without murmur gallop or rub  Lungs: Clear to auscultation with equal breath sounds bilaterally. No wheezes, rhonchi.  Abdomen: Soft nontender without hepatosplenomegaly or masses appreciated, normoactive bowel sounds  Extremities: No cyanosis, clubbing or edema       ASSESSMENT/Plan:  1. Class 2 obesity due to excess calories without serious comorbidity with body mass index (BMI) of 38.0 to 38.9 in adult  New patient for initial weight management visit   Discussed the following plan :  Diet: After calculating patient BMR which is around 1700 advised to cut back  on calories  to creat calory deficiency , patient to take 1500 calories per day for now , goal to reach 1200 calories per day   Protein around 60 to 70 g/day as minimum , take up to 120 gm per day   Diet mainly consistent of high-protein, low carb, low fiber, low fat.  Patient to take 3 meals and 1 snack per day and she can increase it to 2 snacks if she is feeling hungry.  Mainly the breakfast and lunch would be meal substitute, patient preferred to use protein bar and protein shake, discussed with the patient the criteria to choose her protein shakes or bars for calorie protein ratio as 10:1.  Fluids intake will advise water around 80 ounces per day  Avoid juices and sodas, allowed for diet soda occasionally, no artificial sweeteners  Exercise   4-5 times a week 30 minutes of walk will increase gradually, no barriers for exercise activity "     Medications :  Avoid GLP1 agonist for History of Thyroid cancer       Taper off Gabapentin which caused weight gain       2. Hyperlipidemia, unspecified hyperlipidemia type  Ordered ct calcium score to assess cardiovascular risk , continue with weight management       3. Trigeminal neuralgia of right side of face   Pain is well Controlled after recent craniotomy procedure   , tapering off Gabapentin       4. Thyroid cancer (HCC)  S/P Thyroidectomy , continue Synthroid , avoid GlP1 agonist medications like Ozempic and weguvy       Please note that this dictation was created using voice recognition software. I have worked with consultants from the vendor as well as technical experts from [employer] [facility] [employer] to optimize the interface. I have made every reasonable attempt to correct obvious errors, but I expect that there are errors of grammar and possibly content that I did not discover before finalizing the note.

## 2023-09-05 ENCOUNTER — OFFICE VISIT (OUTPATIENT)
Dept: NEUROLOGY | Facility: MEDICAL CENTER | Age: 48
End: 2023-09-05
Attending: PSYCHIATRY & NEUROLOGY
Payer: COMMERCIAL

## 2023-09-05 VITALS
DIASTOLIC BLOOD PRESSURE: 74 MMHG | HEART RATE: 73 BPM | OXYGEN SATURATION: 96 % | HEIGHT: 66 IN | SYSTOLIC BLOOD PRESSURE: 112 MMHG | TEMPERATURE: 98.1 F | BODY MASS INDEX: 38.27 KG/M2 | WEIGHT: 238.1 LBS

## 2023-09-05 DIAGNOSIS — G50.0 TRIGEMINAL NEURALGIA OF RIGHT SIDE OF FACE: ICD-10-CM

## 2023-09-05 PROCEDURE — 99214 OFFICE O/P EST MOD 30 MIN: CPT | Performed by: PSYCHIATRY & NEUROLOGY

## 2023-09-05 PROCEDURE — 3074F SYST BP LT 130 MM HG: CPT | Performed by: PSYCHIATRY & NEUROLOGY

## 2023-09-05 PROCEDURE — 3078F DIAST BP <80 MM HG: CPT | Performed by: PSYCHIATRY & NEUROLOGY

## 2023-09-05 PROCEDURE — 99212 OFFICE O/P EST SF 10 MIN: CPT | Performed by: PSYCHIATRY & NEUROLOGY

## 2023-09-05 ASSESSMENT — ENCOUNTER SYMPTOMS
TINGLING: 0
FOCAL WEAKNESS: 0
SENSORY CHANGE: 1
MEMORY LOSS: 0

## 2023-09-05 ASSESSMENT — FIBROSIS 4 INDEX: FIB4 SCORE: 0.62

## 2023-09-05 NOTE — PROGRESS NOTES
"Subjective     Ifrah Marlene Brunson is a 48 y.o. female who presents for follow-up, with a history of a right-sided refractory trigeminal neuralgiform pain syndrome, now status post microvascular decompression with near full pain relief.     THRO Rg states that her pain symptoms have responded almost in the miraculous fashion since her decompressive surgery this last May.  She had seen Dr. Shruthi Echeverria MD, at [facility] back in March, the over read on the MRI of the brain from December of last year did reveal the possibility of a compressive arterial lesion.  Discussions were held with both ablative as well as decompressive procedures, given her age and preservation of renal function, the vascular decompressive procedure was opted.    Intraoperative observation revealed a significant venous compressive lesion.  Following microvascular intervention, she has become essentially pain-free.  She is now off Tegretol, slowly titrating off gabapentin, she will be off the 300 mg daily dosing within 1 week.  The side effects of cognitive impairment, dizziness, etc. have all resolved.  She is talking, chewing, etc. without issue.  None of the skin hypersensitivity is there.  She still has some numbness on the right side of the tongue and face, this is improving slowly and steadily.    Medical, surgical and family histories are reviewed, there are no new drug allergies.  She is on gabapentin 300 mg daily, Protonix, Wellbutrin, trazodone, Cytomel, Synthroid, Bentyl and Zyrtec.    Review of Systems   Neurological:  Positive for sensory change. Negative for tingling and focal weakness.   Psychiatric/Behavioral:  Negative for memory loss.    All other systems reviewed and are negative.    Objective     /74 (BP Location: Right arm, Patient Position: Sitting, BP Cuff Size: Large adult)   Pulse 73   Temp 36.7 °C (98.1 °F) (Temporal)   Ht 1.676 m (5' 6\")   Wt 108 kg (238 lb 1.6 oz)   SpO2 96%   " BMI 38.43 kg/m²      Physical Exam    She appears in no acute distress.  Vital signs are stable.  There is no malar rash or jaw claudication, Chvostek sign on the right is absent.  The neck is supple, range of motion is full.  Cardiac evaluation reveals a regular rhythm.     Neurological Exam    Fully oriented, there is no aphasia or inattention.    PERRLA/EOMI, visual fields are grossly full, facial movements are symmetric.  Sensory exam is intact to temperature bilaterally.  The tongue and uvula are midline, lateral tongue movements are intact, jaw movements as well.  There is no bulbar dysfunction.  Shoulder shrug and head rotation are normal.    Musculoskeletal exam reveals normal tone without tremor or drift.  Strength is intact throughout.  She moves all 4 extremities symmetrically.  Reflex exam was deferred.    There is no appendicular dystaxia with the upper extremities, fine motor control and repetitive movements with the hands and fingers are also normal.  To observation she was seen to stand and walk with normal station, symmetric arm swing and maintained stride length.    Sensory exam was deferred.    Assessment & Plan     1. Trigeminal neuralgia of right side of face  She has done incredibly well, I would anticipate a full recovery of the minimal deficits she has postoperatively.  She has no motor weakness, and a subjective perception on the right side of the cheek of diminished sensory input.  She will be able to get off gabapentin, hopefully completely and for the long-term.  She will be following up with Dr. Echeverria in another month.  For now there is no need for follow-up from my standpoint.    Time: 30 minutes in total spent on patient care including pre-charting, record review, discussion with healthcare staff and documentation.  This includes face-to-face time for exam, review, discussion, as well as counseling and coordinating care.

## 2023-09-21 ENCOUNTER — OFFICE VISIT (OUTPATIENT)
Dept: MEDICAL GROUP | Facility: LAB | Age: 48
End: 2023-09-21
Payer: COMMERCIAL

## 2023-09-21 VITALS
OXYGEN SATURATION: 97 % | SYSTOLIC BLOOD PRESSURE: 110 MMHG | WEIGHT: 236 LBS | TEMPERATURE: 98.2 F | BODY MASS INDEX: 37.93 KG/M2 | HEART RATE: 72 BPM | RESPIRATION RATE: 18 BRPM | HEIGHT: 66 IN | DIASTOLIC BLOOD PRESSURE: 78 MMHG

## 2023-09-21 DIAGNOSIS — E66.09 CLASS 2 OBESITY DUE TO EXCESS CALORIES WITHOUT SERIOUS COMORBIDITY WITH BODY MASS INDEX (BMI) OF 38.0 TO 38.9 IN ADULT: ICD-10-CM

## 2023-09-21 PROCEDURE — 3074F SYST BP LT 130 MM HG: CPT | Performed by: FAMILY MEDICINE

## 2023-09-21 PROCEDURE — 3078F DIAST BP <80 MM HG: CPT | Performed by: FAMILY MEDICINE

## 2023-09-21 PROCEDURE — 99213 OFFICE O/P EST LOW 20 MIN: CPT | Performed by: FAMILY MEDICINE

## 2023-09-21 ASSESSMENT — FIBROSIS 4 INDEX: FIB4 SCORE: 0.62

## 2023-09-21 NOTE — PROGRESS NOTES
Chief Complaint:   Chief Complaint   Patient presents with    Weight Check       HPI: Established patient  Ifrah Brunson is a 48 y.o. female who presents for weight management        1. Class 2 obesity   Follow-up after first initial visit, lost 4 pounds.  Continues with high-protein diet calorie deficiency at 1500 bashir/day, continues to exercise regularly although she stopped about 1 week ago because her  was sick., patient said she still needs to work on her water intake.  Less than 64 ounces per day.      Past medical history, family history, social history and medications reviewed and updated in the record.  Today  Current medications, problem list and allergies reviewed in [facility] today  Health maintenance topics are reviewed and updated.    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    Gastroesophageal reflux disease without esophagitis 10/11/2022    Thyroid cancer (HCC) 11/17/2020    Rib pain on right side 07/11/2014    Migraines 01/16/2014    Anxiety and depression 01/16/2014    Post-surgical hypothyroidism      Family History   Problem Relation Age of Onset    Hyperlipidemia Mother     Heart Disease Father     Hyperlipidemia Father     Psychiatric Illness Father         depression    Diabetes Father         Type 2    Arterial Aneurysm Father         Thoracic and abdominal aortic    Cancer Maternal Uncle         Adrenal Cancer    Cancer Maternal Grandmother         Breast Cancer/Lung Cancer    Breast Cancer Maternal Grandmother     Heart Disease Maternal Grandfather     Hypertension Maternal Grandfather     Hyperlipidemia Maternal Grandfather     Diabetes  Paternal Grandmother         Type 2    Heart Disease Paternal Grandfather     Hypertension Paternal Grandfather     Hyperlipidemia Paternal Grandfather     Cancer Paternal Uncle         Colon Cancer    Colorectal Cancer Paternal Uncle     Stroke Neg Hx      Social History     Socioeconomic History    Marital status:      Spouse name: Not on file    Number of children: Not on file    Years of education: Not on file    Highest education level: Not on file   Occupational History     Comment: [employer]     Tobacco Use    Smoking status: Former     Current packs/day: 0.00     Types: Cigarettes     Quit date: 2009     Years since quittin.6    Smokeless tobacco: Never    Tobacco comments:     Social smoker   Vaping Use    Vaping Use: Never used   Substance and Sexual Activity    Alcohol use: Yes     Alcohol/week: 1.2 oz     Types: 1 Glasses of wine, 1 Cans of beer per week     Comment: Occasional drinker, especially since started medications    Drug use: No    Sexual activity: Not on file     Comment: singe   Other Topics Concern    Not on file   Social History Narrative    Not on file     Social Determinants of Health     Financial Resource Strain: Not on file   Food Insecurity: Not on file   Transportation Needs: Not on file   Physical Activity: Not on file   Stress: Not on file   Social Connections: Not on file   Intimate Partner Violence: Not on file   Housing Stability: Not on file     Current Outpatient Medications   Medication Sig Dispense Refill    buPROPion (WELLBUTRIN XL) 300 MG XL tablet Take 1 Tablet by mouth every morning. 90 Tablet 3    pantoprazole (PROTONIX) 40 MG Tablet Delayed Response Take 1 Tablet by mouth every day. 90 Tablet 3    traZODone (DESYREL) 50 MG Tab Take 50 mg by mouth at bedtime as needed for Sleep.      liothyronine (CYTOMEL) 5 MCG Tab Take 5 mcg by mouth every morning.      levothyroxine (SYNTHROID) 175 MCG Tab Take 175 mcg by mouth see administration  "instructions. Hold on Sunday, take all other days      dicyclomine (BENTYL) 10 MG Cap Take 10 mg by mouth every 6 hours as needed (abddomen cramping).      cetirizine (ZYRTEC) 10 MG Tab Take 10 mg by mouth every morning.       No current facility-administered medications for this visit.           Review Of Systems  As documented in HPI above  PHYSICAL EXAMINATION:    /78 (BP Location: Right arm, Patient Position: Sitting, BP Cuff Size: Adult)   Pulse 72   Temp 36.8 °C (98.2 °F) (Temporal)   Resp 18   Ht 1.676 m (5' 6\")   Wt 107 kg (236 lb)   SpO2 97%   BMI 38.09 kg/m²   Gen.: Well-developed, well-nourished, no apparent distress, pleasant and cooperative with the examination  HEENT: Normocephalic/atraumatic,     Neck: No JVD or bruits, no adenopathy  Cor: Regular rate and rhythm without murmur gallop or rub    Extremities: No cyanosis, clubbing or edema       ASSESSMENT/Plan:  1. Class 2 obesity due to excess calories without serious comorbidity with body mass index (BMI) of 38.0 to 38.9 in adult  Chronic, improved by losing 4 pounds since last visit, encouraged to continue high-protein diet calorie deficiency high water intake and exercise, follow-up regularly for weight check.  If any consideration of medication for weight loss would be phentermine or Wellbutrin, will avoid GLP-1 agonist because of history of thyroid cancer.           "

## 2023-10-19 ENCOUNTER — OFFICE VISIT (OUTPATIENT)
Dept: MEDICAL GROUP | Facility: LAB | Age: 48
End: 2023-10-19
Payer: COMMERCIAL

## 2023-10-19 VITALS
DIASTOLIC BLOOD PRESSURE: 80 MMHG | SYSTOLIC BLOOD PRESSURE: 130 MMHG | HEART RATE: 72 BPM | BODY MASS INDEX: 37.28 KG/M2 | OXYGEN SATURATION: 97 % | HEIGHT: 66 IN | WEIGHT: 232 LBS | RESPIRATION RATE: 16 BRPM | TEMPERATURE: 98.2 F

## 2023-10-19 DIAGNOSIS — R53.83 OTHER FATIGUE: ICD-10-CM

## 2023-10-19 DIAGNOSIS — R35.0 URINARY FREQUENCY: ICD-10-CM

## 2023-10-19 DIAGNOSIS — F32.A ANXIETY AND DEPRESSION: ICD-10-CM

## 2023-10-19 DIAGNOSIS — F41.9 ANXIETY AND DEPRESSION: ICD-10-CM

## 2023-10-19 DIAGNOSIS — E66.09 CLASS 2 OBESITY DUE TO EXCESS CALORIES WITHOUT SERIOUS COMORBIDITY WITH BODY MASS INDEX (BMI) OF 38.0 TO 38.9 IN ADULT: ICD-10-CM

## 2023-10-19 PROCEDURE — 3079F DIAST BP 80-89 MM HG: CPT | Performed by: FAMILY MEDICINE

## 2023-10-19 PROCEDURE — 99214 OFFICE O/P EST MOD 30 MIN: CPT | Performed by: FAMILY MEDICINE

## 2023-10-19 PROCEDURE — 3075F SYST BP GE 130 - 139MM HG: CPT | Performed by: FAMILY MEDICINE

## 2023-10-19 RX ORDER — CEPHALEXIN 500 MG/1
CAPSULE ORAL
COMMUNITY
Start: 2023-09-20 | End: 2023-10-19

## 2023-10-19 ASSESSMENT — FIBROSIS 4 INDEX: FIB4 SCORE: 0.62

## 2023-10-19 NOTE — PROGRESS NOTES
Chief Complaint:   Chief Complaint   Patient presents with    Weight Check       HPI:Established patient   Ifrah Brunson is a 48 y.o. female who presents for follow-up to check weight, concerns about fatigue, discussed the following today:    1. Other fatigue  New/acute.  Reports fatigue and tiredness for the past 2 weeks, denies depressed bedrock symptoms flulike symptoms abdominal pain or other symptoms except for urine frequency that she thinks it might be related to her multiple fibroids and pressure sensation.  Denies fever or other symptoms.  Medications doses did not change.  Also reports hot flashes and insomnia and inability to sleep at night    2. Anxiety and depression  Patient denies symptoms of uncontrolled depression or anxiety she is on high-dose Wellbutrin to control her symptoms and trazodone to use it as needed    3. Class 2 obesity   Patient lost around 10 to 11 pounds since the beginning of the program, continues with high protein diet calorie deficiency exercise, trying to reach 64 ounces of water daily and she is working on it to her goal of 64 ounces.  To get there.    4. Urinary frequency    Reports continues increased urine frequency with pressure-like sensation that she relates to possibly her uterine fibroids      Past medical history, family history, social history and medications reviewed and updated in the record.  Today  Current medications, problem list and allergies reviewed in [facility] today  Health maintenance topics are reviewed and updated.    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    Gastroesophageal reflux disease without  esophagitis 10/11/2022    Thyroid cancer (HCC) 2020    Rib pain on right side 2014    Migraines 2014    Anxiety and depression 2014    Post-surgical hypothyroidism      Family History   Problem Relation Age of Onset    Hyperlipidemia Mother     Heart Disease Father     Hyperlipidemia Father     Psychiatric Illness Father         depression    Diabetes Father         Type 2    Arterial Aneurysm Father         Thoracic and abdominal aortic    Cancer Maternal Uncle         Adrenal Cancer    Cancer Maternal Grandmother         Breast Cancer/Lung Cancer    Breast Cancer Maternal Grandmother     Heart Disease Maternal Grandfather     Hypertension Maternal Grandfather     Hyperlipidemia Maternal Grandfather     Diabetes Paternal Grandmother         Type 2    Heart Disease Paternal Grandfather     Hypertension Paternal Grandfather     Hyperlipidemia Paternal Grandfather     Cancer Paternal Uncle         Colon Cancer    Colorectal Cancer Paternal Uncle     Stroke Neg Hx      Social History     Socioeconomic History    Marital status:      Spouse name: Not on file    Number of children: Not on file    Years of education: Not on file    Highest education level: Not on file   Occupational History     Comment: [employer]     Tobacco Use    Smoking status: Former     Current packs/day: 0.00     Types: Cigarettes     Quit date: 2009     Years since quittin.7    Smokeless tobacco: Never    Tobacco comments:     Social smoker   Vaping Use    Vaping Use: Never used   Substance and Sexual Activity    Alcohol use: Yes     Alcohol/week: 1.2 oz     Types: 1 Glasses of wine, 1 Cans of beer per week     Comment: Occasional drinker, especially since started medications    Drug use: No    Sexual activity: Not on file     Comment: singe   Other Topics Concern    Not on file   Social History Narrative    Not on file     Social Determinants of Health     Financial Resource Strain: Not  "on file   Food Insecurity: Not on file   Transportation Needs: Not on file   Physical Activity: Not on file   Stress: Not on file   Social Connections: Not on file   Intimate Partner Violence: Not on file   Housing Stability: Not on file       Current Outpatient Medications   Medication Sig Dispense Refill    buPROPion (WELLBUTRIN XL) 300 MG XL tablet Take 1 Tablet by mouth every morning. 90 Tablet 3    pantoprazole (PROTONIX) 40 MG Tablet Delayed Response Take 1 Tablet by mouth every day. 90 Tablet 3    traZODone (DESYREL) 50 MG Tab Take 50 mg by mouth at bedtime as needed for Sleep.      liothyronine (CYTOMEL) 5 MCG Tab Take 5 mcg by mouth every morning.      levothyroxine (SYNTHROID) 175 MCG Tab Take 175 mcg by mouth see administration instructions. Hold on Sunday, take all other days      dicyclomine (BENTYL) 10 MG Cap Take 10 mg by mouth every 6 hours as needed (abddomen cramping).      cetirizine (ZYRTEC) 10 MG Tab Take 10 mg by mouth every morning.       No current facility-administered medications for this visit.         Review Of Systems  As documented in HPI above  PHYSICAL EXAMINATION:    /80 (BP Location: Right arm, Patient Position: Sitting, BP Cuff Size: Adult)   Pulse 72   Temp 36.8 °C (98.2 °F) (Temporal)   Resp 16   Ht 1.676 m (5' 6\")   Wt 105 kg (232 lb)   SpO2 97%   BMI 37.45 kg/m²     Gen.: Well-developed, well-nourished, no apparent distress, pleasant and cooperative with the examination  HEENT: Normocephalic/atraumatic,     Neck: No JVD or bruits, no adenopathy  Cor: Regular rate and rhythm without murmur gallop or rub  Lungs: Clear to auscultation with equal breath sounds bilaterally. No wheezes, rhonchi.  Abdomen: Soft nontender without hepatosplenomegaly or masses appreciated, normoactive bowel sounds  Extremities: No cyanosis, clubbing or edema       ASSESSMENT/Plan:    1. Other fatigue  New/acute concern, for the past 2 weeks.  Unclear etiology, advised to check her thyroid " function and do labs for further evaluation, rule out UTI chronic stable on Wellbutrin continue current regimen  CBC WITH DIFFERENTIAL    TSH WITH REFLEX TO FT4    Comp Metabolic Panel    URINALYSIS,CULTURE IF INDICATED      2. Anxiety and depression        3. Class 2 obesity due to excess calories without serious comorbidity with body mass index (BMI) of 38.0 to 38.9 in adult  Chronic, better control patient continues to lose weight.  Advised to continue current plan      4. Urinary frequency  URINALYSIS,CULTURE IF INDICATED          Please note that this dictation was created using voice recognition software. I have made every reasonable attempt to correct obvious errors but there may be errors of grammar and content that I may have overlooked prior to finalization of this note.

## 2023-11-04 ENCOUNTER — OFFICE VISIT (OUTPATIENT)
Dept: URGENT CARE | Facility: CLINIC | Age: 48
End: 2023-11-04
Payer: COMMERCIAL

## 2023-11-04 VITALS
HEART RATE: 75 BPM | OXYGEN SATURATION: 94 % | BODY MASS INDEX: 37.45 KG/M2 | RESPIRATION RATE: 17 BRPM | HEIGHT: 66 IN | TEMPERATURE: 98.7 F | SYSTOLIC BLOOD PRESSURE: 126 MMHG | DIASTOLIC BLOOD PRESSURE: 78 MMHG

## 2023-11-04 DIAGNOSIS — R10.84 ACUTE GENERALIZED ABDOMINAL PAIN: ICD-10-CM

## 2023-11-04 DIAGNOSIS — R19.7 DIARRHEA, UNSPECIFIED TYPE: ICD-10-CM

## 2023-11-04 PROCEDURE — 3078F DIAST BP <80 MM HG: CPT

## 2023-11-04 PROCEDURE — 99213 OFFICE O/P EST LOW 20 MIN: CPT

## 2023-11-04 PROCEDURE — 3074F SYST BP LT 130 MM HG: CPT

## 2023-11-04 RX ORDER — LOPERAMIDE HYDROCHLORIDE 2 MG/1
2 CAPSULE ORAL 4 TIMES DAILY PRN
Qty: 15 CAPSULE | Refills: 0 | Status: SHIPPED | OUTPATIENT
Start: 2023-11-04

## 2023-11-04 ASSESSMENT — ENCOUNTER SYMPTOMS
ANOREXIA: 0
WEIGHT LOSS: 0
HEMATOCHEZIA: 0
ARTHRALGIAS: 0
FEVER: 0
ABDOMINAL PAIN: 1
CONSTIPATION: 0
FLATUS: 0
MYALGIAS: 0
NAUSEA: 1
HEADACHES: 1
VOMITING: 0
DIARRHEA: 1
BELCHING: 0

## 2023-11-04 ASSESSMENT — CROHNS DISEASE ACTIVITY INDEX (CDAI): CDAI SCORE: 0

## 2023-11-04 NOTE — PROGRESS NOTES
Subjective:   Ifrah Brunson is a very pleasant 48 y.o. female who presents for:    Chief Complaint   Patient presents with    Other     Intermittent stomach pain        HPI:     Abdominal Pain  This is a new problem. Episode onset: four days ago. The onset quality is gradual. The problem occurs intermittently. Duration: cramping lasts 5-10 minutes. The pain is located in the generalized abdominal region. The quality of the pain is a sensation of fullness and cramping. The abdominal pain does not radiate. Associated symptoms include diarrhea, headaches and nausea. Pertinent negatives include no anorexia, arthralgias, belching, constipation, fever, flatus, frequency, hematochezia, hematuria, melena, myalgias, vomiting or weight loss. Associated symptoms comments: Fullness of abdomen, diarrhea after eating, nausea . The pain is relieved by Being still (diarrhea). She has tried antacids and proton pump inhibitors (Bentyl, which helps with cramping) for the symptoms. The treatment provided mild relief. Prior diagnostic workup includes upper endoscopy. Her past medical history is significant for abdominal surgery and GERD. There is no history of colon cancer, Crohn's disease, gallstones, irritable bowel syndrome, pancreatitis, PUD or ulcerative colitis. bile reflux, hiatal hernia, gallbladder removed in 2010        ROS:    Review of Systems   Constitutional:  Negative for fever and weight loss.   Gastrointestinal:  Positive for abdominal pain, diarrhea and nausea. Negative for anorexia, constipation, flatus, hematochezia, melena and vomiting.   Genitourinary:  Negative for frequency and hematuria.   Musculoskeletal:  Negative for arthralgias and myalgias.   Neurological:  Positive for headaches.       Medications:      Current Outpatient Medications   Medication Sig    loperamide (IMODIUM) 2 MG Cap Take 1 Capsule by mouth 4 times a day as needed for Diarrhea.    buPROPion (WELLBUTRIN XL) 300 MG XL  tablet Take 1 Tablet by mouth every morning.    pantoprazole (PROTONIX) 40 MG Tablet Delayed Response Take 1 Tablet by mouth every day.    traZODone (DESYREL) 50 MG Tab Take 50 mg by mouth at bedtime as needed for Sleep.    liothyronine (CYTOMEL) 5 MCG Tab Take 5 mcg by mouth every morning.    levothyroxine (SYNTHROID) 175 MCG Tab Take 175 mcg by mouth see administration instructions. Hold on , take all other days    dicyclomine (BENTYL) 10 MG Cap Take 10 mg by mouth every 6 hours as needed (abddomen cramping).    cetirizine (ZYRTEC) 10 MG Tab Take 10 mg by mouth every morning.       Allergies:     Allergies   Allergen Reactions    Morphine Hives and Itching       Problem List:     Patient Active Problem List   Diagnosis    Post-surgical hypothyroidism    Migraines    Anxiety and depression    Rib pain on right side    Thyroid cancer (HCC)    TMJ arthralgia    Gastroesophageal reflux disease without esophagitis    Trigeminal neuralgia of right side of face    S/P craniotomy    Leukocytosis    Weakness    Class 2 obesity due to excess calories without serious comorbidity with body mass index (BMI) of 38.0 to 38.9 in adult    Hyperlipidemia       Surgical History:    Past Surgical History:   Procedure Laterality Date    CRANIOTOMY Right 2023    Procedure: RIGHT CRANIOTOMY FOR MICROVASCULAR DECOMPRESSION;  Surgeon: Shruthi Echeverria M.D.;  Location: SURGERY [facility];  Service: Neurosurgery    CHOLECYSTECTOMY  2009    GYN SURGERY  2016    Endometrial ablation    OTHER  2012    Complete thyroidectomy & left neck dissection       Past Social Hx:     Social History     Socioeconomic History    Marital status:    Occupational History     Comment: [employer]     Tobacco Use    Smoking status: Former     Current packs/day: 0.00     Types: Cigarettes     Quit date: 2009     Years since quittin.8    Smokeless tobacco: Never    Tobacco comments:     Social smoker   Vaping  Use    Vaping Use: Never used   Substance and Sexual Activity    Alcohol use: Yes     Alcohol/week: 1.2 oz     Types: 1 Glasses of wine, 1 Cans of beer per week     Comment: Occasional drinker, especially since started medications    Drug use: No        Past Family Hx:      Family History   Problem Relation Age of Onset    Hyperlipidemia Mother     Heart Disease Father     Hyperlipidemia Father     Psychiatric Illness Father         depression    Diabetes Father         Type 2    Arterial Aneurysm Father         Thoracic and abdominal aortic    Cancer Maternal Uncle         Adrenal Cancer    Cancer Maternal Grandmother         Breast Cancer/Lung Cancer    Breast Cancer Maternal Grandmother     Heart Disease Maternal Grandfather     Hypertension Maternal Grandfather     Hyperlipidemia Maternal Grandfather     Diabetes Paternal Grandmother         Type 2    Heart Disease Paternal Grandfather     Hypertension Paternal Grandfather     Hyperlipidemia Paternal Grandfather     Cancer Paternal Uncle         Colon Cancer    Colorectal Cancer Paternal Uncle     Stroke Neg Hx        Problem list, medications, and allergies reviewed by myself today in Epic.     Objective:     Vitals:    11/04/23 1628   BP: 126/78   Pulse: 75   Resp: 17   Temp: 37.1 °C (98.7 °F)   SpO2: 94%       Physical Exam  Vitals reviewed.   Constitutional:       General: She is not in acute distress.     Appearance: Normal appearance. She is not ill-appearing, toxic-appearing or diaphoretic.   HENT:      Head: Normocephalic and atraumatic.      Right Ear: External ear normal.      Left Ear: External ear normal.      Nose: Nose normal.      Mouth/Throat:      Mouth: Mucous membranes are moist.   Eyes:      Extraocular Movements: Extraocular movements intact.      Conjunctiva/sclera: Conjunctivae normal.      Pupils: Pupils are equal, round, and reactive to light.   Cardiovascular:      Rate and Rhythm: Normal rate and regular rhythm.   Pulmonary:       Effort: Pulmonary effort is normal.   Abdominal:      Tenderness: There is abdominal tenderness in the epigastric area. There is no right CVA tenderness, left CVA tenderness, guarding or rebound. Negative signs include Lizarraga's sign, Rovsing's sign, McBurney's sign, psoas sign and obturator sign.          Comments: Mild tenderness to palpation over epigastric area. No acute peritoneal signs.   Musculoskeletal:         General: Normal range of motion.      Cervical back: Normal range of motion.   Skin:     General: Skin is warm and dry.   Neurological:      General: No focal deficit present.      Mental Status: She is alert and oriented to person, place, and time.   Psychiatric:         Mood and Affect: Mood normal.         Behavior: Behavior normal.         Thought Content: Thought content normal.               Assessment/Plan:     Diagnosis and associated orders:     1. Diarrhea, unspecified type  - loperamide (IMODIUM) 2 MG Cap; Take 1 Capsule by mouth 4 times a day as needed for Diarrhea.  Dispense: 15 Capsule; Refill: 0    2. Acute generalized abdominal pain          Comments/MDM:     The patient's presenting symptoms and exam findings are consistent with a viral gastroenteritis.    Abdominal exam without peritoneal signs. No evidence of acute abdomen at this time.  Presentation not consistent with other acute, emergent causes of abdominal pain at this time.    Patient overall is well-appearing in no acute distress.    Normal vital signs.    Tolerating fluids well.    I was unable to elicit any abdominal tenderness on exam.    Recommend increase fluid intake such as sips of fluids and Pedialyte, bland or BRAT diet and increase as tolerated.    May take Imodium as needed for diarrhea  Avoid dairy of fatty foods for now.   If no improvement in 5 to 7 days or any worsening, recommend returning to the urgent care or following up with PCP for re-evaluation.    Patient understands to present immediately to the ER if  any severe abdominal pain, worsening diarrhea, unable to tolerate fluids, or any other concerns.          All questions answered. Patient verbalized understanding and is in agreement with this plan of care.     If symptoms are worsening or not improving in 3-5 days, follow-up with PCP or return to UC. Differential diagnosis, natural history, and supportive care discussed. AVS handout given and reviewed with patient. Patient educated on red flags and when to seek treatment back in ED or UC.     I personally reviewed prior external notes and test results pertinent to today's visit.  I have independently reviewed and interpreted all diagnostics ordered during this urgent care visit.     This dictation has been created using voice recognition software. The accuracy of the dictation is limited by the abilities of the software. I expect there may be some errors of grammar and possibly content. I made every attempt to manually correct the errors within my dictation. However, errors related to voice recognition software may still exist and should be interpreted within the appropriate context.    This note was electronically signed by TYREL Rios

## 2023-11-07 ENCOUNTER — HOSPITAL ENCOUNTER (OUTPATIENT)
Dept: LAB | Facility: MEDICAL CENTER | Age: 48
End: 2023-11-07
Attending: FAMILY MEDICINE
Payer: COMMERCIAL

## 2023-11-07 DIAGNOSIS — R53.83 OTHER FATIGUE: ICD-10-CM

## 2023-11-07 DIAGNOSIS — R35.0 URINARY FREQUENCY: ICD-10-CM

## 2023-11-07 LAB
ALBUMIN SERPL BCP-MCNC: 4.9 G/DL (ref 3.2–4.9)
ALBUMIN/GLOB SERPL: 1.6 G/DL
ALP SERPL-CCNC: 61 U/L (ref 30–99)
ALT SERPL-CCNC: 17 U/L (ref 2–50)
ANION GAP SERPL CALC-SCNC: 13 MMOL/L (ref 7–16)
APPEARANCE UR: CLEAR
AST SERPL-CCNC: 12 U/L (ref 12–45)
BACTERIA #/AREA URNS HPF: NEGATIVE /HPF
BASOPHILS # BLD AUTO: 0.4 % (ref 0–1.8)
BASOPHILS # BLD: 0.04 K/UL (ref 0–0.12)
BILIRUB SERPL-MCNC: 0.6 MG/DL (ref 0.1–1.5)
BILIRUB UR QL STRIP.AUTO: NEGATIVE
BUN SERPL-MCNC: 14 MG/DL (ref 8–22)
CALCIUM ALBUM COR SERPL-MCNC: 9.4 MG/DL (ref 8.5–10.5)
CALCIUM SERPL-MCNC: 10.1 MG/DL (ref 8.5–10.5)
CHLORIDE SERPL-SCNC: 102 MMOL/L (ref 96–112)
CO2 SERPL-SCNC: 22 MMOL/L (ref 20–33)
COLOR UR: YELLOW
CREAT SERPL-MCNC: 0.89 MG/DL (ref 0.5–1.4)
EOSINOPHIL # BLD AUTO: 0.09 K/UL (ref 0–0.51)
EOSINOPHIL NFR BLD: 0.9 % (ref 0–6.9)
EPI CELLS #/AREA URNS HPF: NEGATIVE /HPF
ERYTHROCYTE [DISTWIDTH] IN BLOOD BY AUTOMATED COUNT: 41 FL (ref 35.9–50)
GFR SERPLBLD CREATININE-BSD FMLA CKD-EPI: 80 ML/MIN/1.73 M 2
GLOBULIN SER CALC-MCNC: 3.1 G/DL (ref 1.9–3.5)
GLUCOSE SERPL-MCNC: 91 MG/DL (ref 65–99)
GLUCOSE UR STRIP.AUTO-MCNC: NEGATIVE MG/DL
HCT VFR BLD AUTO: 49.2 % (ref 37–47)
HGB BLD-MCNC: 16.4 G/DL (ref 12–16)
HYALINE CASTS #/AREA URNS LPF: NORMAL /LPF
IMM GRANULOCYTES # BLD AUTO: 0.02 K/UL (ref 0–0.11)
IMM GRANULOCYTES NFR BLD AUTO: 0.2 % (ref 0–0.9)
KETONES UR STRIP.AUTO-MCNC: NEGATIVE MG/DL
LEUKOCYTE ESTERASE UR QL STRIP.AUTO: NEGATIVE
LYMPHOCYTES # BLD AUTO: 2.38 K/UL (ref 1–4.8)
LYMPHOCYTES NFR BLD: 25.1 % (ref 22–41)
MCH RBC QN AUTO: 28.5 PG (ref 27–33)
MCHC RBC AUTO-ENTMCNC: 33.3 G/DL (ref 32.2–35.5)
MCV RBC AUTO: 85.6 FL (ref 81.4–97.8)
MICRO URNS: ABNORMAL
MONOCYTES # BLD AUTO: 0.67 K/UL (ref 0–0.85)
MONOCYTES NFR BLD AUTO: 7.1 % (ref 0–13.4)
NEUTROPHILS # BLD AUTO: 6.3 K/UL (ref 1.82–7.42)
NEUTROPHILS NFR BLD: 66.3 % (ref 44–72)
NITRITE UR QL STRIP.AUTO: NEGATIVE
NRBC # BLD AUTO: 0 K/UL
NRBC BLD-RTO: 0 /100 WBC (ref 0–0.2)
PH UR STRIP.AUTO: 6 [PH] (ref 5–8)
PLATELET # BLD AUTO: 277 K/UL (ref 164–446)
PMV BLD AUTO: 9.5 FL (ref 9–12.9)
POTASSIUM SERPL-SCNC: 4.5 MMOL/L (ref 3.6–5.5)
PROT SERPL-MCNC: 8 G/DL (ref 6–8.2)
PROT UR QL STRIP: NEGATIVE MG/DL
RBC # BLD AUTO: 5.75 M/UL (ref 4.2–5.4)
RBC # URNS HPF: NORMAL /HPF
RBC UR QL AUTO: ABNORMAL
SODIUM SERPL-SCNC: 137 MMOL/L (ref 135–145)
SP GR UR STRIP.AUTO: 1.01
TSH SERPL DL<=0.005 MIU/L-ACNC: 2.87 UIU/ML (ref 0.38–5.33)
UROBILINOGEN UR STRIP.AUTO-MCNC: 0.2 MG/DL
WBC # BLD AUTO: 9.5 K/UL (ref 4.8–10.8)
WBC #/AREA URNS HPF: NORMAL /HPF

## 2023-11-07 PROCEDURE — 36415 COLL VENOUS BLD VENIPUNCTURE: CPT

## 2023-11-07 PROCEDURE — 80053 COMPREHEN METABOLIC PANEL: CPT

## 2023-11-07 PROCEDURE — 84443 ASSAY THYROID STIM HORMONE: CPT

## 2023-11-07 PROCEDURE — 81001 URINALYSIS AUTO W/SCOPE: CPT

## 2023-11-07 PROCEDURE — 85025 COMPLETE CBC W/AUTO DIFF WBC: CPT

## 2023-11-10 ENCOUNTER — OFFICE VISIT (OUTPATIENT)
Dept: MEDICAL GROUP | Facility: LAB | Age: 48
End: 2023-11-10
Payer: COMMERCIAL

## 2023-11-10 VITALS
OXYGEN SATURATION: 98 % | TEMPERATURE: 98.2 F | DIASTOLIC BLOOD PRESSURE: 82 MMHG | WEIGHT: 232 LBS | BODY MASS INDEX: 37.28 KG/M2 | HEART RATE: 74 BPM | HEIGHT: 66 IN | SYSTOLIC BLOOD PRESSURE: 124 MMHG | RESPIRATION RATE: 16 BRPM

## 2023-11-10 DIAGNOSIS — E66.09 CLASS 2 OBESITY DUE TO EXCESS CALORIES WITHOUT SERIOUS COMORBIDITY WITH BODY MASS INDEX (BMI) OF 38.0 TO 38.9 IN ADULT: ICD-10-CM

## 2023-11-10 DIAGNOSIS — R19.7 DIARRHEA, UNSPECIFIED TYPE: ICD-10-CM

## 2023-11-10 DIAGNOSIS — R10.9 ABDOMINAL DISCOMFORT: ICD-10-CM

## 2023-11-10 PROCEDURE — 3079F DIAST BP 80-89 MM HG: CPT | Performed by: FAMILY MEDICINE

## 2023-11-10 PROCEDURE — 99214 OFFICE O/P EST MOD 30 MIN: CPT | Performed by: FAMILY MEDICINE

## 2023-11-10 PROCEDURE — 3074F SYST BP LT 130 MM HG: CPT | Performed by: FAMILY MEDICINE

## 2023-11-10 RX ORDER — LIOTHYRONINE SODIUM 5 UG/1
5 TABLET ORAL DAILY
COMMUNITY
Start: 2023-11-06 | End: 2023-11-10

## 2023-11-10 ASSESSMENT — FIBROSIS 4 INDEX: FIB4 SCORE: 0.5

## 2023-11-10 NOTE — PROGRESS NOTES
Chief Complaint:   Chief Complaint   Patient presents with    Weight Check    Lab Results    GI Problem       HPI: Established patient  Ifrah Brunson is a 48 y.o. female who presents for follow-up, discussed the following today:    Reviewed all lab work results, CBC with mild increase in her hemoglobin and red blood cell mass, discussed with the patient and reassured today.     Diarrhea, unspecified type /Abdominal discomfort  New concern,  Started around 1 week ago.  Patient said she went out of town for [place] day, she was eating at restaurants and staying at a hotel.  Around Saturday night 1 week ago she started to have pain and discomfort in her abdomen, few days later she started to have diarrhea, now diarrhea resolved but she still having some discomfort and stomach upset.  Reports nausea without vomiting.  Denies changes in the urine or urinary concerns.    Class 2 obesity due to excess calories without serious comorbidity with body mass index (BMI) of 38.0 to 38.9 in adult    Patient did not lose weight since last visit she was sick and not feeling well and she is on vacation.  Stable weight did not gain.  Continues to take her Wellbutrin as directed, she said she is not sticking to the routine because of her recent sickness.        Past medical history, family history, social history and medications reviewed and updated in the record.  Today  Current medications, problem list and allergies reviewed in EPIC today  Health maintenance topics are reviewed and updated.    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    Gastroesophageal reflux disease without  esophagitis 10/11/2022    Thyroid cancer (HCC) 2020    Rib pain on right side 2014    Migraines 2014    Anxiety and depression 2014    Post-surgical hypothyroidism      Family History   Problem Relation Age of Onset    Hyperlipidemia Mother     Heart Disease Father     Hyperlipidemia Father     Psychiatric Illness Father         depression    Diabetes Father         Type 2    Arterial Aneurysm Father         Thoracic and abdominal aortic    Cancer Maternal Uncle         Adrenal Cancer    Cancer Maternal Grandmother         Breast Cancer/Lung Cancer    Breast Cancer Maternal Grandmother     Heart Disease Maternal Grandfather     Hypertension Maternal Grandfather     Hyperlipidemia Maternal Grandfather     Diabetes Paternal Grandmother         Type 2    Heart Disease Paternal Grandfather     Hypertension Paternal Grandfather     Hyperlipidemia Paternal Grandfather     Cancer Paternal Uncle         Colon Cancer    Colorectal Cancer Paternal Uncle     Stroke Neg Hx      Social History     Socioeconomic History    Marital status:      Spouse name: Not on file    Number of children: Not on file    Years of education: Not on file    Highest education level: Not on file   Occupational History     Comment: [employer]     Tobacco Use    Smoking status: Former     Current packs/day: 0.00     Types: Cigarettes     Quit date: 2009     Years since quittin.8    Smokeless tobacco: Never    Tobacco comments:     Social smoker   Vaping Use    Vaping Use: Never used   Substance and Sexual Activity    Alcohol use: Yes     Alcohol/week: 1.2 oz     Types: 1 Glasses of wine, 1 Cans of beer per week     Comment: Occasional drinker, especially since started medications    Drug use: No    Sexual activity: Not on file     Comment: singe   Other Topics Concern    Not on file   Social History Narrative    Not on file     Social Determinants of Health     Financial Resource Strain: Not  "on file   Food Insecurity: Not on file   Transportation Needs: Not on file   Physical Activity: Not on file   Stress: Not on file   Social Connections: Not on file   Intimate Partner Violence: Not on file   Housing Stability: Not on file       Current Outpatient Medications   Medication Sig Dispense Refill    loperamide (IMODIUM) 2 MG Cap Take 1 Capsule by mouth 4 times a day as needed for Diarrhea. 15 Capsule 0    buPROPion (WELLBUTRIN XL) 300 MG XL tablet Take 1 Tablet by mouth every morning. 90 Tablet 3    pantoprazole (PROTONIX) 40 MG Tablet Delayed Response Take 1 Tablet by mouth every day. 90 Tablet 3    traZODone (DESYREL) 50 MG Tab Take 50 mg by mouth at bedtime as needed for Sleep.      liothyronine (CYTOMEL) 5 MCG Tab Take 5 mcg by mouth every morning.      levothyroxine (SYNTHROID) 175 MCG Tab Take 175 mcg by mouth see administration instructions. Hold on Sunday, take all other days      dicyclomine (BENTYL) 10 MG Cap Take 10 mg by mouth every 6 hours as needed (abddomen cramping).      cetirizine (ZYRTEC) 10 MG Tab Take 10 mg by mouth every morning.       No current facility-administered medications for this visit.        Review Of Systems  As documented in HPI above  PHYSICAL EXAMINATION:    /82 (BP Location: Right arm, Patient Position: Sitting, BP Cuff Size: Adult)   Pulse 74   Temp 36.8 °C (98.2 °F) (Temporal)   Resp 16   Ht 1.676 m (5' 6\")   Wt 105 kg (232 lb)   SpO2 98%   BMI 37.45 kg/m²   Gen.: Well-developed, well-nourished, no apparent distress, pleasant and cooperative with the examination  HEENT: Normocephalic/atraumatic,   Neck: No JVD or bruits, no adenopathy  Cor: Regular rate and rhythm without murmur gallop or rub  Lungs: Clear to auscultation with equal breath sounds bilaterally. No wheezes, rhonchi.  Abdomen: Soft nontender without hepatosplenomegaly or masses appreciated, normoactive bowel sounds  Extremities: No cyanosis, clubbing or edema       ASSESSMENT/Plan:  1. " Diarrhea, unspecified type  New concern, I am highly suspecting it is related to viral gastroenteritis, advised the patient to continue hydration and push fluids, supportive care, if not resolved to come back for further assessment.      2. Abdominal discomfort  New concern related to possibly gastroenteritis, resolved at this time.  Reported nausea and diarrhea and abdominal discomfort after eating in restaurants and staying out of town.  Symptomatic treatment for now and watchful waiting, if not resolved to come back for further assessment      3. Class 2 obesity due to excess calories without serious comorbidity with body mass index (BMI) of 38.0 to 38.9 in adult  Chronic, advised when she is back to her baseline of health to start again her routine of calorie deficiency high-protein diet and exercise and high water intake.  And follow-up regularly for weight check.         Please note that this dictation was created using voice recognition software. I have worked with consultants from the vendor as well as technical experts from [employer] [facility] "[employer] to optimize the interface. I have made every reasonable attempt to correct obvious errors, but I expect that there are errors of grammar and possibly content that I did not discover before finalizing the note.

## 2023-12-22 ENCOUNTER — APPOINTMENT (OUTPATIENT)
Dept: RADIOLOGY | Facility: MEDICAL CENTER | Age: 48
End: 2023-12-22
Attending: INTERNAL MEDICINE
Payer: COMMERCIAL

## 2023-12-26 ENCOUNTER — HOSPITAL ENCOUNTER (OUTPATIENT)
Dept: RADIOLOGY | Facility: MEDICAL CENTER | Age: 48
End: 2023-12-26
Attending: INTERNAL MEDICINE
Payer: COMMERCIAL

## 2023-12-26 DIAGNOSIS — R11.0 NAUSEA: ICD-10-CM

## 2023-12-26 DIAGNOSIS — R19.4 CHANGE IN BOWEL HABITS: ICD-10-CM

## 2023-12-26 DIAGNOSIS — R19.7 DIARRHEA, UNSPECIFIED TYPE: ICD-10-CM

## 2023-12-26 PROCEDURE — 74177 CT ABD & PELVIS W/CONTRAST: CPT

## 2023-12-26 PROCEDURE — 700117 HCHG RX CONTRAST REV CODE 255: Performed by: INTERNAL MEDICINE

## 2023-12-26 RX ADMIN — IOHEXOL 25 ML: 240 INJECTION, SOLUTION INTRATHECAL; INTRAVASCULAR; INTRAVENOUS; ORAL at 15:27

## 2023-12-26 RX ADMIN — IOHEXOL 100 ML: 350 INJECTION, SOLUTION INTRAVENOUS at 15:27

## 2023-12-27 ENCOUNTER — HOSPITAL ENCOUNTER (OUTPATIENT)
Dept: LAB | Facility: MEDICAL CENTER | Age: 48
End: 2023-12-27
Attending: INTERNAL MEDICINE
Payer: COMMERCIAL

## 2023-12-27 LAB
AMYLASE SERPL-CCNC: 38 U/L (ref 20–103)
BUN SERPL-MCNC: 11 MG/DL (ref 8–22)
CRP SERPL HS-MCNC: <0.3 MG/DL (ref 0–0.75)
ERYTHROCYTE [SEDIMENTATION RATE] IN BLOOD BY WESTERGREN METHOD: 5 MM/HOUR (ref 0–25)
LIPASE SERPL-CCNC: 43 U/L (ref 11–82)

## 2023-12-27 PROCEDURE — 86258 DGP ANTIBODY EACH IG CLASS: CPT | Mod: 91

## 2023-12-27 PROCEDURE — 86140 C-REACTIVE PROTEIN: CPT

## 2023-12-27 PROCEDURE — 82150 ASSAY OF AMYLASE: CPT

## 2023-12-27 PROCEDURE — 36415 COLL VENOUS BLD VENIPUNCTURE: CPT

## 2023-12-27 PROCEDURE — 84520 ASSAY OF UREA NITROGEN: CPT

## 2023-12-27 PROCEDURE — 82105 ALPHA-FETOPROTEIN SERUM: CPT

## 2023-12-27 PROCEDURE — 85652 RBC SED RATE AUTOMATED: CPT

## 2023-12-27 PROCEDURE — 86364 TISS TRNSGLTMNASE EA IG CLAS: CPT | Mod: 91

## 2023-12-27 PROCEDURE — 83690 ASSAY OF LIPASE: CPT

## 2023-12-29 LAB
AFP-TM SERPL-MCNC: 2 NG/ML (ref 0–9)
GLIADIN IGA SER IA-ACNC: <0.72 FLU (ref 0–4.99)
TTG IGA SER IA-ACNC: <1.02 FLU (ref 0–4.99)

## 2023-12-30 LAB
GLIADIN IGG SER IA-ACNC: 1.83 FLU (ref 0–4.99)
TTG IGG SER IA-ACNC: <0.82 FLU (ref 0–4.99)

## 2024-01-03 ENCOUNTER — OFFICE VISIT (OUTPATIENT)
Dept: MEDICAL GROUP | Facility: LAB | Age: 49
End: 2024-01-03
Payer: COMMERCIAL

## 2024-01-03 VITALS
HEIGHT: 66 IN | DIASTOLIC BLOOD PRESSURE: 72 MMHG | HEART RATE: 68 BPM | TEMPERATURE: 96.8 F | OXYGEN SATURATION: 98 % | SYSTOLIC BLOOD PRESSURE: 120 MMHG | RESPIRATION RATE: 14 BRPM | BODY MASS INDEX: 37.54 KG/M2 | WEIGHT: 233.6 LBS

## 2024-01-03 DIAGNOSIS — R91.1 LUNG NODULE SEEN ON IMAGING STUDY: ICD-10-CM

## 2024-01-03 PROCEDURE — 3074F SYST BP LT 130 MM HG: CPT | Performed by: NURSE PRACTITIONER

## 2024-01-03 PROCEDURE — 99214 OFFICE O/P EST MOD 30 MIN: CPT | Performed by: NURSE PRACTITIONER

## 2024-01-03 PROCEDURE — 3078F DIAST BP <80 MM HG: CPT | Performed by: NURSE PRACTITIONER

## 2024-01-03 ASSESSMENT — FIBROSIS 4 INDEX: FIB4 SCORE: 0.5

## 2024-01-03 NOTE — PROGRESS NOTES
"Subjective:     CC:   Chief Complaint   Patient presents with    Results     CT scan      HPI:   Ifrah presents today with the following:    Lung nodule  Most recent CT scan results 12/26/2023:  1.  10.3 mm nodule within the right lung base.  2.  There are 3 hypodense lesions within liver that demonstrate peripheral puddling of contrast on delayed contrast imaging consistent with cavernous hemangiomas. Depending on clinical findings, MRI follow-up is a consideration.  3.  Splenomegaly.  4.  Scattered colon diverticula. No evidence diverticulitis. No free fluid.     Single nodule greater than 8 mm:  Fleischner Society pulmonary nodule recommendations:  Low and High Risk: Consider CT at 3 months, PET/CT, or tissue sampling.    CT scan 1/3/2022:  Limited chest: 0.9 cm right lower lobe pulmonary nodule.     Has MRI abdomen/pelvis scheduled. Will need referral for lung nodule.     ROS:   As documented in history of present illness above    Objective:     Exam: /72 (BP Location: Right arm, Patient Position: Sitting, BP Cuff Size: Adult)   Pulse 68   Temp 36 °C (96.8 °F)   Resp 14   Ht 1.676 m (5' 6\")   Wt 106 kg (233 lb 9.6 oz)   SpO2 98%  Body mass index is 37.7 kg/m².    Constitutional: Alert, no distress, well-groomed.  Skin: Warm, dry, good turgor, no rashes in visible areas.  Eye: Equal, round and reactive, conjunctiva clear, lids normal.  ENMT: Lips without lesions, good dentition, moist mucous membranes.  Neck: Trachea midline, no masses, no thyromegaly.  Respiratory: Unlabored respiratory effort, no cough.  MSK: Normal gait, moves all extremities.  Neuro: Grossly non-focal.   Psych: Alert and oriented x3, normal affect and mood.    Assessment & Plan:     48 y.o. female with the following -     1. Lung nodule seen on imaging study  Reviewed recent labs and imaging. Referral placed to Intake Oncology Coordinator. Discussed additional imaging with PET scan.   - Referral to Intake Oncology Coordinator  "

## 2024-01-15 ENCOUNTER — HOSPITAL ENCOUNTER (OUTPATIENT)
Dept: RADIOLOGY | Facility: MEDICAL CENTER | Age: 49
End: 2024-01-15
Attending: INTERNAL MEDICINE
Payer: COMMERCIAL

## 2024-01-15 DIAGNOSIS — R93.89 ABNORMAL CT SCAN: ICD-10-CM

## 2024-01-15 PROCEDURE — 700117 HCHG RX CONTRAST REV CODE 255: Mod: JZ | Performed by: INTERNAL MEDICINE

## 2024-01-15 PROCEDURE — 74183 MRI ABD W/O CNTR FLWD CNTR: CPT

## 2024-01-15 PROCEDURE — A9581 GADOXETATE DISODIUM INJ: HCPCS | Mod: JZ | Performed by: INTERNAL MEDICINE

## 2024-01-15 RX ADMIN — GADOXETATE DISODIUM 10 ML: 181.43 INJECTION, SOLUTION INTRAVENOUS at 14:34

## 2024-01-16 ENCOUNTER — APPOINTMENT (OUTPATIENT)
Dept: RADIOLOGY | Facility: MEDICAL CENTER | Age: 49
End: 2024-01-16
Attending: INTERNAL MEDICINE
Payer: COMMERCIAL

## 2024-01-18 ENCOUNTER — HOSPITAL ENCOUNTER (OUTPATIENT)
Dept: HEMATOLOGY ONCOLOGY | Facility: MEDICAL CENTER | Age: 49
End: 2024-01-18
Attending: NURSE PRACTITIONER
Payer: COMMERCIAL

## 2024-01-18 VITALS
BODY MASS INDEX: 35.77 KG/M2 | HEIGHT: 68 IN | WEIGHT: 236 LBS | SYSTOLIC BLOOD PRESSURE: 136 MMHG | OXYGEN SATURATION: 96 % | RESPIRATION RATE: 16 BRPM | DIASTOLIC BLOOD PRESSURE: 84 MMHG | HEART RATE: 77 BPM | TEMPERATURE: 97.1 F

## 2024-01-18 DIAGNOSIS — R91.1 LUNG NODULE: ICD-10-CM

## 2024-01-18 PROCEDURE — 99204 OFFICE O/P NEW MOD 45 MIN: CPT | Performed by: NURSE PRACTITIONER

## 2024-01-18 PROCEDURE — 99212 OFFICE O/P EST SF 10 MIN: CPT | Performed by: NURSE PRACTITIONER

## 2024-01-18 ASSESSMENT — PAIN SCALES - GENERAL: PAINLEVEL: 3=SLIGHT PAIN

## 2024-01-18 ASSESSMENT — ENCOUNTER SYMPTOMS
NAUSEA: 1
WEIGHT LOSS: 0
CONSTIPATION: 1
HEARTBURN: 1
DIARRHEA: 1
VOMITING: 0
COUGH: 0
DIZZINESS: 0
CHILLS: 0
BACK PAIN: 1
WHEEZING: 0
FEVER: 0
SHORTNESS OF BREATH: 1
PALPITATIONS: 0
DIAPHORESIS: 1
HEADACHES: 1

## 2024-01-18 ASSESSMENT — FIBROSIS 4 INDEX: FIB4 SCORE: 0.5

## 2024-01-18 NOTE — PROGRESS NOTES
Subjective     Ifrah Brunson is a 48 y.o. female who presents with New Patient (IOC/Lung nodule seen on imaging study/Kateryna sosa)        HPI    Patient referred to me, Intake Oncology Coordinator by her PCP JIMMIE Woods for lung nodule.  Patient is accompanied by her mother for today's visit.    Patient established with gastroenterology physician due to abdominal complaints.  She was seen by Dr. Moreno who ordered a CT abdomen and pelvis completed on 12/26/2023.  Patient noted to have 3 hypodense lesions within the liver noted.  They did recommend MRI for follow-up as a consideration.  However incidental finding did note a 10.3 mm nodule in the right lung base.  She also did have splenomegaly on the CT scan as well as scattered colon diverticula.  She then had an MRI completed on 1/15/2024.  Confirmed that the 3 lesions on the CT scan were benign cavernous hemangiomas.  MRI did not show splenomegaly at this time.  I did personally review the image report images in detail, and I reviewed the report and images in detail with the patient and her mother today.  Patient had also undergone colonoscopy and endoscopy with her GI physician.  She was told that she has IBS.    Patient had an episode in which she presented to the emergency department back in January 2022 for abdominal pain.  At that time she had a CT abdomen and pelvis as well.  There was noted again an incidental finding of a right lower lobe lung nodule but at that time was measuring 9 mm.  We are attempting to get records but I did visually see the report on patient's MyChart on her phone today.    Patient with a significant medical history.  She was diagnosed with thyroid cancer in 2012.  According to the patient it was stage II.  She underwent a thyroidectomy with her ENT physician.  She also did have a left neck dissection.  She was also seen by Dr. Farooq and endocrinologist at that time.  She stated that they did not believe she  needed to undergo I-131 radiation.  She had follow-up thyroid uptake scans since her diagnosis and have been found to be negative. In April 2023 patient was noted to have enlargement of neck lymph nodes on the right side.  She did undergo biopsy at [facility] at that time.  Lymph nodes were evaluated and no evidence of malignancy or papillary carcinoma on FNA at that time.    Patient also with a surgical history of craniotomy for trigeminal neuralgia.    Patient with significant clinical complaints.  She does note that she has night sweats a couple times per week that has been going on for the last 5 years.  She is status post uterine ablation and has not had a menstrual cycle since.  She stated that she has had hormone levels checked and is still perimenopausal.  She does note increased fatigue.  She also notes shortness of breath at rest and with activity.  She does have alternating diarrhea and constipation, as well as some nausea and heartburn.  She does have some mild lower back pain at this time and does have chronic headaches.    Please see past medical and surgical history below.    Patient stated that she smoked very socially for approximately 1-2 years in which she would have maybe 1 cigarette once a week at most.    Patient does have a family history of cancer in her maternal uncle who had adrenal cancer.  Paternal uncle with colon cancer.  Maternal grandmother with breast cancer followed by a second primary lung cancer which she eventually passed away from.      Allergies   Allergen Reactions    Morphine Hives and Itching     Current Outpatient Medications on File Prior to Encounter   Medication Sig Dispense Refill    loperamide (IMODIUM) 2 MG Cap Take 1 Capsule by mouth 4 times a day as needed for Diarrhea. 15 Capsule 0    buPROPion (WELLBUTRIN XL) 300 MG XL tablet Take 1 Tablet by mouth every morning. 90 Tablet 3    pantoprazole (PROTONIX) 40 MG Tablet Delayed Response Take 1 Tablet by mouth every  day. 90 Tablet 3    traZODone (DESYREL) 50 MG Tab Take 50 mg by mouth at bedtime as needed for Sleep.      liothyronine (CYTOMEL) 5 MCG Tab Take 5 mcg by mouth every morning.      levothyroxine (SYNTHROID) 175 MCG Tab Take 175 mcg by mouth see administration instructions. Hold on Sunday, take all other days      dicyclomine (BENTYL) 10 MG Cap Take 10 mg by mouth every 6 hours as needed (abddomen cramping).      cetirizine (ZYRTEC) 10 MG Tab Take 10 mg by mouth every morning.       No current facility-administered medications on file prior to encounter.     Past Medical History:   Diagnosis Date    Cancer (HCC) 2012    Thyroid cancer    Hiatus hernia syndrome 2022    High cholesterol 2023    Elevated bad cholesterol, no meds required    Indigestion 2022    GERD, bile reflux, hiatal hernia    PONV (postoperative nausea and vomiting) 2010 and 2012    Post-surgical hypothyroidism 01/01/2012    Thyroid cancer - papillary     Psychiatric problem 1990    Depression, anxiety     Past Surgical History:   Procedure Laterality Date    CRANIOTOMY Right 5/18/2023    Procedure: RIGHT CRANIOTOMY FOR MICROVASCULAR DECOMPRESSION;  Surgeon: Shruthi Echeverria M.D.;  Location: SURGERY [facility];  Service: Neurosurgery    CHOLECYSTECTOMY  01/01/2009    GYN SURGERY  2016    Endometrial ablation    OTHER  2012    Complete thyroidectomy & left neck dissection     Family History   Problem Relation Age of Onset    Hyperlipidemia Mother     Heart Disease Father     Hyperlipidemia Father     Psychiatric Illness Father         depression    Diabetes Father         Type 2    Arterial Aneurysm Father         Thoracic and abdominal aortic    Cancer Maternal Uncle         Adrenal Cancer    Cancer Paternal Uncle         Colon Cancer    Colorectal Cancer Paternal Uncle     Cancer Maternal Grandmother         Breast Cancer/Lung Cancer    Breast Cancer Maternal Grandmother     Heart Disease Maternal Grandfather     Hypertension Maternal Grandfather   "   Hyperlipidemia Maternal Grandfather     Diabetes Paternal Grandmother         Type 2    Heart Disease Paternal Grandfather     Hypertension Paternal Grandfather     Hyperlipidemia Paternal Grandfather     Stroke Neg Hx      Social History     Socioeconomic History    Marital status:    Occupational History     Comment: [employer]     Tobacco Use    Smoking status: Former     Current packs/day: 0.00     Types: Cigarettes     Quit date: 1/16/2009     Years since quitting: 15.0    Smokeless tobacco: Never    Tobacco comments:     Social smoker in her 20s for about 2-3 years    Vaping Use    Vaping Use: Never used   Substance and Sexual Activity    Alcohol use: Yes     Alcohol/week: 1.2 oz     Types: 1 Glasses of wine, 1 Cans of beer per week     Comment: Occasional drinker, especially since started medications    Drug use: No    Sexual activity: Yes     Partners: Male     Comment: singe   Social History Narrative            Review of Systems   Constitutional:  Positive for diaphoresis (a couple times per week for the last 5 years or so) and malaise/fatigue. Negative for chills, fever and weight loss.   Respiratory:  Positive for shortness of breath (at rest and with activity). Negative for cough and wheezing.    Cardiovascular:  Negative for chest pain and palpitations.   Gastrointestinal:  Positive for constipation, diarrhea, heartburn and nausea. Negative for vomiting.   Genitourinary:  Negative for dysuria.   Musculoskeletal:  Positive for back pain (lower back).   Neurological:  Positive for headaches (chronic). Negative for dizziness.              Objective     /84 (BP Location: Right arm, Patient Position: Sitting, BP Cuff Size: Adult)   Pulse 77   Temp 36.2 °C (97.1 °F) (Temporal)   Resp 16   Ht 1.73 m (5' 8.11\")   Wt 107 kg (236 lb)   LMP  (Within Years) Comment: melisa 2014  SpO2 96%   BMI 35.77 kg/m²      Physical Exam  Vitals reviewed.   Constitutional: "       General: She is not in acute distress.     Appearance: Normal appearance. She is not diaphoretic.   HENT:      Head: Normocephalic and atraumatic.   Cardiovascular:      Rate and Rhythm: Normal rate and regular rhythm.      Heart sounds: Normal heart sounds. No murmur heard.     No friction rub. No gallop.   Pulmonary:      Effort: Pulmonary effort is normal. No respiratory distress.      Breath sounds: Normal breath sounds. No wheezing.   Abdominal:      General: Bowel sounds are normal. There is no distension.      Palpations: Abdomen is soft.      Tenderness: There is no abdominal tenderness.   Musculoskeletal:         General: No swelling or tenderness. Normal range of motion.   Skin:     General: Skin is warm and dry.   Neurological:      Mental Status: She is alert and oriented to person, place, and time.   Psychiatric:         Mood and Affect: Mood normal.         Behavior: Behavior normal.              MR-ABDOMEN-WITH & W/O    Result Date: 1/15/2024  1/15/2024 1:33 PM HISTORY/REASON FOR EXAM:  Follow-up liver lesions TECHNIQUE/EXAM DESCRIPTION: MRI of the liver with dynamic IV gadolinium enhancement. MR imaging of the liver was performed. MR images of the liver were obtained with coronal single-shot fast spin-echo T2, fat-suppressed axial FRFSE T2, axial in-phase and out-of-phase FSPGR T1, precontrast fat-suppressed FSPGR T1, dynamic gadolinium enhanced axial fat-suppressed T1 FSPGR in the arterial dominant, portal venous, and 2-minute and 4-minute delayed phases, with delayed coronal fat-suppressed T1 FSPGR sequence.. The study was performed on a Rivalfox Signa 1.5 Gela MRI scanner. 10 mL ProHance contrast was administered intravenously. COMPARISON: CT 12/26/2023. FINDINGS: Liver: There are 3 hemangiomas in the liver. The largest measures 7.7 x 6.1 cm. Bile Ducts: No intrahepatic or extrahepatic bile duct dilation or lesion seen. Spleen: Normal in size without focal lesion. Gallbladder: Absent. Pancreas  and Pancreatic Duct: Normal. No pancreatic duct dilation is seen. Kidneys: Normal. Adrenal Glands: Normal. Ascites: Not present. Lymph Nodes: No abdominal lymphadenopathy is seen. Visualized Bowel: Grossly normal. Bones: No suspicious osseous lesions are noted.     1. The 3 lesions seen on CT are benign cavernous hemangiomas. The largest measures 7.7 cm. No suspicious liver lesion identified.     CT-ABDOMEN-PELVIS WITH    Result Date: 12/26/2023 12/26/2023 3:07 PM HISTORY/REASON FOR EXAM:  Other. Change in bowel habits. Diarrhea. TECHNIQUE/EXAM DESCRIPTION:   CT scan of the abdomen and pelvis with contrast. Contrast-enhanced helical scanning was obtained from the diaphragmatic domes through the pubic symphysis following the bolus administration of nonionic contrast without complication. 100 mL of Omnipaque 350 nonionic contrast was administered without complication. Low dose optimization technique was utilized for this CT exam including automated exposure control and adjustment of the mA and/or kV according to patient size. COMPARISON: No prior studies available. FINDINGS: Lower Chest: There is a 10.3 mm nodule base of the right lower lobe image 19.. Liver: There is a 7.0 cm lobulated hypodense lesion within the dome of the left lobe of the liver that measures 6.2 cm delayed contrast imaging. There is a 5.6 cm hypodense lesion anterior right lobe of the liver that measures 4.4 cm delayed contrast imaging. There is a 2.8 cm hypodense lesion inferior right lobe of liver that measures 2.5 cm delayed contrast imaging. There appears to be peripheral puddling of contrast within all 3 lesions especially within the 7.0 cm dome of the liver lesion. These findings are consistent with cavernous hemangiomas. Spleen: Spleen is enlarged measuring 14.1 cm AP diameter.. Pancreas: No pancreatic mass. Gallbladder: The gallbladder is not delineated.. Biliary: Nondilated. Adrenal glands: No interval gland mass.. Kidneys: No  hydronephrosis.. Bowel: No evidence of bowel obstruction or acute appendicitis. There are scattered diverticula colon. No evidence of diverticulitis.. Lymph nodes: No adenopathy. Vasculature: Units opacified and patent. Portal venous system is opacified and patent.. Peritoneum: Unremarkable without ascites. Musculoskeletal: No acute or destructive process. Pelvis: No focal urinary bladder wall thickening.. Uterus and adnexal structures are not well delineated. No pelvic free fluid.     1.  10.3 mm nodule within the right lung base. 2.  There are 3 hypodense lesions within liver that demonstrate peripheral puddling of contrast on delayed contrast imaging consistent with cavernous hemangiomas. Depending on clinical findings, MRI follow-up is a consideration. 3.  Splenomegaly. 4.  Scattered colon diverticula. No evidence diverticulitis. No free fluid. Single nodule greater than 8 mm: Fleischner Society pulmonary nodule recommendations: Low and High Risk: Consider CT at 3 months, PET/CT, or tissue sampling. Low Risk - Minimal or absent history of smoking and of other known risk factors. High Risk - History of smoking or of other known risk factors. Note: These recommendations do not apply to lung cancer screening, patients with immunosuppression, or patients with known primary cancer. Fleischner Society 2017 Guidelines for Management of Incidentally Detected Pulmonary Nodules in Adults             Assessment & Plan       1. Lung nodule  [id number] BASE TO MID-THIGH              Patient with a lung nodule in the right lower lobe.  According to patient and I was able to see the report she had a previous CT scan in January 2022 which showed a right lower lobe nodule that measured 9 mm.  Most recent CT shows that the measurement is down 10.3 mm.  Patient has not had a CT scan of her lungs, however she does have significant history of thyroid cancer 2012 status post thyroidectomy and left neck dissection.  She also has  had previous biopsies of lymph node enlargement of the neck on the contralateral side recently found to be negative.  Based on the total picture, I discussed with patient and her mother I do recommend proceeding with a PET/CT for further evaluation.  Patient did verbalize understanding's and agreed with the plan.  PET/CT currently scheduled for 2/1/2024 and she will follow-up with me in the clinic to review the results and discuss further plan of care at that time.      Please note that this dictation was created using voice recognition software. I have made every reasonable attempt to correct obvious errors, but I expect that there are errors of grammar and possibly content that I did not discover before finalizing the note.

## 2024-01-19 NOTE — ADDENDUM NOTE
Encounter addended by: Nati Wadsworth, Med Ass't on: 1/18/2024 4:42 PM   Actions taken: Charge Capture section accepted

## 2024-01-24 ENCOUNTER — RESEARCH ENCOUNTER (OUTPATIENT)
Dept: RESEARCH | Facility: MEDICAL CENTER | Age: 49
End: 2024-01-24
Payer: COMMERCIAL

## 2024-01-24 DIAGNOSIS — Z00.6 RESEARCH STUDY PATIENT: ICD-10-CM

## 2024-01-24 NOTE — RESEARCH NOTE
Pt. Referred via Adali Powell in Oncology, sent consent form. Will try to come by next week to do saliva sample in office. If not scheduled her appt at [facility] on 2/5

## 2024-01-29 ENCOUNTER — PATIENT MESSAGE (OUTPATIENT)
Dept: MEDICAL GROUP | Facility: LAB | Age: 49
End: 2024-01-29
Payer: COMMERCIAL

## 2024-01-30 RX ORDER — ONDANSETRON 4 MG/1
4 TABLET, FILM COATED ORAL EVERY 8 HOURS PRN
Qty: 20 TABLET | Refills: 0 | Status: SHIPPED | OUTPATIENT
Start: 2024-01-30 | End: 2024-02-09

## 2024-01-30 NOTE — PATIENT COMMUNICATION
Received request via: Patient    Was the patient seen in the last year in this department? Yes  LOV : 1/3/2024   Does the patient have an active prescription (recently filled or refills available) for medication(s) requested? No    Pharmacy Name: [facility]    Does the patient have skilled nursing Plus and need 100 day supply (blood pressure, diabetes and cholesterol meds only)? Patient does not have SCP

## 2024-02-01 ENCOUNTER — APPOINTMENT (OUTPATIENT)
Dept: RADIOLOGY | Facility: MEDICAL CENTER | Age: 49
End: 2024-02-01
Attending: NURSE PRACTITIONER
Payer: COMMERCIAL

## 2024-02-02 ENCOUNTER — HOSPITAL ENCOUNTER (OUTPATIENT)
Dept: RADIOLOGY | Facility: MEDICAL CENTER | Age: 49
End: 2024-02-02
Attending: NURSE PRACTITIONER
Payer: COMMERCIAL

## 2024-02-02 DIAGNOSIS — R91.1 LUNG NODULE: ICD-10-CM

## 2024-02-02 PROCEDURE — A9552 F18 FDG: HCPCS

## 2024-02-05 ENCOUNTER — HOSPITAL ENCOUNTER (OUTPATIENT)
Dept: HEMATOLOGY ONCOLOGY | Facility: MEDICAL CENTER | Age: 49
End: 2024-02-05
Attending: NURSE PRACTITIONER
Payer: COMMERCIAL

## 2024-02-05 ENCOUNTER — RESEARCH ENCOUNTER (OUTPATIENT)
Dept: RESEARCH | Facility: MEDICAL CENTER | Age: 49
End: 2024-02-05
Payer: COMMERCIAL

## 2024-02-05 ENCOUNTER — APPOINTMENT (OUTPATIENT)
Dept: ADMISSIONS | Facility: MEDICAL CENTER | Age: 49
End: 2024-02-05
Attending: INTERNAL MEDICINE
Payer: COMMERCIAL

## 2024-02-05 VITALS
WEIGHT: 234.46 LBS | TEMPERATURE: 97.6 F | RESPIRATION RATE: 16 BRPM | HEART RATE: 72 BPM | HEIGHT: 68 IN | BODY MASS INDEX: 35.53 KG/M2 | OXYGEN SATURATION: 97 % | SYSTOLIC BLOOD PRESSURE: 130 MMHG | DIASTOLIC BLOOD PRESSURE: 82 MMHG

## 2024-02-05 DIAGNOSIS — R94.8 ABNORMAL POSITRON EMISSION TOMOGRAPHY (PET) SCAN: ICD-10-CM

## 2024-02-05 DIAGNOSIS — Z00.6 RESEARCH STUDY PATIENT: ICD-10-CM

## 2024-02-05 DIAGNOSIS — R91.1 LUNG NODULE: ICD-10-CM

## 2024-02-05 PROCEDURE — 99214 OFFICE O/P EST MOD 30 MIN: CPT | Performed by: NURSE PRACTITIONER

## 2024-02-05 PROCEDURE — 99212 OFFICE O/P EST SF 10 MIN: CPT | Performed by: NURSE PRACTITIONER

## 2024-02-05 ASSESSMENT — PAIN SCALES - GENERAL: PAINLEVEL: NO PAIN

## 2024-02-05 ASSESSMENT — ENCOUNTER SYMPTOMS
COUGH: 1
ABDOMINAL PAIN: 1
HEADACHES: 1

## 2024-02-05 ASSESSMENT — FIBROSIS 4 INDEX: FIB4 SCORE: 0.5

## 2024-02-05 NOTE — RESEARCH NOTE
Confirmed with the participant which designated provider they would like study results shared with. Patient will have an opportunity to share the results with any providers of their choosing in the future by accessing their results from dough.    [facility] enrollment complete. Saliva sample collected onsite.

## 2024-02-05 NOTE — PROGRESS NOTES
Subjective     Ifrah Brunson is a 48 y.o. female who presents with Results ([place]/ [facility] / Follow up w/ PET scan results)          HPI    Patient seen today in follow-up for PET/CT results.  She presents accompanied by her  for today's visit.    Patient originally seen back on 1/18/2024 after referral from PCP for lung nodule. Patient established with gastroenterology physician due to abdominal complaints.  She was seen by Dr. Moreno who ordered a CT abdomen and pelvis completed on 12/26/2023.  Patient noted to have 3 hypodense lesions within the liver noted.  They did recommend MRI for follow-up as a consideration.  However incidental finding did note a 10.3 mm nodule in the right lung base.  She also did have splenomegaly on the CT scan as well as scattered colon diverticula.  She then had an MRI completed on 1/15/2024.  Confirmed that the 3 lesions on the CT scan were benign cavernous hemangiomas.  MRI did not show splenomegaly at this time.     Patient with a significant medical history. She was diagnosed with thyroid cancer in 2012. According to the patient it was stage II. She underwent a thyroidectomy with her ENT physician. She also did have a left neck dissection. She was also seen by Dr. Farooq and endocrinologist at that time. She stated that they did not believe she needed to undergo I-131 radiation. She had follow-up thyroid uptake scans since her diagnosis and have been found to be negative. In April 2023 patient was noted to have enlargement of neck lymph nodes on the right side. She did undergo biopsy at [facility] at that time. Lymph nodes were evaluated and no evidence of malignancy or papillary carcinoma on FNA at that time.     Based on imaging findings PET/CT was recommended which was completed on 2/2/2024.  PET/CT does show that the right lower lobe lung nodule does have hypermetabolic activity with an SUV of 3.97.  Reading radiologist stated that this is concerning for  malignancy.  No abnormal activity in the left lung or mediastinum.  Was also noted that patient had focal increased activity in the right ovary concerning for malignancy.  PET scan did confirm that the multiple large hypodense liver lesions were non-PET avid consistent with known hemangiomas.  I did personally review the imaging report images in detail, reviewed them with the patient today.    Patient has had previous evaluation of the pelvis.  Her CT abdomen and pelvis with contrast on 12/26/2023 noted that the uterus and adnexal structures were not well-delineated.  Per patient she did have a transvaginal pelvic ultrasound back on 5/12/2021 which did note multiple uterine leiomyomas, but also noted a simple cyst on the right ovary that measured 1.8 cm in size.  Patient's gynecologist is in [place].    Allergies   Allergen Reactions    Morphine Hives and Itching     Current Outpatient Medications on File Prior to Encounter   Medication Sig Dispense Refill    ondansetron (ZOFRAN) 4 MG Tab tablet Take 1 Tablet by mouth every 8 hours as needed for Nausea/Vomiting for up to 10 days. 20 Tablet 0    loperamide (IMODIUM) 2 MG Cap Take 1 Capsule by mouth 4 times a day as needed for Diarrhea. 15 Capsule 0    buPROPion (WELLBUTRIN XL) 300 MG XL tablet Take 1 Tablet by mouth every morning. 90 Tablet 3    pantoprazole (PROTONIX) 40 MG Tablet Delayed Response Take 1 Tablet by mouth every day. 90 Tablet 3    traZODone (DESYREL) 50 MG Tab Take 50 mg by mouth at bedtime as needed for Sleep.      liothyronine (CYTOMEL) 5 MCG Tab Take 5 mcg by mouth every morning.      levothyroxine (SYNTHROID) 175 MCG Tab Take 175 mcg by mouth see administration instructions. Hold on Sunday, take all other days      dicyclomine (BENTYL) 10 MG Cap Take 10 mg by mouth every 6 hours as needed (abddomen cramping).      cetirizine (ZYRTEC) 10 MG Tab Take 10 mg by mouth every morning.       No current facility-administered medications  "on file prior to encounter.            Review of Systems   Respiratory:  Positive for cough (mild).    Gastrointestinal:  Positive for abdominal pain (right sided pain noted recently - slightly better with Ibuporfen).   Neurological:  Positive for headaches (mild over the last few weeks).              Objective     /82 (BP Location: Right arm, Patient Position: Sitting, BP Cuff Size: Adult)   Pulse 72   Temp 36.4 °C (97.6 °F) (Temporal)   Resp 16   Ht 1.73 m (5' 8.11\")   Wt 106 kg (234 lb 7.4 oz)   SpO2 97%   BMI 35.53 kg/m²      Physical Exam  Vitals reviewed.   Constitutional:       General: She is not in acute distress.     Appearance: Normal appearance. She is not diaphoretic.   Pulmonary:      Effort: Pulmonary effort is normal.   Neurological:      Mental Status: She is alert and oriented to person, place, and time.   Psychiatric:         Mood and Affect: Mood normal.         Behavior: Behavior normal.               [id number] BASE TO MID-THIGH    Result Date: 2/2/2024 2/2/2024 2:08 PM HISTORY/REASON FOR EXAM:  RLL Lung nodule - hx of thyroidectomy from cancer TECHNIQUE/EXAM DESCRIPTION AND NUMBER OF VIEWS: PET body imaging. Initially, 9.654 mCi F-18 FDG was administered intravenously under standardized conditions. Approximately 45 minutes after FDG administration, the patient was placed in the supine position on the PET CT table. Blood glucose level was 87 mg/dL. Low dose spiral CT imaging was performed from the skull base to the mid thighs. PET imaging was then performed from the skull base to the mid thighs. CT images, PET images, and PET/CT fused images were reviewed on a PACS 3D workstation. The limited non-contrast CT data are used primarily for attenuation correction and anatomic correlation.  Evaluation of solid organs and bowel are especially limited utilizing this technique. COMPARISON: CT abdomen and pelvis 12/26/2023 FINDINGS: Head and neck: No abnormal focal FDG activity. Chest: " Small focus of increased activity at the RIGHT lung base corresponding to RIGHT lower lobe nodule on CT, max SUV 3.97.  No abnormal activity in the LEFT lung or mediastinum. Abdomen and pelvis: No abnormal activity in the liver or spleen.  Expected urinary activity.  Physiologic bowel uptake.  Probable physiologic activity in both ovaries.  Focal intense uptake in the RIGHT posterior pelvis likely corresponding to ovary. Musculoskeletal: No abnormal focal FDG activity. Incidental findings on CT: RIGHT lower lobe lung nodule measuring 1 cm.  Heterogeneous low-density liver lesions again seen without PET correlate.     1.  Hypermetabolic nodule in the basal RIGHT lower lobe corresponding to CT findings, consistent with malignancy which may be primary or metastatic. 2.  Focal increased uptake within the RIGHT ovary posteriorly concerning for metastatic disease in this patient with history of thyroid cancer. 3.  No PET correlate for multiple large hypodense liver lesions consistent with known hemangiomas.    MR-ABDOMEN-WITH & W/O    Result Date: 1/15/2024  1/15/2024 1:33 PM HISTORY/REASON FOR EXAM:  Follow-up liver lesions TECHNIQUE/EXAM DESCRIPTION: MRI of the liver with dynamic IV gadolinium enhancement. MR imaging of the liver was performed. MR images of the liver were obtained with coronal single-shot fast spin-echo T2, fat-suppressed axial FRFSE T2, axial in-phase and out-of-phase FSPGR T1, precontrast fat-suppressed FSPGR T1, dynamic gadolinium enhanced axial fat-suppressed T1 FSPGR in the arterial dominant, portal venous, and 2-minute and 4-minute delayed phases, with delayed coronal fat-suppressed T1 FSPGR sequence.. The study was performed on a [employer] 1.5 Gela MRI scanner. 10 mL ProHance contrast was administered intravenously. COMPARISON: CT 12/26/2023. FINDINGS: Liver: There are 3 hemangiomas in the liver. The largest measures 7.7 x 6.1 cm. Bile Ducts: No intrahepatic or extrahepatic bile duct dilation  or lesion seen. Spleen: Normal in size without focal lesion. Gallbladder: Absent. Pancreas and Pancreatic Duct: Normal. No pancreatic duct dilation is seen. Kidneys: Normal. Adrenal Glands: Normal. Ascites: Not present. Lymph Nodes: No abdominal lymphadenopathy is seen. Visualized Bowel: Grossly normal. Bones: No suspicious osseous lesions are noted.     1. The 3 lesions seen on CT are benign cavernous hemangiomas. The largest measures 7.7 cm. No suspicious liver lesion identified.             Assessment & Plan       1. Lung nodule  CT-BIOPSY-LUNG/MEDIASTINUM W/GUIDE RIGHT      2. Abnormal positron emission tomography (PET) scan  US-PELVIC COMPLETE (TRANSABDOMINAL/TRANSVAGINAL) (COMBO)              1.  Patient with a lung nodule that was mildly FDG avid with an SUV of 3.97 concerning for possible malignancy.  Based on these findings I do recommend proceeding with a CT-guided biopsy.  Patient is in agreement with the plan.  Offered complete this and follow-up with me in the clinic to review the results.    2.  Patient with abnormal finding on PET scan in the right posterior pelvis area likely corresponding to an ovary.  Radiologist stated there is concern for malignancy.  I will proceed with a transvaginal ultrasound at this time for further evaluation.  Will request films to be uploaded to our system from May 2021 for direct comparison as there was previously noted a simple cyst on the right ovary.  I will contact patient via phone with the results of the ultrasound.  I did discuss with patient the possibility of referring patient over to gynecology oncology as well but will await the ultrasound results.    Patient is in agreement with the plan as discussed above.      Please note that this dictation was created using voice recognition software. I have made every reasonable attempt to correct obvious errors, but I expect that there are errors of grammar and possibly content that I did not discover before finalizing  the note.

## 2024-02-05 NOTE — ADDENDUM NOTE
Encounter addended by: Nati Wadsworth, Med Ass't on: 2/5/2024 9:19 AM   Actions taken: Charge Capture section accepted

## 2024-02-08 ENCOUNTER — HOSPITAL ENCOUNTER (OUTPATIENT)
Dept: RADIOLOGY | Facility: MEDICAL CENTER | Age: 49
End: 2024-02-08
Attending: OBSTETRICS & GYNECOLOGY
Payer: COMMERCIAL

## 2024-02-14 ENCOUNTER — PRE-ADMISSION TESTING (OUTPATIENT)
Dept: ADMISSIONS | Facility: MEDICAL CENTER | Age: 49
End: 2024-02-14
Attending: NURSE PRACTITIONER
Payer: COMMERCIAL

## 2024-02-16 ENCOUNTER — PATIENT MESSAGE (OUTPATIENT)
Dept: MEDICAL GROUP | Facility: LAB | Age: 49
End: 2024-02-16
Payer: COMMERCIAL

## 2024-02-17 NOTE — PROGRESS NOTES
Followed up on patient in regard to alternating tylenol and ibuprofen for pain. Patient states that today was not a good day and recognizes that she does have good days and bad days. Today was the worst bad day. Per patient. Pt felt well yesterday and was able to work the whole day and do chores. Advised patient that she may be feeling the effect from yesterday, today. Will update Adali SAWANT APRN of patient symptoms. Advised patient to continue to deep breath and cough intermittently throughout the day and continue pain management regimen per Adali SAWANT APRN. No other concerns at this time.

## 2024-02-20 RX ORDER — TRAZODONE HYDROCHLORIDE 50 MG/1
50 TABLET ORAL NIGHTLY PRN
Qty: 90 TABLET | Refills: 3 | Status: SHIPPED | OUTPATIENT
Start: 2024-02-20

## 2024-02-22 ENCOUNTER — APPOINTMENT (OUTPATIENT)
Dept: RADIOLOGY | Facility: MEDICAL CENTER | Age: 49
End: 2024-02-22
Attending: NURSE PRACTITIONER
Payer: COMMERCIAL

## 2024-02-22 DIAGNOSIS — R94.8 ABNORMAL POSITRON EMISSION TOMOGRAPHY (PET) SCAN: ICD-10-CM

## 2024-02-22 PROCEDURE — 76830 TRANSVAGINAL US NON-OB: CPT

## 2024-02-23 ENCOUNTER — APPOINTMENT (OUTPATIENT)
Dept: ADMISSIONS | Facility: MEDICAL CENTER | Age: 49
End: 2024-02-23
Attending: NURSE PRACTITIONER
Payer: COMMERCIAL

## 2024-02-23 ENCOUNTER — HOSPITAL ENCOUNTER (OUTPATIENT)
Dept: LAB | Facility: MEDICAL CENTER | Age: 49
End: 2024-02-23
Attending: OBSTETRICS & GYNECOLOGY
Payer: COMMERCIAL

## 2024-02-23 ENCOUNTER — HOSPITAL ENCOUNTER (OUTPATIENT)
Dept: LAB | Facility: MEDICAL CENTER | Age: 49
End: 2024-02-23
Attending: RADIOLOGY
Payer: COMMERCIAL

## 2024-02-23 DIAGNOSIS — Z01.812 PRE-OPERATIVE LABORATORY EXAMINATION: ICD-10-CM

## 2024-02-23 LAB
ERYTHROCYTE [DISTWIDTH] IN BLOOD BY AUTOMATED COUNT: 41.8 FL (ref 35.9–50)
ESTRADIOL SERPL-MCNC: 8.3 PG/ML
FSH SERPL-ACNC: 38.7 MIU/ML
HCT VFR BLD AUTO: 45.4 % (ref 37–47)
HGB BLD-MCNC: 15.2 G/DL (ref 12–16)
INR PPP: 0.99 (ref 0.87–1.13)
MCH RBC QN AUTO: 29.2 PG (ref 27–33)
MCHC RBC AUTO-ENTMCNC: 33.5 G/DL (ref 32.2–35.5)
MCV RBC AUTO: 87.1 FL (ref 81.4–97.8)
PLATELET # BLD AUTO: 241 K/UL (ref 164–446)
PMV BLD AUTO: 9.1 FL (ref 9–12.9)
PROTHROMBIN TIME: 13.2 SEC (ref 12–14.6)
RBC # BLD AUTO: 5.21 M/UL (ref 4.2–5.4)
T4 FREE SERPL-MCNC: 1.56 NG/DL (ref 0.93–1.7)
TSH SERPL DL<=0.005 MIU/L-ACNC: 0.82 UIU/ML (ref 0.38–5.33)
WBC # BLD AUTO: 6.1 K/UL (ref 4.8–10.8)

## 2024-02-23 PROCEDURE — 84443 ASSAY THYROID STIM HORMONE: CPT

## 2024-02-23 PROCEDURE — 36415 COLL VENOUS BLD VENIPUNCTURE: CPT

## 2024-02-23 PROCEDURE — 85027 COMPLETE CBC AUTOMATED: CPT

## 2024-02-23 PROCEDURE — 83001 ASSAY OF GONADOTROPIN (FSH): CPT

## 2024-02-23 PROCEDURE — 86800 THYROGLOBULIN ANTIBODY: CPT

## 2024-02-23 PROCEDURE — 84432 ASSAY OF THYROGLOBULIN: CPT

## 2024-02-23 PROCEDURE — 85610 PROTHROMBIN TIME: CPT

## 2024-02-23 PROCEDURE — 84439 ASSAY OF FREE THYROXINE: CPT

## 2024-02-23 PROCEDURE — 82670 ASSAY OF TOTAL ESTRADIOL: CPT

## 2024-02-26 ENCOUNTER — TELEPHONE (OUTPATIENT)
Dept: HEMATOLOGY ONCOLOGY | Facility: MEDICAL CENTER | Age: 49
End: 2024-02-26
Payer: COMMERCIAL

## 2024-02-27 NOTE — TELEPHONE ENCOUNTER
Called patient to update on US of pelvis, per Adali SAWANT APRN, Adali will contact patient over next couple days to go over results and answer any questions patient may have. Patient verbalizes agreement with plan of care. No other concerns at this time.

## 2024-02-29 LAB
APOB+LDLR+PCSK9 GENE MUT ANL BLD/T: NOT DETECTED
BRCA1+BRCA2 DEL+DUP + FULL MUT ANL BLD/T: NOT DETECTED
MLH1+MSH2+MSH6+PMS2 GN DEL+DUP+FUL M: NOT DETECTED

## 2024-03-05 ENCOUNTER — APPOINTMENT (OUTPATIENT)
Dept: RADIOLOGY | Facility: MEDICAL CENTER | Age: 49
End: 2024-03-05
Attending: RADIOLOGY
Payer: COMMERCIAL

## 2024-03-05 ENCOUNTER — HOSPITAL ENCOUNTER (OUTPATIENT)
Facility: MEDICAL CENTER | Age: 49
End: 2024-03-05
Attending: INTERNAL MEDICINE | Admitting: INTERNAL MEDICINE
Payer: COMMERCIAL

## 2024-03-05 ENCOUNTER — APPOINTMENT (OUTPATIENT)
Dept: RADIOLOGY | Facility: MEDICAL CENTER | Age: 49
End: 2024-03-05
Attending: NURSE PRACTITIONER
Payer: COMMERCIAL

## 2024-03-05 VITALS
HEART RATE: 70 BPM | WEIGHT: 233.25 LBS | TEMPERATURE: 97.4 F | RESPIRATION RATE: 18 BRPM | BODY MASS INDEX: 37.49 KG/M2 | HEIGHT: 66 IN | SYSTOLIC BLOOD PRESSURE: 135 MMHG | DIASTOLIC BLOOD PRESSURE: 81 MMHG | OXYGEN SATURATION: 95 %

## 2024-03-05 DIAGNOSIS — R91.1 LUNG NODULE: ICD-10-CM

## 2024-03-05 LAB — HCG UR QL: NEGATIVE

## 2024-03-05 PROCEDURE — 160047 HCHG PACU  - EA ADDL 30 MINS PHASE II

## 2024-03-05 PROCEDURE — 71045 X-RAY EXAM CHEST 1 VIEW: CPT

## 2024-03-05 PROCEDURE — 99153 MOD SED SAME PHYS/QHP EA: CPT

## 2024-03-05 PROCEDURE — 160002 HCHG RECOVERY MINUTES (STAT)

## 2024-03-05 PROCEDURE — 700111 HCHG RX REV CODE 636 W/ 250 OVERRIDE (IP)

## 2024-03-05 PROCEDURE — 160046 HCHG PACU - 1ST 60 MINS PHASE II

## 2024-03-05 PROCEDURE — 700102 HCHG RX REV CODE 250 W/ 637 OVERRIDE(OP): Performed by: RADIOLOGY

## 2024-03-05 PROCEDURE — 700111 HCHG RX REV CODE 636 W/ 250 OVERRIDE (IP): Performed by: RADIOLOGY

## 2024-03-05 PROCEDURE — 81025 URINE PREGNANCY TEST: CPT

## 2024-03-05 PROCEDURE — A9270 NON-COVERED ITEM OR SERVICE: HCPCS | Performed by: RADIOLOGY

## 2024-03-05 PROCEDURE — 160035 HCHG PACU - 1ST 60 MINS PHASE I

## 2024-03-05 RX ORDER — MIDAZOLAM HYDROCHLORIDE 1 MG/ML
INJECTION INTRAMUSCULAR; INTRAVENOUS
Status: COMPLETED
Start: 2024-03-05 | End: 2024-03-05

## 2024-03-05 RX ORDER — HYDROMORPHONE HYDROCHLORIDE 1 MG/ML
0.5 INJECTION, SOLUTION INTRAMUSCULAR; INTRAVENOUS; SUBCUTANEOUS
Status: DISCONTINUED | OUTPATIENT
Start: 2024-03-05 | End: 2024-03-05 | Stop reason: HOSPADM

## 2024-03-05 RX ORDER — OXYCODONE HYDROCHLORIDE 5 MG/1
5 TABLET ORAL
Status: DISCONTINUED | OUTPATIENT
Start: 2024-03-05 | End: 2024-03-05 | Stop reason: HOSPADM

## 2024-03-05 RX ORDER — MIDAZOLAM HYDROCHLORIDE 1 MG/ML
.5-2 INJECTION INTRAMUSCULAR; INTRAVENOUS PRN
Status: ACTIVE | OUTPATIENT
Start: 2024-03-05 | End: 2024-03-05

## 2024-03-05 RX ORDER — ONDANSETRON 2 MG/ML
4 INJECTION INTRAMUSCULAR; INTRAVENOUS PRN
Status: ACTIVE | OUTPATIENT
Start: 2024-03-05 | End: 2024-03-05

## 2024-03-05 RX ORDER — SODIUM CHLORIDE 9 MG/ML
500 INJECTION, SOLUTION INTRAVENOUS
Status: ACTIVE | OUTPATIENT
Start: 2024-03-05 | End: 2024-03-05

## 2024-03-05 RX ORDER — SODIUM CHLORIDE 9 MG/ML
INJECTION, SOLUTION INTRAVENOUS CONTINUOUS
Status: DISCONTINUED | OUTPATIENT
Start: 2024-03-05 | End: 2024-03-05 | Stop reason: HOSPADM

## 2024-03-05 RX ADMIN — MIDAZOLAM HYDROCHLORIDE 2 MG: 1 INJECTION INTRAMUSCULAR; INTRAVENOUS at 08:43

## 2024-03-05 RX ADMIN — OXYCODONE 5 MG: 5 TABLET ORAL at 12:08

## 2024-03-05 RX ADMIN — MIDAZOLAM HYDROCHLORIDE 2 MG: 2 INJECTION, SOLUTION INTRAMUSCULAR; INTRAVENOUS at 08:43

## 2024-03-05 RX ADMIN — HYDROMORPHONE HYDROCHLORIDE 0.5 MG: 1 INJECTION, SOLUTION INTRAMUSCULAR; INTRAVENOUS; SUBCUTANEOUS at 09:52

## 2024-03-05 RX ADMIN — FENTANYL CITRATE 50 MCG: 50 INJECTION, SOLUTION INTRAMUSCULAR; INTRAVENOUS at 08:43

## 2024-03-05 ASSESSMENT — PAIN DESCRIPTION - PAIN TYPE
TYPE: SURGICAL PAIN
TYPE: ACUTE PAIN

## 2024-03-05 ASSESSMENT — FIBROSIS 4 INDEX: FIB4 SCORE: 0.58

## 2024-03-05 NOTE — PROGRESS NOTES
Pt presents to CT 4. Patient was consented by MD at bedside, confirmed by this RN and consent at bedside. Pt transferred to CT table in supine and then move to prone position. Patient underwent a Right lower lobe lung biopsy by Dr. Weathers. Procedure site was marked by MD and verified using imaging guidance. Pt placed on monitor, prepped and draped in a sterile fashion. Vitals were taken every 5 minutes and remained stable during procedure (see doc flow sheet for results). CO2 waveform capnography was monitored and remained WNL throughout procedure. No specimen was able to be obtained by Dr. Weathers Report called to Zackary SHEPPARD. Pt transported by stretcher with RN to PPU.

## 2024-03-05 NOTE — PROGRESS NOTES
0930: [place]. Biopsy sites assessed displaying small sanginous drainage to the posterior site, yet remains soft with no s/s of hematoma. Anterior site CDI with no s/s of hematoma. VSS. WHO form signed.    2379 Diliaudid given for pain control. Family informed of patient's arrival.    1010 Placed patient on 1 L O2 post dilaudid administration. She dropped to 89% on RA.    1045 1st Post cxr completed.     1150 Pt ambulated to bathroom and voided. Biopsy sites unchanged.     1215 Pt tolerating PO fluids.    1251 2nd Post cxr completed.     1321 Discharge instructions given to patient and family both verbalized understanding. Copy of instructions given to family.    1339 Escorted via w/c to responsible adult.

## 2024-03-05 NOTE — OR SURGEON
Immediate Post- Operative Note        Findings: RLL lung nodule      Procedure(s): attempted RLL lung biopsy  Not feasible due to respiratory and gross movement as well as position and habitus      Estimated Blood Loss: Less than 5 ml        Complications: None            3/5/2024     9:31 AM     Fredy Weathers M.D.

## 2024-03-05 NOTE — DISCHARGE INSTRUCTIONS
Needle Biopsy, Care After  Keep dressing clean dry intact for 24 hours. Okay to remove the dressing after 24 hrs. Okay to shower after 24 hours.  These instructions tell you how to care for yourself after your procedure. Your doctor may give you more instructions. Call your doctor if you have any problems or questions.  What can I expect after the procedure?  After a needle biopsy, it is common to have these things at the puncture site:  Soreness.  Bruising.  Mild pain.  These things should go away after a few days.  Follow these instructions at home:  Puncture site care  Wash your hands with soap and water for at least 20 seconds before and after you change your bandage (dressing). If you cannot use soap and water, use hand .  Follow instructions from your doctor about how to take care of your puncture site. This includes:  When and how to change your bandage.  When to take off your bandage.  Check your puncture site every day for signs of infection. Check for:  Redness, swelling, or more pain.  Fluid or blood.  Warmth.  Pus or a bad smell.  General instructions  Go back to your normal activities when your doctor says that it is safe. Ask if there is anything you cannot do as you heal.  Take over-the-counter and prescription medicines only as told by your doctor.  Do not take baths, swim, or use a hot tub. Ask your doctor when you can shower.  Keep all follow-up visits. Ask if you need an appointment to get your biopsy results.  Contact a doctor if:  You have a fever.  You have redness, swelling, or more pain at the puncture site, and it lasts longer than a few days.  You have fluid, blood, or pus coming from the puncture site.  Your puncture site feels warm.  Get help right away if:  You have very bad bleeding from the puncture site.  Summary  After the procedure, it is common to have soreness, bruising, or mild pain at the puncture site.  Check your puncture site every day for signs of infection, such as  redness, swelling, or more pain.  Get help right away if you have very bad bleeding from your puncture site.  This information is not intended to replace advice given to you by your health care provider. Make sure you discuss any questions you have with your health care provider.  Document Revised: 06/08/2022 Document Reviewed: 06/08/2022  Elsevier Patient Education © 2023 [employer]

## 2024-03-07 ENCOUNTER — HOSPITAL ENCOUNTER (OUTPATIENT)
Dept: HEMATOLOGY ONCOLOGY | Facility: MEDICAL CENTER | Age: 49
End: 2024-03-07
Attending: NURSE PRACTITIONER
Payer: COMMERCIAL

## 2024-03-07 VITALS
BODY MASS INDEX: 37.36 KG/M2 | HEART RATE: 71 BPM | HEIGHT: 66 IN | OXYGEN SATURATION: 96 % | RESPIRATION RATE: 17 BRPM | WEIGHT: 232.47 LBS | DIASTOLIC BLOOD PRESSURE: 80 MMHG | SYSTOLIC BLOOD PRESSURE: 118 MMHG | TEMPERATURE: 97.7 F

## 2024-03-07 DIAGNOSIS — R94.8 ABNORMAL POSITRON EMISSION TOMOGRAPHY (PET) SCAN: ICD-10-CM

## 2024-03-07 DIAGNOSIS — R91.1 LUNG NODULE: ICD-10-CM

## 2024-03-07 DIAGNOSIS — Z85.850 HISTORY OF THYROID CANCER: ICD-10-CM

## 2024-03-07 PROCEDURE — 99212 OFFICE O/P EST SF 10 MIN: CPT | Performed by: NURSE PRACTITIONER

## 2024-03-07 PROCEDURE — 99214 OFFICE O/P EST MOD 30 MIN: CPT | Performed by: NURSE PRACTITIONER

## 2024-03-07 ASSESSMENT — FIBROSIS 4 INDEX: FIB4 SCORE: 0.58

## 2024-03-07 ASSESSMENT — ENCOUNTER SYMPTOMS
COUGH: 1
NERVOUS/ANXIOUS: 1
CHILLS: 0
SHORTNESS OF BREATH: 0
FEVER: 0
FLANK PAIN: 1

## 2024-03-07 ASSESSMENT — PAIN SCALES - GENERAL: PAINLEVEL: 2=MINIMAL-SLIGHT

## 2024-03-07 NOTE — ADDENDUM NOTE
Encounter addended by: Nati Wadsworth, Med Ass't on: 3/7/2024 12:42 PM   Actions taken: Charge Capture section accepted

## 2024-03-07 NOTE — PROGRESS NOTES
Subjective     Ifrah Brunson is a 48 y.o. female who presents with Results (C est/ follow up after lung BX )          HPI    Patient seen today in follow-up for imaging and biopsy results.  She presents accompanied by her mother and  for today's visit.    Patient originally seen back on 1/18/2024 after referral from PCP for lung nodule. Patient established with gastroenterology physician due to abdominal complaints.  She was seen by Dr. Moreno who ordered a CT abdomen and pelvis completed on 12/26/2023.  Patient noted to have 3 hypodense lesions within the liver noted.  They did recommend MRI for follow-up as a consideration.  However incidental finding did note a 10.3 mm nodule in the right lung base.  She also did have splenomegaly on the CT scan as well as scattered colon diverticula.  She then had an MRI completed on 1/15/2024.  Confirmed that the 3 lesions on the CT scan were benign cavernous hemangiomas.  MRI did not show splenomegaly at this time.      Patient with a significant medical history. She was diagnosed with thyroid cancer in 2012. According to the patient it was stage II. She underwent a thyroidectomy with her ENT physician. She also did have a left neck dissection. She was also seen by Dr. Farooq and endocrinologist at that time. She stated that they did not believe she needed to undergo I-131 radiation. She had follow-up thyroid uptake scans since her diagnosis and have been found to be negative. In April 2023 patient was noted to have enlargement of neck lymph nodes on the right side. She did undergo biopsy at [facility] at that time. Lymph nodes were evaluated and no evidence of malignancy or papillary carcinoma on FNA at that time.      Based on imaging findings PET/CT was recommended which was completed on 2/2/2024.  PET/CT does show that the right lower lobe lung nodule does have hypermetabolic activity with an SUV of 3.97.  Reading radiologist stated that this is  concerning for malignancy.  No abnormal activity in the left lung or mediastinum.  Was also noted that patient had focal increased activity in the right ovary concerning for malignancy.  PET scan did confirm that the multiple large hypodense liver lesions were non-PET avid consistent with known hemangiomas.    Patient has had previous evaluation of the pelvis.  Her CT abdomen and pelvis with contrast on 12/26/2023 noted that the uterus and adnexal structures were not well-delineated.  Per patient she did have a transvaginal pelvic ultrasound back on 5/12/2021 which did note multiple uterine leiomyomas, but also noted a simple cyst on the right ovary that measured 1.8 cm in size.  Patient's gynecologist is in [place] N[place].     Interval history  Based on PET/CT findings I did recommend biopsy of the lung nodule.  I also recommended a complete pelvic ultrasound.    Ultrasound completed on 2/22/2024 showed no right ovarian mass or cyst identified.  There were 3 heterogeneous masses within the fundus, 2 of which have possibly enlarged since her previous examination.  According to the radiologist this all consists of fibroids.  The endometrial stripe was not well-delineated.  I did discuss these results in detail with the patient today.  She did state that she had her endometrium layer removed previously.  Patient did state that she is scheduled to meet with her gynecologist in April 2024.  There has been conversation in the past of possible hysterectomy.    CT-guided biopsy of the lung on 3/5/2024 was attempted but unfortunately was unsuccessful.  According to the interventional radiologist due to combination of factors, he was unable to perform the biopsy as it was technically not feasible.  Patient was aware as she did have a conversation with the interventional radiologist at that time.  She stated that she is doing well.  She does have some residual tenderness, as well as a cough from the procedure.   She denies any hemoptysis.  Lung sounds were clear on auscultation today.    Patient does state that she continues to have the right upper quadrant/flank pain.  She is using heating pad and over-the-counter medications as needed.  Uncertain if this pain is related to the lung nodule.    Patient also did confirm that she recently underwent an ultrasound of her thyroid for her follow-up for her history of thyroid cancer.  She does have stable, subcentimeter lymph nodes bilaterally with no concerning findings for any disease progression or metastatic adenopathy noted.      Allergies   Allergen Reactions    Latex Rash     rash    Morphine Hives and Itching     Current Outpatient Medications on File Prior to Encounter   Medication Sig Dispense Refill    traZODone (DESYREL) 50 MG Tab Take 1 Tablet by mouth at bedtime as needed for Sleep. 90 Tablet 3    loperamide (IMODIUM) 2 MG Cap Take 1 Capsule by mouth 4 times a day as needed for Diarrhea. 15 Capsule 0    buPROPion (WELLBUTRIN XL) 300 MG XL tablet Take 1 Tablet by mouth every morning. 90 Tablet 3    pantoprazole (PROTONIX) 40 MG Tablet Delayed Response Take 1 Tablet by mouth every day. 90 Tablet 3    liothyronine (CYTOMEL) 5 MCG Tab Take 5 mcg by mouth every morning.      levothyroxine (SYNTHROID) 175 MCG Tab Take 175 mcg by mouth see administration instructions. Hold on Sunday, take all other days      dicyclomine (BENTYL) 10 MG Cap Take 10 mg by mouth every 6 hours as needed (abddomen cramping).      cetirizine (ZYRTEC) 10 MG Tab Take 10 mg by mouth every morning.       No current facility-administered medications on file prior to encounter.         Review of Systems   Constitutional:  Negative for chills and fever.   Respiratory:  Positive for cough. Negative for shortness of breath.    Genitourinary:  Positive for flank pain.   Psychiatric/Behavioral:  The patient is nervous/anxious.               Objective     /80 (BP Location: Right arm, Patient Position:  "Sitting, BP Cuff Size: Adult)   Pulse 71   Temp 36.5 °C (97.7 °F) (Temporal)   Resp 17   Ht 1.676 m (5' 5.98\")   Wt 105 kg (232 lb 7.6 oz)   SpO2 96%   BMI 37.54 kg/m²      Physical Exam  Vitals reviewed.   Constitutional:       General: She is not in acute distress.     Appearance: Normal appearance. She is not diaphoretic.   HENT:      Head: Normocephalic and atraumatic.   Cardiovascular:      Rate and Rhythm: Normal rate and regular rhythm.      Heart sounds: Normal heart sounds. No murmur heard.     No friction rub. No gallop.   Pulmonary:      Effort: Pulmonary effort is normal. No respiratory distress.      Breath sounds: Normal breath sounds. No wheezing.   Musculoskeletal:         General: No swelling or tenderness. Normal range of motion.   Skin:     General: Skin is warm and dry.   Neurological:      Mental Status: She is alert and oriented to person, place, and time.   Psychiatric:         Mood and Affect: Mood normal.         Behavior: Behavior normal.            CT-BIOPSY-LUNG/MEDIASTINUM W/GUIDE RIGHT    Result Date: 3/5/2024  HISTORY/REASON FOR EXAM:  48-year-old woman with history of thyroid cancer and an FDG avid RIGHT lower lobe pulmonary nodule TECHNIQUE/EXAM DESCRIPTION: Attempted CT-guided RIGHT lung biopsy Low dose optimization technique was utilized for this CT exam including automated exposure control and adjustment of the mA and/or kV according to patient size. COMPARISON: PET/CT from 2/2/2024 DOSE: 2062.4 mGy-cm^2 MEDICATIONS: Moderate sedation was provided. Pulse oximetry and continuous cardiac monitoring by the nurse was performed throughout the exam. Intraservice time was 45 minutes. PROCEDURE: The risks, benefits, goals and objectives and alternatives were discussed. Risks were specified as including but not limited to pneumothorax, bleeding, infection, damage to vessels or nerves, pain and discomfort as well as nondiagnosis. The patient's questions were answered. Informed oral " and written consent were obtained. The patient was initially positioned supine on the CT gantry. Initial CT imaging was performed and a site for percutaneously accessing the target lesion was selected and marked. The skin was prepped and draped in the usual sterile manner. A timeout was performed. Local anesthetic result was achieved with administration of 1% lidocaine. Using CT fluoroscopic guidance a 17-gauge guiding needle was advanced towards the target location. Due to a combination of factors including lesion position, patient habitus, respiratory motion and gross motion this was abandoned before the pleural surface was traversed. The patient was switched to a supine position. Local anesthetic result was achieved with administration of 1% lidocaine. Using CT fluoroscopic guidance a 17-gauge guiding needle was advanced towards the target location. I did traverse the pleura and appear to have the needle at the margin of the lesion. 2 specimens were attempted with a coaxially placed Corvocet guiding needle set to a 10 mm throw. This did not yield any tissue. Repositioning was attempted but it was clear that I was encountering the same difficulties as previously now somewhat exacerbated by increasing patient discomfort. Based on apparent futility of additional attempts the procedure was terminated. Completion scanning was performed. The patient tolerated the procedure well with no evidence of complication.  The skin was cleaned and a dressing was applied. FINDINGS: RIGHT pulmonary nodule redemonstrated. No evidence of pneumothorax or significant alveolar hemorrhage at conclusion.     Attempted but unsuccessful RIGHT lung biopsy. Due to the combination of factors involved it is not clear that this will be technically feasible. It might be feasible with anesthesia which increases the risk of pneumothorax. This was discussed with the patient. She voiced her understanding.    US-PELVIC COMPLETE  (TRANSABDOMINAL/TRANSVAGINAL) (COMBO)    Result Date: 2/22/2024 2/22/2024 8:32 AM HISTORY/REASON FOR EXAM:  increased activity on PET CT of the right ovary posteriorly - patient had U/S 5/12/21 (films uploaded for comparison) showed simple cyst of right ovary at that time TECHNIQUE/EXAM DESCRIPTION: Transabdominal and transvaginal pelvic ultrasound. COMPARISON:   5/12/2021 FINDINGS: Both transabdominal and transvaginal scanning were performed to optimally visualize the pelvis. UTERUS: The uterus measures 4.93 cm x 7.86 cm x 5.81 cm. The uterine myometrium is heterogeneous. There are 3 heterogeneous masses within the fundus of the uterus. Largest measures 1.8 x 2.1 x 2.2 cm. Previously measured 1.6 x 2.1 x 2.7 cm. There is a 1.6 x 1.8 x 1.7 cm mass within the right fundus of the uterus. Previously measured 0.9 x 0.7 x 0.7 cm. There is a 1.9 x 1.8 x 2.3 cm mass within the left fundus of the uterus. Not previously delineated. The endometrial echo complex measures is not delineated. History of endometrial ablation. OVARIES: The right ovary measures 3.29 cm x 1.52 cm x 2.00 cm. Duplex Doppler examination of the right ovary shows normal waveforms. No right ovarian mass or large cyst is identified. The left ovary measures 1.80 cm x 1.90 cm x 2.40 cm. Duplex Doppler examination of the left ovary shows normal waveforms. No left ovarian mass or large cyst is identified. There is no free fluid seen.     1.  No right ovarian mass or cyst identified. 2.  There are 3 heterogeneous masses within the fundus 2 of which may have enlarged since previous examination. Consistent with fibroids. 3.  Endometrial stripe is not delineated.    US-THYROID    Result Date: 2/22/2024  EXAM: Thyroid sonogram. TECHNIQUE: High resolution sonographic imaging of the thyroid gland was performed. HISTORY: Thyroid Cancer Residual Disease/Reocurrence Monitoring COMPARISON: July 14, 2023 FINDINGS:   Post thyroidectomy. No thyroid bed masses. Other: Lymph  node survey demonstrates subcentimeter short axis nodes, bilaterally. No concerning or pathologic enlargement, especially compared to previous exams.    1.  Stable, subcentimeter lymph nodes bilaterally. No concerning disease progression or metastatic adenopathy in the interval. Recommendations are based on the American College of Radiology Thyroid Imaging, Reporting and Data System (TI-RADS) committee recommendations. TI-RADS management recommendations: TR1: Benign (0 pts). No FNA or follow up. TR2: Not suspicious (2 pts). No FNA or follow up. TR3: Mildly suspicious (3 pts). FNA if nodule at least 2.5 cm; follow if at least 1.5 cm. TR4: Moderately suspicious (4-6 pts). FNA if nodule at least 1.5 cm; follow if at least 1 cm. TR5: Highly suspicious (7 or more pts). FNA if greater than 1 cm; follow if at least 0.5 cm. Electronically Signed by: Lexx Valladares MD 2/22/2024 1:13 PM         CT-[id number] BASE TO MID-THIGH     Result Date: 2/2/2024 2/2/2024 2:08 PM HISTORY/REASON FOR EXAM:  RLL Lung nodule - hx of thyroidectomy from cancer TECHNIQUE/EXAM DESCRIPTION AND NUMBER OF VIEWS: PET body imaging. Initially, 9.654 mCi F-18 FDG was administered intravenously under standardized conditions. Approximately 45 minutes after FDG administration, the patient was placed in the supine position on the PET CT table. Blood glucose level was 87 mg/dL. Low dose spiral CT imaging was performed from the skull base to the mid thighs. PET imaging was then performed from the skull base to the mid thighs. CT images, PET images, and PET/CT fused images were reviewed on a PACS 3D workstation. The limited non-contrast CT data are used primarily for attenuation correction and anatomic correlation.  Evaluation of solid organs and bowel are especially limited utilizing this technique. COMPARISON: CT abdomen and pelvis 12/26/2023 FINDINGS: Head and neck: No abnormal focal FDG activity. Chest: Small focus of increased activity at the RIGHT lung  base corresponding to RIGHT lower lobe nodule on CT, max SUV 3.97.  No abnormal activity in the LEFT lung or mediastinum. Abdomen and pelvis: No abnormal activity in the liver or spleen.  Expected urinary activity.  Physiologic bowel uptake.  Probable physiologic activity in both ovaries.  Focal intense uptake in the RIGHT posterior pelvis likely corresponding to ovary. Musculoskeletal: No abnormal focal FDG activity. Incidental findings on CT: RIGHT lower lobe lung nodule measuring 1 cm.  Heterogeneous low-density liver lesions again seen without PET correlate.      1.  Hypermetabolic nodule in the basal RIGHT lower lobe corresponding to CT findings, consistent with malignancy which may be primary or metastatic. 2.  Focal increased uptake within the RIGHT ovary posteriorly concerning for metastatic disease in this patient with history of thyroid cancer. 3.  No PET correlate for multiple large hypodense liver lesions consistent with known hemangiomas.     MR-ABDOMEN-WITH & W/O     Result Date: 1/15/2024  1/15/2024 1:33 PM HISTORY/REASON FOR EXAM:  Follow-up liver lesions TECHNIQUE/EXAM DESCRIPTION: MRI of the liver with dynamic IV gadolinium enhancement. MR imaging of the liver was performed. MR images of the liver were obtained with coronal single-shot fast spin-echo T2, fat-suppressed axial FRFSE T2, axial in-phase and out-of-phase FSPGR T1, precontrast fat-suppressed FSPGR T1, dynamic gadolinium enhanced axial fat-suppressed T1 FSPGR in the arterial dominant, portal venous, and 2-minute and 4-minute delayed phases, with delayed coronal fat-suppressed T1 FSPGR sequence.. The study was performed on a [employer] 1.5 Gela MRI scanner. 10 mL ProHance contrast was administered intravenously. COMPARISON: CT 12/26/2023. FINDINGS: Liver: There are 3 hemangiomas in the liver. The largest measures 7.7 x 6.1 cm. Bile Ducts: No intrahepatic or extrahepatic bile duct dilation or lesion seen. Spleen: Normal in size without  focal lesion. Gallbladder: Absent. Pancreas and Pancreatic Duct: Normal. No pancreatic duct dilation is seen. Kidneys: Normal. Adrenal Glands: Normal. Ascites: Not present. Lymph Nodes: No abdominal lymphadenopathy is seen. Visualized Bowel: Grossly normal. Bones: No suspicious osseous lesions are noted.      1. The 3 lesions seen on CT are benign cavernous hemangiomas. The largest measures 7.7 cm. No suspicious liver lesion identified.        CT-ABDOMEN-PELVIS WITH     Result Date: 12/26/2023 12/26/2023 3:07 PM HISTORY/REASON FOR EXAM:  Other. Change in bowel habits. Diarrhea. TECHNIQUE/EXAM DESCRIPTION:   CT scan of the abdomen and pelvis with contrast. Contrast-enhanced helical scanning was obtained from the diaphragmatic domes through the pubic symphysis following the bolus administration of nonionic contrast without complication. 100 mL of Omnipaque 350 nonionic contrast was administered without complication. Low dose optimization technique was utilized for this CT exam including automated exposure control and adjustment of the mA and/or kV according to patient size. COMPARISON: No prior studies available. FINDINGS: Lower Chest: There is a 10.3 mm nodule base of the right lower lobe image 19.. Liver: There is a 7.0 cm lobulated hypodense lesion within the dome of the left lobe of the liver that measures 6.2 cm delayed contrast imaging. There is a 5.6 cm hypodense lesion anterior right lobe of the liver that measures 4.4 cm delayed contrast imaging. There is a 2.8 cm hypodense lesion inferior right lobe of liver that measures 2.5 cm delayed contrast imaging. There appears to be peripheral puddling of contrast within all 3 lesions especially within the 7.0 cm dome of the liver lesion. These findings are consistent with cavernous hemangiomas. Spleen: Spleen is enlarged measuring 14.1 cm AP diameter.. Pancreas: No pancreatic mass. Gallbladder: The gallbladder is not delineated.. Biliary: Nondilated. Adrenal  glands: No interval gland mass.. Kidneys: No hydronephrosis.. Bowel: No evidence of bowel obstruction or acute appendicitis. There are scattered diverticula colon. No evidence of diverticulitis.. Lymph nodes: No adenopathy. Vasculature: Units opacified and patent. Portal venous system is opacified and patent.. Peritoneum: Unremarkable without ascites. Musculoskeletal: No acute or destructive process. Pelvis: No focal urinary bladder wall thickening.. Uterus and adnexal structures are not well delineated. No pelvic free fluid.      1.  10.3 mm nodule within the right lung base. 2.  There are 3 hypodense lesions within liver that demonstrate peripheral puddling of contrast on delayed contrast imaging consistent with cavernous hemangiomas. Depending on clinical findings, MRI follow-up is a consideration. 3.  Splenomegaly. 4.  Scattered colon diverticula. No evidence diverticulitis. No free fluid. Single nodule greater than 8 mm: Fleischner Society pulmonary nodule recommendations: Low and High Risk: Consider CT at 3 months, PET/CT, or tissue sampling. Low Risk - Minimal or absent history of smoking and of other known risk factors. High Risk - History of smoking or of other known risk factors. Note: These recommendations do not apply to lung cancer screening, patients with immunosuppression, or patients with known primary cancer. Fleischner Society 2017 Guidelines for Management of Incidentally Detected Pulmonary Nodules in Adults             Assessment & Plan       1. Lung nodule  PULMONARY FUNCTION TESTS -Test requested: Complete Pulmonary Function Test    Referral to General Surgery      2. Abnormal positron emission tomography (PET) scan        3. History of thyroid cancer                1.  Patient with a lung nodule noted in the right lung base.  It was metabolically active on PET scan concerning for possible malignancy.  She is status post attempt for CT-guided biopsy but unfortunately was technically not  feasible to obtain a tissue diagnosis.  I presented patient's case at the lung tumor board this morning and did discuss the case in detail with the thoracic surgeons.  It was decided that it would be reasonable to proceed with surgical intervention.  Surgeons have agreed to see the patient.    Therefore I placed a referral to [facility] to be seen either by Dr. Ganser or Dr. Taylor.  In preparation for possible surgical intervention I have also requested PFTs.    I discussed this recommendation in detail with the patient and her family today and she did verbalize understanding's and agreement with the plan.    2.  Patient with an abnormal finding on PET/CT in the right pelvis that had uptake in the ovary posteriorly.  Imaging showed no right ovarian mass or cyst identified.  She did have 3 heterogeneous masses within the fundus which were consistent with fibroids.  According to the radiologist it appears that these have enlarged since previous examination in May 2021.  Patient did state that she has an appointment with her gynecologist this coming April 2024 for further discussion.    3.  Does have a history of thyroid cancer currently being followed and monitored by her endocrinologist.  She recently did undergo a thyroid ultrasound which shows very stable subcentimeter nodules with no evidence of recurrence or metastatic disease.  Will continue to defer to the endocrinology at this time.      At this time there is no further follow-up needed with [facility].       Please note that this dictation was created using voice recognition software. I have made every reasonable attempt to correct obvious errors, but I expect that there are errors of grammar and possibly content that I did not discover before finalizing the note.

## 2024-03-09 ENCOUNTER — HOSPITAL ENCOUNTER (OUTPATIENT)
Dept: PULMONOLOGY | Facility: MEDICAL CENTER | Age: 49
End: 2024-03-09
Attending: NURSE PRACTITIONER
Payer: COMMERCIAL

## 2024-03-09 DIAGNOSIS — R91.1 LUNG NODULE: ICD-10-CM

## 2024-03-09 PROCEDURE — 94726 PLETHYSMOGRAPHY LUNG VOLUMES: CPT | Mod: 26 | Performed by: INTERNAL MEDICINE

## 2024-03-09 PROCEDURE — 94729 DIFFUSING CAPACITY: CPT | Mod: 26 | Performed by: INTERNAL MEDICINE

## 2024-03-09 PROCEDURE — 94729 DIFFUSING CAPACITY: CPT

## 2024-03-09 PROCEDURE — 94060 EVALUATION OF WHEEZING: CPT

## 2024-03-09 PROCEDURE — 94726 PLETHYSMOGRAPHY LUNG VOLUMES: CPT

## 2024-03-09 PROCEDURE — 94060 EVALUATION OF WHEEZING: CPT | Mod: 26 | Performed by: INTERNAL MEDICINE

## 2024-03-09 RX ADMIN — Medication 2.5 MG: at 07:06

## 2024-03-09 ASSESSMENT — PULMONARY FUNCTION TESTS
FVC_PERCENT_PREDICTED: 103
FEV1/FVC_PERCENT_CHANGE: 100
FEV1_PERCENT_CHANGE: 8
FVC: 3.6
FEV1/FVC_PERCENT_LLN: 67
FEV1_PREDICTED: 3.01
FEV1_LLN: 2.51
FVC_LLN: 3.14
FEV1_LLN: 2.51
FEV1/FVC: 84
FEV1/FVC_PERCENT_PREDICTED: 103
FEV1_PERCENT_PREDICTED: 99
FVC_PREDICTED: 3.76
FVC_LLN: 3.14
FEV1/FVC: 83.85
FEV1: 3.27
FEV1/FVC_PERCENT_CHANGE: 0
FEV1/FVC_PERCENT_PREDICTED: 104
FEV1/FVC_PERCENT_PREDICTED: 103
FVC_PERCENT_PREDICTED: 95
FVC: 3.9
FEV1/FVC: 83
FEV1_PERCENT_CHANGE: 8
FEV1/FVC_PERCENT_PREDICTED: 105
FEV1/FVC_PERCENT_PREDICTED: 80
FEV1/FVC: 84
FEV1/FVC_PREDICTED: 81
FEV1/FVC_PERCENT_LLN: 67
FEV1: 3.01
FEV1_PERCENT_PREDICTED: 108

## 2024-03-11 NOTE — PROCEDURES
DATE OF SERVICE:  03/09/2024     PULMONARY FUNCTION TEST INTERPRETATION     INTERPRETING PHYSICIAN:  Delores Drake MD     REASON FOR STUDY:  Lung nodule.     RESULTS:  SPIROMETRY:  FVC is 3.60 liters, which is 95% predicted without significant change   postbronchodilator.  FEV1 is 2.01 liters, which is 99% predicted without significant change   postbronchodilator.  FEV1/FVC is 84.     LUNG VOLUMES:  TLC is 6.18 liters, which is 115% predicted.  RV is 2.49 liters, which is 134% predicted.     DIFFUSION CAPACITY:  DLCO is 30.53, which is 139% predicted.  DL/VA is 5.68, which is 139% predicted.     INTERPRETATION:  1.  Spirometry is normal.  2.  There is no significant change postbronchodilator.  There is a suggestion   of bronchoreactivity in the mid flow values, which can be seen in reactive   airways disease, correlate clinically.  3.  The flow volume loop is consistent with the spirometry data.  4.  Lung volumes are normal.  5.  Diffusion capacity is mildly elevated, which can be seen in asthma,   hyperinflation, obesity and left to right heart shunt, correlate clinically.  6.  There are no prior studies for comparison.        ______________________________  Delores Drake MD    L/NICO    DD:  03/10/2024 18:55  DT:  03/10/2024 19:10    Job#:  [id number]

## 2024-03-15 ENCOUNTER — HOSPITAL ENCOUNTER (OUTPATIENT)
Facility: MEDICAL CENTER | Age: 49
DRG: 165 | End: 2024-03-15
Attending: STUDENT IN AN ORGANIZED HEALTH CARE EDUCATION/TRAINING PROGRAM | Admitting: STUDENT IN AN ORGANIZED HEALTH CARE EDUCATION/TRAINING PROGRAM
Payer: COMMERCIAL

## 2024-03-15 DIAGNOSIS — R91.1 SOLITARY LUNG NODULE: ICD-10-CM

## 2024-03-18 ENCOUNTER — APPOINTMENT (OUTPATIENT)
Dept: ADMISSIONS | Facility: MEDICAL CENTER | Age: 49
End: 2024-03-18
Attending: STUDENT IN AN ORGANIZED HEALTH CARE EDUCATION/TRAINING PROGRAM
Payer: COMMERCIAL

## 2024-03-26 ENCOUNTER — PRE-ADMISSION TESTING (OUTPATIENT)
Dept: ADMISSIONS | Facility: MEDICAL CENTER | Age: 49
End: 2024-03-26
Attending: STUDENT IN AN ORGANIZED HEALTH CARE EDUCATION/TRAINING PROGRAM
Payer: COMMERCIAL

## 2024-03-27 ENCOUNTER — PRE-ADMISSION TESTING (OUTPATIENT)
Dept: ADMISSIONS | Facility: MEDICAL CENTER | Age: 49
DRG: 165 | End: 2024-03-27
Attending: STUDENT IN AN ORGANIZED HEALTH CARE EDUCATION/TRAINING PROGRAM
Payer: COMMERCIAL

## 2024-03-27 DIAGNOSIS — Z01.810 PRE-OPERATIVE CARDIOVASCULAR EXAMINATION: ICD-10-CM

## 2024-03-27 DIAGNOSIS — Z01.812 PRE-OPERATIVE LABORATORY EXAMINATION: ICD-10-CM

## 2024-03-27 LAB
ANION GAP SERPL CALC-SCNC: 12 MMOL/L (ref 7–16)
BUN SERPL-MCNC: 17 MG/DL (ref 8–22)
CALCIUM SERPL-MCNC: 9.7 MG/DL (ref 8.5–10.5)
CHLORIDE SERPL-SCNC: 104 MMOL/L (ref 96–112)
CO2 SERPL-SCNC: 22 MMOL/L (ref 20–33)
CREAT SERPL-MCNC: 0.67 MG/DL (ref 0.5–1.4)
ERYTHROCYTE [DISTWIDTH] IN BLOOD BY AUTOMATED COUNT: 41.8 FL (ref 35.9–50)
GFR SERPLBLD CREATININE-BSD FMLA CKD-EPI: 107 ML/MIN/1.73 M 2
GLUCOSE SERPL-MCNC: 98 MG/DL (ref 65–99)
HCT VFR BLD AUTO: 45.6 % (ref 37–47)
HGB BLD-MCNC: 15.5 G/DL (ref 12–16)
MCH RBC QN AUTO: 29.2 PG (ref 27–33)
MCHC RBC AUTO-ENTMCNC: 34 G/DL (ref 32.2–35.5)
MCV RBC AUTO: 85.9 FL (ref 81.4–97.8)
PLATELET # BLD AUTO: 276 K/UL (ref 164–446)
PMV BLD AUTO: 9.1 FL (ref 9–12.9)
POTASSIUM SERPL-SCNC: 4.7 MMOL/L (ref 3.6–5.5)
RBC # BLD AUTO: 5.31 M/UL (ref 4.2–5.4)
SODIUM SERPL-SCNC: 138 MMOL/L (ref 135–145)
WBC # BLD AUTO: 8.9 K/UL (ref 4.8–10.8)

## 2024-03-27 PROCEDURE — 93005 ELECTROCARDIOGRAM TRACING: CPT

## 2024-03-27 PROCEDURE — 80048 BASIC METABOLIC PNL TOTAL CA: CPT

## 2024-03-27 PROCEDURE — 85027 COMPLETE CBC AUTOMATED: CPT

## 2024-03-27 PROCEDURE — 36415 COLL VENOUS BLD VENIPUNCTURE: CPT

## 2024-03-28 LAB — EKG IMPRESSION: NORMAL

## 2024-03-28 PROCEDURE — 93010 ELECTROCARDIOGRAM REPORT: CPT | Performed by: INTERNAL MEDICINE

## 2024-04-01 ENCOUNTER — ANESTHESIA (OUTPATIENT)
Dept: SURGERY | Facility: MEDICAL CENTER | Age: 49
DRG: 165 | End: 2024-04-01
Payer: COMMERCIAL

## 2024-04-01 ENCOUNTER — ANESTHESIA EVENT (OUTPATIENT)
Dept: SURGERY | Facility: MEDICAL CENTER | Age: 49
DRG: 165 | End: 2024-04-01
Payer: COMMERCIAL

## 2024-04-01 ENCOUNTER — APPOINTMENT (OUTPATIENT)
Dept: RADIOLOGY | Facility: MEDICAL CENTER | Age: 49
DRG: 165 | End: 2024-04-01
Attending: STUDENT IN AN ORGANIZED HEALTH CARE EDUCATION/TRAINING PROGRAM
Payer: COMMERCIAL

## 2024-04-01 PROBLEM — R91.1 SOLITARY LUNG NODULE: Status: ACTIVE | Noted: 2024-04-01

## 2024-04-01 LAB
ABO GROUP BLD: NORMAL
BLD GP AB SCN SERPL QL: NORMAL
HCG SERPL QL: NEGATIVE
PATHOLOGY CONSULT NOTE: NORMAL
RH BLD: NORMAL

## 2024-04-01 PROCEDURE — 88341 IMHCHEM/IMCYTCHM EA ADD ANTB: CPT | Mod: 91

## 2024-04-01 PROCEDURE — 160029 HCHG SURGERY MINUTES - 1ST 30 MINS LEVEL 4: Performed by: STUDENT IN AN ORGANIZED HEALTH CARE EDUCATION/TRAINING PROGRAM

## 2024-04-01 PROCEDURE — 160041 HCHG SURGERY MINUTES - EA ADDL 1 MIN LEVEL 4: Performed by: STUDENT IN AN ORGANIZED HEALTH CARE EDUCATION/TRAINING PROGRAM

## 2024-04-01 PROCEDURE — 71045 X-RAY EXAM CHEST 1 VIEW: CPT

## 2024-04-01 PROCEDURE — A9270 NON-COVERED ITEM OR SERVICE: HCPCS | Performed by: ANESTHESIOLOGY

## 2024-04-01 PROCEDURE — 160002 HCHG RECOVERY MINUTES (STAT): Performed by: STUDENT IN AN ORGANIZED HEALTH CARE EDUCATION/TRAINING PROGRAM

## 2024-04-01 PROCEDURE — 700111 HCHG RX REV CODE 636 W/ 250 OVERRIDE (IP): Mod: JZ | Performed by: ANESTHESIOLOGY

## 2024-04-01 PROCEDURE — 160036 HCHG PACU - EA ADDL 30 MINS PHASE I: Performed by: STUDENT IN AN ORGANIZED HEALTH CARE EDUCATION/TRAINING PROGRAM

## 2024-04-01 PROCEDURE — 36415 COLL VENOUS BLD VENIPUNCTURE: CPT

## 2024-04-01 PROCEDURE — 88360 TUMOR IMMUNOHISTOCHEM/MANUAL: CPT

## 2024-04-01 PROCEDURE — 110371 HCHG SHELL REV 272: Performed by: STUDENT IN AN ORGANIZED HEALTH CARE EDUCATION/TRAINING PROGRAM

## 2024-04-01 PROCEDURE — 700101 HCHG RX REV CODE 250: Performed by: STUDENT IN AN ORGANIZED HEALTH CARE EDUCATION/TRAINING PROGRAM

## 2024-04-01 PROCEDURE — 700105 HCHG RX REV CODE 258: Performed by: STUDENT IN AN ORGANIZED HEALTH CARE EDUCATION/TRAINING PROGRAM

## 2024-04-01 PROCEDURE — 700111 HCHG RX REV CODE 636 W/ 250 OVERRIDE (IP): Performed by: ANESTHESIOLOGY

## 2024-04-01 PROCEDURE — 770001 HCHG ROOM/CARE - MED/SURG/GYN PRIV*

## 2024-04-01 PROCEDURE — 160009 HCHG ANES TIME/MIN: Performed by: STUDENT IN AN ORGANIZED HEALTH CARE EDUCATION/TRAINING PROGRAM

## 2024-04-01 PROCEDURE — 86900 BLOOD TYPING SEROLOGIC ABO: CPT

## 2024-04-01 PROCEDURE — A9270 NON-COVERED ITEM OR SERVICE: HCPCS | Performed by: STUDENT IN AN ORGANIZED HEALTH CARE EDUCATION/TRAINING PROGRAM

## 2024-04-01 PROCEDURE — 700102 HCHG RX REV CODE 250 W/ 637 OVERRIDE(OP): Performed by: ANESTHESIOLOGY

## 2024-04-01 PROCEDURE — 88307 TISSUE EXAM BY PATHOLOGIST: CPT

## 2024-04-01 PROCEDURE — 700101 HCHG RX REV CODE 250: Performed by: ANESTHESIOLOGY

## 2024-04-01 PROCEDURE — 160035 HCHG PACU - 1ST 60 MINS PHASE I: Performed by: STUDENT IN AN ORGANIZED HEALTH CARE EDUCATION/TRAINING PROGRAM

## 2024-04-01 PROCEDURE — 86901 BLOOD TYPING SEROLOGIC RH(D): CPT

## 2024-04-01 PROCEDURE — C1729 CATH, DRAINAGE: HCPCS | Performed by: STUDENT IN AN ORGANIZED HEALTH CARE EDUCATION/TRAINING PROGRAM

## 2024-04-01 PROCEDURE — 88305 TISSUE EXAM BY PATHOLOGIST: CPT

## 2024-04-01 PROCEDURE — 700111 HCHG RX REV CODE 636 W/ 250 OVERRIDE (IP): Performed by: STUDENT IN AN ORGANIZED HEALTH CARE EDUCATION/TRAINING PROGRAM

## 2024-04-01 PROCEDURE — 07B74ZX EXCISION OF THORAX LYMPHATIC, PERCUTANEOUS ENDOSCOPIC APPROACH, DIAGNOSTIC: ICD-10-PCS | Performed by: STUDENT IN AN ORGANIZED HEALTH CARE EDUCATION/TRAINING PROGRAM

## 2024-04-01 PROCEDURE — 84703 CHORIONIC GONADOTROPIN ASSAY: CPT

## 2024-04-01 PROCEDURE — 160048 HCHG OR STATISTICAL LEVEL 1-5: Performed by: STUDENT IN AN ORGANIZED HEALTH CARE EDUCATION/TRAINING PROGRAM

## 2024-04-01 PROCEDURE — 86850 RBC ANTIBODY SCREEN: CPT

## 2024-04-01 PROCEDURE — 88342 IMHCHEM/IMCYTCHM 1ST ANTB: CPT

## 2024-04-01 PROCEDURE — 0BBF4ZZ EXCISION OF RIGHT LOWER LUNG LOBE, PERCUTANEOUS ENDOSCOPIC APPROACH: ICD-10-PCS | Performed by: STUDENT IN AN ORGANIZED HEALTH CARE EDUCATION/TRAINING PROGRAM

## 2024-04-01 PROCEDURE — 700102 HCHG RX REV CODE 250 W/ 637 OVERRIDE(OP): Performed by: STUDENT IN AN ORGANIZED HEALTH CARE EDUCATION/TRAINING PROGRAM

## 2024-04-01 RX ORDER — SODIUM CHLORIDE, SODIUM LACTATE, POTASSIUM CHLORIDE, CALCIUM CHLORIDE 600; 310; 30; 20 MG/100ML; MG/100ML; MG/100ML; MG/100ML
INJECTION, SOLUTION INTRAVENOUS CONTINUOUS
Status: ACTIVE | OUTPATIENT
Start: 2024-04-01 | End: 2024-04-01

## 2024-04-01 RX ORDER — HYDROMORPHONE HYDROCHLORIDE 1 MG/ML
0.1 INJECTION, SOLUTION INTRAMUSCULAR; INTRAVENOUS; SUBCUTANEOUS
Status: DISCONTINUED | OUTPATIENT
Start: 2024-04-01 | End: 2024-04-01 | Stop reason: HOSPADM

## 2024-04-01 RX ORDER — METOCLOPRAMIDE HYDROCHLORIDE 5 MG/ML
INJECTION INTRAMUSCULAR; INTRAVENOUS PRN
Status: DISCONTINUED | OUTPATIENT
Start: 2024-04-01 | End: 2024-04-02 | Stop reason: SURG

## 2024-04-01 RX ORDER — MEPERIDINE HYDROCHLORIDE 25 MG/ML
12.5 INJECTION INTRAMUSCULAR; INTRAVENOUS; SUBCUTANEOUS
Status: DISCONTINUED | OUTPATIENT
Start: 2024-04-01 | End: 2024-04-01 | Stop reason: HOSPADM

## 2024-04-01 RX ORDER — BUPROPION HYDROCHLORIDE 150 MG/1
300 TABLET, EXTENDED RELEASE ORAL DAILY
Status: DISCONTINUED | OUTPATIENT
Start: 2024-04-02 | End: 2024-04-03 | Stop reason: HOSPADM

## 2024-04-01 RX ORDER — OXYCODONE HYDROCHLORIDE 5 MG/1
10 TABLET ORAL
Status: DISCONTINUED | OUTPATIENT
Start: 2024-04-01 | End: 2024-04-03 | Stop reason: HOSPADM

## 2024-04-01 RX ORDER — KETOROLAC TROMETHAMINE 15 MG/ML
15 INJECTION, SOLUTION INTRAMUSCULAR; INTRAVENOUS EVERY 6 HOURS
Qty: 12 ML | Refills: 0 | Status: DISCONTINUED | OUTPATIENT
Start: 2024-04-01 | End: 2024-04-03 | Stop reason: HOSPADM

## 2024-04-01 RX ORDER — LIOTHYRONINE SODIUM 5 UG/1
5 TABLET ORAL EVERY MORNING
Status: DISCONTINUED | OUTPATIENT
Start: 2024-04-02 | End: 2024-04-03 | Stop reason: HOSPADM

## 2024-04-01 RX ORDER — BUPIVACAINE HYDROCHLORIDE AND EPINEPHRINE 5; 5 MG/ML; UG/ML
INJECTION, SOLUTION EPIDURAL; INTRACAUDAL; PERINEURAL
Status: DISCONTINUED | OUTPATIENT
Start: 2024-04-01 | End: 2024-04-01 | Stop reason: HOSPADM

## 2024-04-01 RX ORDER — ENOXAPARIN SODIUM 100 MG/ML
40 INJECTION SUBCUTANEOUS DAILY
Status: DISCONTINUED | OUTPATIENT
Start: 2024-04-02 | End: 2024-04-03 | Stop reason: HOSPADM

## 2024-04-01 RX ORDER — TRAZODONE HYDROCHLORIDE 50 MG/1
50 TABLET ORAL NIGHTLY PRN
Status: DISCONTINUED | OUTPATIENT
Start: 2024-04-01 | End: 2024-04-03 | Stop reason: HOSPADM

## 2024-04-01 RX ORDER — OXYCODONE HCL 5 MG/5 ML
10 SOLUTION, ORAL ORAL
Status: COMPLETED | OUTPATIENT
Start: 2024-04-01 | End: 2024-04-01

## 2024-04-01 RX ORDER — HYDROMORPHONE HYDROCHLORIDE 1 MG/ML
0.2 INJECTION, SOLUTION INTRAMUSCULAR; INTRAVENOUS; SUBCUTANEOUS
Status: DISCONTINUED | OUTPATIENT
Start: 2024-04-01 | End: 2024-04-01 | Stop reason: HOSPADM

## 2024-04-01 RX ORDER — OMEPRAZOLE 20 MG/1
20 CAPSULE, DELAYED RELEASE ORAL DAILY
Status: DISCONTINUED | OUTPATIENT
Start: 2024-04-02 | End: 2024-04-03 | Stop reason: HOSPADM

## 2024-04-01 RX ORDER — METOPROLOL TARTRATE 1 MG/ML
INJECTION, SOLUTION INTRAVENOUS PRN
Status: DISCONTINUED | OUTPATIENT
Start: 2024-04-01 | End: 2024-04-02 | Stop reason: HOSPADM

## 2024-04-01 RX ORDER — IBUPROFEN 800 MG/1
800 TABLET ORAL 3 TIMES DAILY PRN
Status: DISCONTINUED | OUTPATIENT
Start: 2024-04-04 | End: 2024-04-03 | Stop reason: HOSPADM

## 2024-04-01 RX ORDER — ACETAMINOPHEN 500 MG
1000 TABLET ORAL 3 TIMES DAILY PRN
COMMUNITY

## 2024-04-01 RX ORDER — DIPHENHYDRAMINE HYDROCHLORIDE 50 MG/ML
12.5 INJECTION INTRAMUSCULAR; INTRAVENOUS
Status: DISCONTINUED | OUTPATIENT
Start: 2024-04-01 | End: 2024-04-01 | Stop reason: HOSPADM

## 2024-04-01 RX ORDER — SODIUM CHLORIDE, SODIUM LACTATE, POTASSIUM CHLORIDE, CALCIUM CHLORIDE 600; 310; 30; 20 MG/100ML; MG/100ML; MG/100ML; MG/100ML
INJECTION, SOLUTION INTRAVENOUS CONTINUOUS
Status: DISCONTINUED | OUTPATIENT
Start: 2024-04-01 | End: 2024-04-02

## 2024-04-01 RX ORDER — HALOPERIDOL 5 MG/ML
1 INJECTION INTRAMUSCULAR
Status: DISCONTINUED | OUTPATIENT
Start: 2024-04-01 | End: 2024-04-01 | Stop reason: HOSPADM

## 2024-04-01 RX ORDER — HYDROMORPHONE HYDROCHLORIDE 1 MG/ML
0.4 INJECTION, SOLUTION INTRAMUSCULAR; INTRAVENOUS; SUBCUTANEOUS
Status: DISCONTINUED | OUTPATIENT
Start: 2024-04-01 | End: 2024-04-01 | Stop reason: HOSPADM

## 2024-04-01 RX ORDER — LIDOCAINE HYDROCHLORIDE 20 MG/ML
INJECTION, SOLUTION EPIDURAL; INFILTRATION; INTRACAUDAL; PERINEURAL PRN
Status: DISCONTINUED | OUTPATIENT
Start: 2024-04-01 | End: 2024-04-02 | Stop reason: SURG

## 2024-04-01 RX ORDER — ONDANSETRON 2 MG/ML
4 INJECTION INTRAMUSCULAR; INTRAVENOUS
Status: DISCONTINUED | OUTPATIENT
Start: 2024-04-01 | End: 2024-04-01 | Stop reason: HOSPADM

## 2024-04-01 RX ORDER — DIPHENHYDRAMINE HYDROCHLORIDE 50 MG/ML
25 INJECTION INTRAMUSCULAR; INTRAVENOUS EVERY 6 HOURS PRN
Status: DISCONTINUED | OUTPATIENT
Start: 2024-04-01 | End: 2024-04-03 | Stop reason: HOSPADM

## 2024-04-01 RX ORDER — ONDANSETRON 2 MG/ML
4 INJECTION INTRAMUSCULAR; INTRAVENOUS EVERY 4 HOURS PRN
Status: DISCONTINUED | OUTPATIENT
Start: 2024-04-01 | End: 2024-04-03 | Stop reason: HOSPADM

## 2024-04-01 RX ORDER — DIPHENHYDRAMINE HCL 25 MG
25 TABLET ORAL EVERY 6 HOURS PRN
Status: DISCONTINUED | OUTPATIENT
Start: 2024-04-01 | End: 2024-04-03 | Stop reason: HOSPADM

## 2024-04-01 RX ORDER — CEFAZOLIN SODIUM 1 G/3ML
INJECTION, POWDER, FOR SOLUTION INTRAMUSCULAR; INTRAVENOUS PRN
Status: DISCONTINUED | OUTPATIENT
Start: 2024-04-01 | End: 2024-04-02 | Stop reason: SURG

## 2024-04-01 RX ORDER — MIDAZOLAM HYDROCHLORIDE 1 MG/ML
INJECTION INTRAMUSCULAR; INTRAVENOUS PRN
Status: DISCONTINUED | OUTPATIENT
Start: 2024-04-01 | End: 2024-04-02 | Stop reason: SURG

## 2024-04-01 RX ORDER — OXYCODONE HCL 5 MG/5 ML
5 SOLUTION, ORAL ORAL
Status: COMPLETED | OUTPATIENT
Start: 2024-04-01 | End: 2024-04-01

## 2024-04-01 RX ORDER — IBUPROFEN 200 MG
600 TABLET ORAL EVERY 8 HOURS PRN
COMMUNITY

## 2024-04-01 RX ORDER — OXYCODONE HYDROCHLORIDE 5 MG/1
5 TABLET ORAL
Status: DISCONTINUED | OUTPATIENT
Start: 2024-04-01 | End: 2024-04-03 | Stop reason: HOSPADM

## 2024-04-01 RX ORDER — LABETALOL HYDROCHLORIDE 5 MG/ML
INJECTION, SOLUTION INTRAVENOUS PRN
Status: DISCONTINUED | OUTPATIENT
Start: 2024-04-01 | End: 2024-04-02 | Stop reason: SURG

## 2024-04-01 RX ORDER — DEXAMETHASONE SODIUM PHOSPHATE 4 MG/ML
INJECTION, SOLUTION INTRA-ARTICULAR; INTRALESIONAL; INTRAMUSCULAR; INTRAVENOUS; SOFT TISSUE PRN
Status: DISCONTINUED | OUTPATIENT
Start: 2024-04-01 | End: 2024-04-02 | Stop reason: SURG

## 2024-04-01 RX ORDER — FLUTICASONE PROPIONATE 50 MCG
1 SPRAY, SUSPENSION (ML) NASAL
COMMUNITY

## 2024-04-01 RX ORDER — LEVOTHYROXINE SODIUM 175 UG/1
175 TABLET ORAL
Status: DISCONTINUED | OUTPATIENT
Start: 2024-04-02 | End: 2024-04-03 | Stop reason: HOSPADM

## 2024-04-01 RX ORDER — ONDANSETRON 2 MG/ML
INJECTION INTRAMUSCULAR; INTRAVENOUS PRN
Status: DISCONTINUED | OUTPATIENT
Start: 2024-04-01 | End: 2024-04-02 | Stop reason: SURG

## 2024-04-01 RX ORDER — HYDROMORPHONE HYDROCHLORIDE 1 MG/ML
0.5 INJECTION, SOLUTION INTRAMUSCULAR; INTRAVENOUS; SUBCUTANEOUS
Status: DISCONTINUED | OUTPATIENT
Start: 2024-04-01 | End: 2024-04-03 | Stop reason: HOSPADM

## 2024-04-01 RX ADMIN — SUGAMMADEX 200 MG: 100 INJECTION, SOLUTION INTRAVENOUS at 14:05

## 2024-04-01 RX ADMIN — ONDANSETRON 4 MG: 2 INJECTION INTRAMUSCULAR; INTRAVENOUS at 12:50

## 2024-04-01 RX ADMIN — MIDAZOLAM HYDROCHLORIDE 2 MG: 1 INJECTION, SOLUTION INTRAMUSCULAR; INTRAVENOUS at 12:07

## 2024-04-01 RX ADMIN — KETOROLAC TROMETHAMINE 15 MG: 15 INJECTION, SOLUTION INTRAMUSCULAR; INTRAVENOUS at 17:14

## 2024-04-01 RX ADMIN — SODIUM CHLORIDE, POTASSIUM CHLORIDE, SODIUM LACTATE AND CALCIUM CHLORIDE: 600; 310; 30; 20 INJECTION, SOLUTION INTRAVENOUS at 10:19

## 2024-04-01 RX ADMIN — KETOROLAC TROMETHAMINE 15 MG: 15 INJECTION, SOLUTION INTRAMUSCULAR; INTRAVENOUS at 14:40

## 2024-04-01 RX ADMIN — OXYCODONE HYDROCHLORIDE 10 MG: 5 SOLUTION ORAL at 14:21

## 2024-04-01 RX ADMIN — DEXAMETHASONE SODIUM PHOSPHATE 10 MG: 4 INJECTION INTRA-ARTICULAR; INTRALESIONAL; INTRAMUSCULAR; INTRAVENOUS; SOFT TISSUE at 12:50

## 2024-04-01 RX ADMIN — HYDROMORPHONE HYDROCHLORIDE 0.4 MG: 1 INJECTION, SOLUTION INTRAMUSCULAR; INTRAVENOUS; SUBCUTANEOUS at 14:49

## 2024-04-01 RX ADMIN — FENTANYL CITRATE 50 MCG: 50 INJECTION, SOLUTION INTRAMUSCULAR; INTRAVENOUS at 14:25

## 2024-04-01 RX ADMIN — OXYCODONE 5 MG: 5 TABLET ORAL at 17:40

## 2024-04-01 RX ADMIN — HYDROMORPHONE HYDROCHLORIDE 0.5 MG: 1 INJECTION, SOLUTION INTRAMUSCULAR; INTRAVENOUS; SUBCUTANEOUS at 18:30

## 2024-04-01 RX ADMIN — METOCLOPRAMIDE 10 MG: 5 INJECTION, SOLUTION INTRAMUSCULAR; INTRAVENOUS at 12:50

## 2024-04-01 RX ADMIN — ONDANSETRON 4 MG: 2 INJECTION INTRAMUSCULAR; INTRAVENOUS at 19:45

## 2024-04-01 RX ADMIN — KETOROLAC TROMETHAMINE 15 MG: 15 INJECTION, SOLUTION INTRAMUSCULAR; INTRAVENOUS at 23:06

## 2024-04-01 RX ADMIN — SUGAMMADEX 100 MG: 100 INJECTION, SOLUTION INTRAVENOUS at 14:12

## 2024-04-01 RX ADMIN — HYDROMORPHONE HYDROCHLORIDE 0.4 MG: 1 INJECTION, SOLUTION INTRAMUSCULAR; INTRAVENOUS; SUBCUTANEOUS at 15:01

## 2024-04-01 RX ADMIN — LIDOCAINE HYDROCHLORIDE 100 MG: 20 INJECTION, SOLUTION EPIDURAL; INFILTRATION; INTRACAUDAL at 12:10

## 2024-04-01 RX ADMIN — OXYCODONE 10 MG: 5 TABLET ORAL at 20:54

## 2024-04-01 RX ADMIN — DIPHENHYDRAMINE HYDROCHLORIDE 25 MG: 50 INJECTION, SOLUTION INTRAMUSCULAR; INTRAVENOUS at 20:52

## 2024-04-01 RX ADMIN — SODIUM CHLORIDE, POTASSIUM CHLORIDE, SODIUM LACTATE AND CALCIUM CHLORIDE: 600; 310; 30; 20 INJECTION, SOLUTION INTRAVENOUS at 17:24

## 2024-04-01 RX ADMIN — CEFAZOLIN 2 G: 1 INJECTION, POWDER, FOR SOLUTION INTRAMUSCULAR; INTRAVENOUS at 12:07

## 2024-04-01 RX ADMIN — ROCURONIUM BROMIDE 100 MG: 10 INJECTION, SOLUTION INTRAVENOUS at 12:10

## 2024-04-01 RX ADMIN — PROPOFOL 150 MG: 10 INJECTION, EMULSION INTRAVENOUS at 12:10

## 2024-04-01 RX ADMIN — FENTANYL CITRATE 50 MCG: 50 INJECTION, SOLUTION INTRAMUSCULAR; INTRAVENOUS at 14:21

## 2024-04-01 RX ADMIN — LABETALOL HYDROCHLORIDE 10 MG: 5 INJECTION, SOLUTION INTRAVENOUS at 12:24

## 2024-04-01 RX ADMIN — METOPROLOL TARTRATE 5 MG: 5 INJECTION INTRAVENOUS at 12:18

## 2024-04-01 RX ADMIN — FENTANYL CITRATE 100 MCG: 50 INJECTION, SOLUTION INTRAMUSCULAR; INTRAVENOUS at 12:07

## 2024-04-01 ASSESSMENT — PAIN DESCRIPTION - PAIN TYPE
TYPE: SURGICAL PAIN
TYPE: ACUTE PAIN
TYPE: SURGICAL PAIN
TYPE: ACUTE PAIN

## 2024-04-01 ASSESSMENT — FIBROSIS 4 INDEX: FIB4 SCORE: 0.52

## 2024-04-01 ASSESSMENT — LIFESTYLE VARIABLES
HOW MANY TIMES IN THE PAST YEAR HAVE YOU HAD 5 OR MORE DRINKS IN A DAY: 0
HAVE PEOPLE ANNOYED YOU BY CRITICIZING YOUR DRINKING: NO
TOTAL SCORE: 0
CONSUMPTION TOTAL: NEGATIVE
DOES PATIENT WANT TO STOP DRINKING: NO
TOTAL SCORE: 0
EVER FELT BAD OR GUILTY ABOUT YOUR DRINKING: NO
ON A TYPICAL DAY WHEN YOU DRINK ALCOHOL HOW MANY DRINKS DO YOU HAVE: 0
EVER HAD A DRINK FIRST THING IN THE MORNING TO STEADY YOUR NERVES TO GET RID OF A HANGOVER: NO
ALCOHOL_USE: NO
TOTAL SCORE: 0
HAVE YOU EVER FELT YOU SHOULD CUT DOWN ON YOUR DRINKING: NO
AVERAGE NUMBER OF DAYS PER WEEK YOU HAVE A DRINK CONTAINING ALCOHOL: 0

## 2024-04-01 ASSESSMENT — PATIENT HEALTH QUESTIONNAIRE - PHQ9
2. FEELING DOWN, DEPRESSED, IRRITABLE, OR HOPELESS: NOT AT ALL
1. LITTLE INTEREST OR PLEASURE IN DOING THINGS: NOT AT ALL
SUM OF ALL RESPONSES TO PHQ9 QUESTIONS 1 AND 2: 0

## 2024-04-01 ASSESSMENT — COGNITIVE AND FUNCTIONAL STATUS - GENERAL
TURNING FROM BACK TO SIDE WHILE IN FLAT BAD: A LITTLE
WALKING IN HOSPITAL ROOM: A LITTLE
TOILETING: A LITTLE
SUGGESTED CMS G CODE MODIFIER DAILY ACTIVITY: CK
MOVING FROM LYING ON BACK TO SITTING ON SIDE OF FLAT BED: A LITTLE
MOVING TO AND FROM BED TO CHAIR: A LITTLE
STANDING UP FROM CHAIR USING ARMS: A LITTLE
SUGGESTED CMS G CODE MODIFIER MOBILITY: CK
DAILY ACTIVITIY SCORE: 18
HELP NEEDED FOR BATHING: A LITTLE
PERSONAL GROOMING: A LITTLE
DRESSING REGULAR LOWER BODY CLOTHING: A LITTLE
CLIMB 3 TO 5 STEPS WITH RAILING: A LITTLE
MOBILITY SCORE: 18
EATING MEALS: A LITTLE
DRESSING REGULAR UPPER BODY CLOTHING: A LITTLE

## 2024-04-01 NOTE — ANESTHESIA PREPROCEDURE EVALUATION
Case: [id number] Date/Time: 04/01/24 1130    Procedure: RIGHT THORACOSCOPIC WEDGE RESECTION    Pre-op diagnosis: SOLITARY NODULE OF LUNG    Location: [facility] OR 09 / SURGERY [facility]    Surgeons: Kei Taylor M.D.            Relevant Problems   NEURO   (positive) Migraines      CARDIAC   (positive) Migraines      GI   (positive) Gastroesophageal reflux disease without esophagitis      ENDO   (positive) Post-surgical hypothyroidism       Physical Exam    Anesthesia Plan    ASA 3       Plan - general       Airway plan will be ETT          Induction: intravenous    Postoperative Plan: Postoperative administration of opioids is intended.    Pertinent diagnostic labs and testing reviewed    Informed Consent:    Anesthetic plan and risks discussed with patient.    Use of blood products discussed with: patient whom consented to blood products.

## 2024-04-01 NOTE — PROGRESS NOTES
Medication history reviewed with PT at bedside    Med rec is complete per PT reporting    Allergies reviewed.     Patient denies any outpatient antibiotics in the last 30 days.     Patient is not taking anticoagulants.    Preferred pharmacy for this visit - [facility] ([phone])

## 2024-04-01 NOTE — PROGRESS NOTES
"Patient admitted to room T432 via transport in [facility] from PACU at 1630.  Pt /78   Pulse 75   Temp 36.7 °C (98.1 °F) (Temporal)   Resp 18   Ht 1.676 m (5' 6\")   Wt 106 kg (234 lb 2.1 oz)   SpO2 95%   BMI 37.79 kg/m²      Patient reports pain at 4 on a scale of 0-10. Educated patient regarding pharmacologic and non pharmacologic modalities for pain management. Oriented to room call light and smoking policy.  Reviewed plan of care (equipment, incentive spirometer, sequential compression devices, medications, activity, diet, fall precautions, skin care, and pain) with patient and family. Welcome packet given and reviewed with patient, all questions answered. Education provided on oral hygiene program.    A&Ox4. Denies CP/SOB.  Tolerating regular diet. Denies N/V.  + void. Last BM PTA.  R sided chest tube to water seal  Pt ambulates with SBA and FWW.  All needs met at this time. Call light within reach. Pt calls appropriately. Bed low and locked, non skid socks in place. Hourly rounding in place.  "

## 2024-04-01 NOTE — ANESTHESIA PROCEDURE NOTES
Arterial Line    Performed by: Aleksandr Champion M.D.  Authorized by: Aleksandr Champion M.D.    Start Time:  4/1/2024 12:15 PM  Localization: surface landmarks    Patient Location:  OR  Indication: continuous blood pressure monitoring        Catheter Size:  20 G  Seldinger Technique?: Yes    Site:  Radial artery  Line Secured:  Antimicrobial disc, tape and transparent dressing  Events: patient tolerated procedure well with no complications

## 2024-04-01 NOTE — OR NURSING
Pt is aaox4, resting comfortably in [facility] on 10lpm oxymask per ERAS orders. Her respirations are equal and unlabored, she is in no acute distress. She is treated for pain per MAR with good response. She takes sips of water/juice without difficulty or complaints of n/v. Lung sounds clear upon auscultation with chest xray obtained. Dr. Taylor notified of chest xray reading without interventions at this time. Chest tube patent and water sealed. Will continue to monitor.    Pt informed on 3 separate occasions to be brought up to her room via wheelchair. The pt expresses she does not feel safe and wants to wait to get to her room. RN informs her the importance of getting out of bed early from this procedure and verbalizes understanding, refusing the wheelchair. She states she will get up once she gets to her room.     Pt's s/o updated on pt status, room # and plan of care with all questions answered.

## 2024-04-01 NOTE — ANESTHESIA PROCEDURE NOTES
Airway    Date/Time: 4/1/2024 12:10 PM    Performed by: Aleksandr Champion M.D.  Authorized by: Aleksandr Champion M.D.    Location:  OR  Urgency:  Elective  Indications for Airway Management:  Anesthesia      Spontaneous Ventilation: absent    Sedation Level:  Deep  Preoxygenated: Yes    Patient Position:  Sniffing  Final Airway Type:  Endotracheal airway  Final Endotracheal Airway:  ETT - double lumen left with ONE LUNG VENTILATION  Cuffed: Yes    Technique Used for Successful ETT Placement:  Direct laryngoscopy    Insertion Site:  Oral  Blade Type:  Moises  Laryngoscope Blade/Videolaryngoscope Blade Size:  3  ETT Double Lumen (fr):  37  Measured from:  Teeth  ETT to Teeth (cm):  31  Placement Verified by: auscultation and capnometry    Cormack-Lehane Classification:  Grade IIb - view of arytenoids or posterior of glottis only  Number of Attempts at Approach:  1

## 2024-04-01 NOTE — OP REPORT
Thoracic Surgery Operative Report    DATE OF OPERATION:    4/1/2024    PREOPERATIVE DIAGNOSIS:    Lung nodule    POSTOPERATIVE DIAGNOSIS:   Same    PROCEDURE PERFORMED:   1) Bronchoscopy (CPT 83176)  2) Left thoracoscopic wedge resection (CPT 03045)  3) Mediastinal lymph node dissection (CPT 94659)    SURGEON:      Kei Taylor M.D.    ASSISTANT:      BUZZ Myers    ANESTHESIOLOGIST:    Aleksandr Champion M.D.    ANESTHESIA:     Double lumen endotracheal tube general anesthesia.    INDICATIONS:    Ms. Ifrah Brunson is a 49 y.o. female who presents with a lung nodule of the right lower lobe. She is taken to the operating room for removal.    The complexity of the surgical procedure necessitated additional skilled operative assistance from a licensed Physician Assistant (HUMERA). The assistant was present during the entire operation. The surgical assistant performed the following: provided assistance with optimal surgical exposure of the operative field, provided high complexity, subspecialty decision making input, operated the camera for the thoracoscopic portion of the procedure, and performed the skin closure and dressing application.    FINDINGS:   There was a solitary lung nodule the anterior inferior right lower lobe which was a wedge resection.  There were no lymph nodes found in the inferior pulmonary ligament and around the esophagus.  There was a small station 10 lymph node in level 2 and 4 lymph nodes were normal..     SPECIMEN:       Right lower lobe lung nodule, station 2, 4,10 lymph nodes    ESTIMATED BLOOD LOSS:    30 mL.    DESCRIPTION OF PROCEDURE:    The patient was brought to the operating room and transferred to the operating table. General anesthesia was begun and the lung was isolated using a double lumen tube.  A final timeout was performed identifying the correct patient and procedure to be performed.  DVT prophylaxis antibiotic prophylaxis was appropriate.  A bronchoscopy  was performed while placing the double-lumen tube.  The tube was correctly positioned into the left mainstem.  There were no lesions located in the right or left mainstem bronchus, the lobar bronchi, or in the segmental bronchi on either side. There were no significant secretions on either side.    The patient was then positioned in the left lateral decubitus position. The right chest was prepped and draped in the normal sterile fashion. Local anesthetic was injected around the anterior 7th intercostal space. An incision was made just above the rib and a trocar inserted into the chest. A posterior port was then placed.  I placed an additional posterior inferior port and additional inferior anterior port and a posterior superior port.  I was able to grasp the lung and identify the nodule.  The nodule was resected using 3 loads of 60 mm stapler.  I then took down the inferior pulmonary ligament.  There were no lymph nodes identified in the ligament.  I then dissected out station 2 and 4 lymph nodes.  I then dissected out a station 10 lymph node     Thie wedge  was placed in an endocatch bag and removed from the chest. The chest was then inspected for hemostasis and was adequate. A 19 [place] black drain was placed posteriorly and towards the apex. The lung was inflated showing no major air leaks.      The chest tube was secured to the skin using 0 silk suture. Skin incisions were closed with a deep Vicryl and subcuticular Monocryl suture. Dermabond was applied to the skin. All counts were correct x 2. The patient was positioned supine and emerged from general anesthesia then taken to the recovery room in good condition. There were no complications with the case.      Kei Taylor MD  Thoracic & General Surgeon  [facility]  [phone]

## 2024-04-02 ENCOUNTER — APPOINTMENT (OUTPATIENT)
Dept: RADIOLOGY | Facility: MEDICAL CENTER | Age: 49
DRG: 165 | End: 2024-04-02
Attending: STUDENT IN AN ORGANIZED HEALTH CARE EDUCATION/TRAINING PROGRAM
Payer: COMMERCIAL

## 2024-04-02 LAB
ANION GAP SERPL CALC-SCNC: 12 MMOL/L (ref 7–16)
BUN SERPL-MCNC: 12 MG/DL (ref 8–22)
CALCIUM SERPL-MCNC: 8.8 MG/DL (ref 8.5–10.5)
CHLORIDE SERPL-SCNC: 100 MMOL/L (ref 96–112)
CO2 SERPL-SCNC: 22 MMOL/L (ref 20–33)
CREAT SERPL-MCNC: 0.61 MG/DL (ref 0.5–1.4)
ERYTHROCYTE [DISTWIDTH] IN BLOOD BY AUTOMATED COUNT: 42.2 FL (ref 35.9–50)
GFR SERPLBLD CREATININE-BSD FMLA CKD-EPI: 110 ML/MIN/1.73 M 2
GLUCOSE SERPL-MCNC: 111 MG/DL (ref 65–99)
HCT VFR BLD AUTO: 42.1 % (ref 37–47)
HGB BLD-MCNC: 14 G/DL (ref 12–16)
MCH RBC QN AUTO: 29.1 PG (ref 27–33)
MCHC RBC AUTO-ENTMCNC: 33.3 G/DL (ref 32.2–35.5)
MCV RBC AUTO: 87.5 FL (ref 81.4–97.8)
PLATELET # BLD AUTO: 273 K/UL (ref 164–446)
PMV BLD AUTO: 9.2 FL (ref 9–12.9)
POTASSIUM SERPL-SCNC: 4.4 MMOL/L (ref 3.6–5.5)
RBC # BLD AUTO: 4.81 M/UL (ref 4.2–5.4)
SODIUM SERPL-SCNC: 134 MMOL/L (ref 135–145)
WBC # BLD AUTO: 15.6 K/UL (ref 4.8–10.8)

## 2024-04-02 PROCEDURE — RXMED WILLOW AMBULATORY MEDICATION CHARGE: Performed by: STUDENT IN AN ORGANIZED HEALTH CARE EDUCATION/TRAINING PROGRAM

## 2024-04-02 PROCEDURE — 700102 HCHG RX REV CODE 250 W/ 637 OVERRIDE(OP): Performed by: STUDENT IN AN ORGANIZED HEALTH CARE EDUCATION/TRAINING PROGRAM

## 2024-04-02 PROCEDURE — 36415 COLL VENOUS BLD VENIPUNCTURE: CPT

## 2024-04-02 PROCEDURE — A9270 NON-COVERED ITEM OR SERVICE: HCPCS | Performed by: STUDENT IN AN ORGANIZED HEALTH CARE EDUCATION/TRAINING PROGRAM

## 2024-04-02 PROCEDURE — 71045 X-RAY EXAM CHEST 1 VIEW: CPT

## 2024-04-02 PROCEDURE — 80048 BASIC METABOLIC PNL TOTAL CA: CPT

## 2024-04-02 PROCEDURE — 770001 HCHG ROOM/CARE - MED/SURG/GYN PRIV*

## 2024-04-02 PROCEDURE — 700111 HCHG RX REV CODE 636 W/ 250 OVERRIDE (IP): Performed by: STUDENT IN AN ORGANIZED HEALTH CARE EDUCATION/TRAINING PROGRAM

## 2024-04-02 PROCEDURE — 85027 COMPLETE CBC AUTOMATED: CPT

## 2024-04-02 RX ORDER — OXYCODONE HYDROCHLORIDE AND ACETAMINOPHEN 5; 325 MG/1; MG/1
1 TABLET ORAL EVERY 4 HOURS PRN
Qty: 15 TABLET | Refills: 0 | Status: SHIPPED | OUTPATIENT
Start: 2024-04-02 | End: 2024-04-10

## 2024-04-02 RX ADMIN — LIOTHYRONINE SODIUM 5 MCG: 5 TABLET ORAL at 05:14

## 2024-04-02 RX ADMIN — ENOXAPARIN SODIUM 40 MG: 100 INJECTION SUBCUTANEOUS at 09:32

## 2024-04-02 RX ADMIN — BUPROPION HYDROCHLORIDE 300 MG: 150 TABLET, EXTENDED RELEASE ORAL at 05:12

## 2024-04-02 RX ADMIN — OXYCODONE 10 MG: 5 TABLET ORAL at 01:16

## 2024-04-02 RX ADMIN — KETOROLAC TROMETHAMINE 15 MG: 15 INJECTION, SOLUTION INTRAMUSCULAR; INTRAVENOUS at 12:10

## 2024-04-02 RX ADMIN — KETOROLAC TROMETHAMINE 15 MG: 15 INJECTION, SOLUTION INTRAMUSCULAR; INTRAVENOUS at 23:54

## 2024-04-02 RX ADMIN — OXYCODONE 10 MG: 5 TABLET ORAL at 05:12

## 2024-04-02 RX ADMIN — KETOROLAC TROMETHAMINE 15 MG: 15 INJECTION, SOLUTION INTRAMUSCULAR; INTRAVENOUS at 16:49

## 2024-04-02 RX ADMIN — LEVOTHYROXINE SODIUM 175 MCG: 0.17 TABLET ORAL at 05:14

## 2024-04-02 RX ADMIN — OXYCODONE 5 MG: 5 TABLET ORAL at 19:38

## 2024-04-02 RX ADMIN — OMEPRAZOLE 20 MG: 20 CAPSULE, DELAYED RELEASE ORAL at 05:12

## 2024-04-02 RX ADMIN — OXYCODONE 10 MG: 5 TABLET ORAL at 09:32

## 2024-04-02 RX ADMIN — KETOROLAC TROMETHAMINE 15 MG: 15 INJECTION, SOLUTION INTRAMUSCULAR; INTRAVENOUS at 05:12

## 2024-04-02 ASSESSMENT — PAIN DESCRIPTION - PAIN TYPE
TYPE: ACUTE PAIN

## 2024-04-02 NOTE — ANESTHESIA POSTPROCEDURE EVALUATION
Patient: Ifrah Brunson    Procedure Summary       Date: 04/01/24 Room / Location: [facility] OR 09 / SURGERY [facility]    Anesthesia Start: 1157 Anesthesia Stop: 1430    Procedures:       RIGHT THORACOSCOPIC WEDGE RESECTION (Right: Chest)      Chest Tube Placement (Right: Chest) Diagnosis: (SOLITARY NODULE OF LUNG)    Surgeons: Kei Taylor M.D. Responsible Provider: Aleksandr Champion M.D.    Anesthesia Type: general ASA Status: 3            Final Anesthesia Type: general  Last vitals  BP   Blood Pressure: 119/69    Temp   37.1 °C (98.7 °F)    Pulse   86   Resp   18    SpO2   92 %      Anesthesia Post Evaluation    Patient location during evaluation: PACU  Patient participation: complete - patient participated  Level of consciousness: awake and alert    Airway patency: patent  Anesthetic complications: no  Cardiovascular status: hemodynamically stable  Respiratory status: acceptable  Hydration status: euvolemic    PONV: none          No notable events documented.     Nurse Pain Score: 7 (NPRS)

## 2024-04-02 NOTE — PROGRESS NOTES
AA&Ox4. Denies CP/SOB.  Reporting 7/10 pain. Medicated per MAR.   Educated patient regarding pharmacologic and non pharmacologic modalities for pain management.  Skin per flowsheet.  Tolerating regular diet. Denies N/V.  + void. Last BM PTA.  Pt ambulates upself.  All needs met at this time. Call light within reach. Pt calls appropriately. Bed low and locked, non skid socks in place. Hourly rounding in place.

## 2024-04-02 NOTE — PROGRESS NOTES
4 Eyes Skin Assessment Completed     Head WDL  Ears WDL  Nose WDL  Mouth WDL  Neck WDL  Breast/Chest Redness and Incision, R chest incisions, R chest tube to water seal  Shoulder Blades WDL  Spine WDL  (R) Arm/Elbow/Hand WDL  (L) Arm/Elbow/Hand WDL  Abdomen WDL  Groin WDL  Scrotum/Coccyx/Buttocks WDL  (R) Leg WDL  (L) Leg WDL  (R) Heel/Foot/Toe WDL  (L) Heel/Foot/Toe WDL          Devices In Places Blood Pressure Cuff, Pulse Ox, and Oxy Mask      Interventions In Place Gray Ear Foams, Pillows, Low Air Loss Mattress, and Heels Loaded W/Pillows    Possible Skin Injury No    Pictures Uploaded Into Epic N/A  Wound Consult Placed N/A  RN Wound Prevention Protocol Ordered No

## 2024-04-02 NOTE — CARE PLAN
The patient is Watcher - Medium risk of patient condition declining or worsening    Shift Goals  Clinical Goals: pain mgmt  Patient Goals: pain mgmt    Progress made toward(s) clinical / shift goals:    Problem: Pain - Standard  Goal: Alleviation of pain or a reduction in pain to the patient’s comfort goal  Outcome: Progressing

## 2024-04-02 NOTE — CARE PLAN
The patient is Watcher - Medium risk of patient condition declining or worsening    Shift Goals  Clinical Goals: pain control, oob activity    Progress made toward(s) clinical / shift goals:  Pain managed per MAR. Patient ambulated to bathroom and sat in chair.       Problem: Pain - Standard  Goal: Alleviation of pain or a reduction in pain to the patient’s comfort goal  Description: Target End Date:  Prior to discharge or change in level of care    Document on Vitals flowsheet    1.  Document pain using the appropriate pain scale per order or unit policy  2.  Educate and implement non-pharmacologic comfort measures (i.e. relaxation, distraction, massage, cold/heat therapy, etc.)  3.  Pain management medications as ordered  4.  Reassess pain after pain med administration per policy  5.  If opiods administered assess patient's response to pain medication is appropriate per POSS sedation scale  6.  Follow pain management plan developed in collaboration with patient and interdisciplinary team (including palliative care or pain specialists if applicable)  Outcome: Progressing     Problem: Knowledge Deficit - Standard  Goal: Patient and family/care givers will demonstrate understanding of plan of care, disease process/condition, diagnostic tests and medications  Description: Target End Date:  1-3 days or as soon as patient condition allows    Document in Patient Education    1.  Patient and family/caregiver oriented to unit, equipment, visitation policy and means for communicating concern  2.  Complete/review Learning Assessment  3.  Assess knowledge level of disease process/condition, treatment plan, diagnostic tests and medications  4.  Explain disease process/condition, treatment plan, diagnostic tests and medications  Outcome: Progressing

## 2024-04-02 NOTE — ANESTHESIA TIME REPORT
Anesthesia Start and Stop Event Times       Date Time Event    4/1/2024 1127 Ready for Procedure     1157 Anesthesia Start     1430 Anesthesia Stop          Responsible Staff  04/01/24      Name Role Begin End    Aleksandr Champion M.D. Anesth 1157 1430          Overtime Reason:  no overtime (within assigned shift)    Comments:

## 2024-04-02 NOTE — PROGRESS NOTES
Report received from day shift RN, assumed Care.   Patient is AOx4, responds appropriately.      Pain controlled at this time.  Patient is tolerating regular diet, denies nausea/vomiting. + flatus. Chest tube to right flank to waterseal.   Up stand by assist with steady gait.    Plan of care discussed, all questions answered.    Educated on use of call light and importance of calling before getting out of bed. Pt verbalizes understanding.    Call light and belongings within reach, treaded slipper socks on, bed in lowest locked position.  All needs met at this time.

## 2024-04-02 NOTE — DISCHARGE INSTRUCTIONS
Discharge instructions:    DIET: Upon discharge from the hospital you may resume your normal diet. Depending on how you are feeling and whether you have nausea or not, you may wish to stay with a bland diet for the first few days. However, you can advance this as quickly as you feel ready.    ACTIVITIES: After discharge from the hospital, you may resume full routine activities. However, there should be no heavy lifting (greater than 15 pounds) and no strenuous activities for two weeks. Otherwise, routine activities of daily living are acceptable.    DRIVING: You may drive whenever you are off pain medications and are able to perform the activities needed to drive, i.e. turning, bending, twisting, etc.    BATHING: You may shower the day after surgery, but do not let the jets hit directly on the incisions. Let the soapy water drip down over the incisions. Do not submerge in a bath for at least a week. Your skin has glue on the incisions that will act as a scab and slowly come off after a week or two.    BOWEL FUNCTION: Much more common than loose stools, is constipation. The combination of pain medication and decreased activity level can cause constipation in otherwise normal patients. If you feel this is occurring, take a laxative (Milk of Magnesia, Ex-Lax, Senokot, etc.) until the problem has resolved.    PAIN MEDICATION: You will be given a prescription for pain medication at discharge. Please take these as directed. It is important to remember not to take medications on an empty stomach as this may cause nausea. Also, be aware narcotic pain medications cause constipation. Please take over the counter stool softeners while taking narcotic pain medications.    CALL IF YOU HAVE: (1) Fevers to more than 1010 F, (2) Unusual chest or leg pain, (3) Drainage or fluid from incision that may be foul smelling, increased tenderness or soreness at the wound or the wound edges are no longer together, redness or swelling at the  incision site. Please do not hesitate to call with any other questions.     APPOINTMENT: Contact our office at [phone] for a follow-up appointment in 1 to 2 weeks following your procedure.    If you have any additional questions, please do not hesitate to call the office.    Office address:  [address] [place], [place] [address]e [facility], [address] [place] [address]    Kei Taylor MD  Thoracic & General Surgeon  [facility]  [phone]

## 2024-04-03 ENCOUNTER — PHARMACY VISIT (OUTPATIENT)
Dept: PHARMACY | Facility: MEDICAL CENTER | Age: 49
End: 2024-04-03
Payer: COMMERCIAL

## 2024-04-03 VITALS
WEIGHT: 234.13 LBS | BODY MASS INDEX: 37.63 KG/M2 | TEMPERATURE: 97.6 F | RESPIRATION RATE: 16 BRPM | HEART RATE: 95 BPM | OXYGEN SATURATION: 92 % | HEIGHT: 66 IN | DIASTOLIC BLOOD PRESSURE: 78 MMHG | SYSTOLIC BLOOD PRESSURE: 119 MMHG

## 2024-04-03 PROCEDURE — 700111 HCHG RX REV CODE 636 W/ 250 OVERRIDE (IP): Performed by: STUDENT IN AN ORGANIZED HEALTH CARE EDUCATION/TRAINING PROGRAM

## 2024-04-03 PROCEDURE — 700102 HCHG RX REV CODE 250 W/ 637 OVERRIDE(OP): Performed by: STUDENT IN AN ORGANIZED HEALTH CARE EDUCATION/TRAINING PROGRAM

## 2024-04-03 PROCEDURE — A9270 NON-COVERED ITEM OR SERVICE: HCPCS | Performed by: STUDENT IN AN ORGANIZED HEALTH CARE EDUCATION/TRAINING PROGRAM

## 2024-04-03 RX ADMIN — LIOTHYRONINE SODIUM 5 MCG: 5 TABLET ORAL at 04:16

## 2024-04-03 RX ADMIN — KETOROLAC TROMETHAMINE 15 MG: 15 INJECTION, SOLUTION INTRAMUSCULAR; INTRAVENOUS at 04:15

## 2024-04-03 RX ADMIN — LEVOTHYROXINE SODIUM 175 MCG: 0.17 TABLET ORAL at 04:16

## 2024-04-03 RX ADMIN — BUPROPION HYDROCHLORIDE 300 MG: 150 TABLET, EXTENDED RELEASE ORAL at 04:16

## 2024-04-03 RX ADMIN — OMEPRAZOLE 20 MG: 20 CAPSULE, DELAYED RELEASE ORAL at 04:16

## 2024-04-03 RX ADMIN — ENOXAPARIN SODIUM 40 MG: 100 INJECTION SUBCUTANEOUS at 04:15

## 2024-04-03 ASSESSMENT — PAIN DESCRIPTION - PAIN TYPE: TYPE: ACUTE PAIN

## 2024-04-03 NOTE — PROGRESS NOTES
Received report from previous shift RN  Assessment complete.  A&O x 4. Patient calls appropriately.  Patient ambulates without assist. Patient has 3/10 pain. Pain managed with prescribed medications.  Denies N&V. Tolerating diet.  Skin per flowsheets.  + void, + flatus, - BM.  Patient denies SOB.  SCD's refused.  Patient pleasant and cooperative with plan of care.  Review plan with of care with patient. Call light and personal belongings with in reach. Hourly rounding in place. All needs met at this time.

## 2024-04-03 NOTE — CARE PLAN
Problem: Knowledge Deficit - Standard  Goal: Patient and family/care givers will demonstrate understanding of plan of care, disease process/condition, diagnostic tests and medications  Description: Target End Date:  1-3 days or as soon as patient condition allows    Document in Patient Education    1.  Patient and family/caregiver oriented to unit, equipment, visitation policy and means for communicating concern  2.  Complete/review Learning Assessment  3.  Assess knowledge level of disease process/condition, treatment plan, diagnostic tests and medications  4.  Explain disease process/condition, treatment plan, diagnostic tests and medications  4/2/2024 2338 by Rhett Orellana R.N.  Outcome: Progressing  4/2/2024 2338 by Rhett Orellana R.N.  Reactivated   The patient is Stable - Low risk of patient condition declining or worsening    Shift Goals  Clinical Goals: wean O2, walking O2 in Am, rest  Patient Goals: go home    Progress made toward(s) clinical / shift goals:  walking o2 assessment pending, pt requested to do walking 02 in Am, plan of care discussed with the pt, instructions on use of call bell provided, call bell within reach, pt verbalized understanding.

## 2024-04-04 ENCOUNTER — TELEPHONE (OUTPATIENT)
Dept: HEMATOLOGY ONCOLOGY | Facility: MEDICAL CENTER | Age: 49
End: 2024-04-04
Payer: COMMERCIAL

## 2024-04-04 DIAGNOSIS — C7A.090 MALIGNANT CARCINOID TUMOR OF LUNG (HCC): ICD-10-CM

## 2024-04-05 NOTE — DISCHARGE SUMMARY
Discharge Summary    CHIEF COMPLAINT ON ADMISSION  No chief complaint on file.      Reason for Admission  Solitary pulmonary nodule     Admission Date  4/1/2024    CODE STATUS  Prior    HPI & HOSPITAL COURSE  This is a 49 y.o. female here with a lung nodule.  She underwent a thoracoscopic wedge resection for mucinous lymphadenectomy.  Tolerated the procedure well without complication.  The bladder after surgery her chest tube was removed.  Her follow-up chest x-ray showed no pneumothorax.  She did have some desaturations where she would drop below 88% if she was off supplemental oxygen. She elected to stay an extra night to make sure her pain was controlled and her oxygenation was okay.  She was discharged home on postoperative day 2 in good condition with good saturations off oxygen.  No notes on file    Therefore, she is discharged in good and stable condition to home with close outpatient follow-up.    The patient met 2-midnight criteria for an inpatient stay at the time of discharge.    Discharge Date  4/3/2024    FOLLOW UP ITEMS POST DISCHARGE  Chest x-ray    DISCHARGE DIAGNOSES  Principal Problem:    Solitary lung nodule (POA: Yes)  Resolved Problems:    * No resolved hospital problems. *      FOLLOW UP  No future appointments.  Kei Taylor M.D.  [address] [place] [address] [address]  [facility] [id number] [phone]    Follow up in 1 week(s)      CARLINE ChisholmRAnthonyNAnthony  [address] [facility] [address]  [facility] [id number] [phone]            MEDICATIONS ON DISCHARGE     Medication List        START taking these medications        Instructions   oxyCODONE-acetaminophen 5-325 MG Tabs  Commonly known as: Percocet   Take 1 Tablet by mouth every four hours as needed for Moderate Pain for up to 5 days.  Dose: 1 Tablet            CONTINUE taking these medications        Instructions   acetaminophen 500 MG Tabs  Commonly known as: Tylenol   Take 1,000 mg by mouth 3 times a day as needed for Moderate Pain.  Dose: 1,000  mg     buPROPion 300 MG XL tablet  Commonly known as: Wellbutrin XL   Take 1 Tablet by mouth every morning.  Dose: 300 mg     cetirizine 10 MG Tabs  Commonly known as: ZyrTEC   Take 10 mg by mouth every morning.  Dose: 10 mg     dicyclomine 10 MG Caps  Commonly known as: Bentyl   Take 10 mg by mouth every 6 hours as needed (abddomen cramping).  Dose: 10 mg     fluticasone 50 MCG/ACT nasal spray  Commonly known as: Flonase   Administer 1 Spray into affected nostril(S) 1 time a day as needed (allergies).  Dose: 1 Spray     ibuprofen 200 MG Tabs  Commonly known as: Motrin   Take 400 mg by mouth every 8 hours as needed for Mild Pain.  Dose: 400 mg     levothyroxine 175 MCG Tabs  Commonly known as: Synthroid   Take 175 mcg by mouth see administration instructions. Hold on Sunday, take all other days  Dose: 175 mcg     liothyronine 5 MCG Tabs  Commonly known as: Cytomel   Take 5 mcg by mouth every morning.  Dose: 5 mcg     pantoprazole 40 MG Tbec  Commonly known as: Protonix   Take 1 Tablet by mouth every day.  Dose: 40 mg     traZODone 50 MG Tabs  Commonly known as: Desyrel   Take 1 Tablet by mouth at bedtime as needed for Sleep.  Dose: 50 mg              Allergies  Allergies   Allergen Reactions    Latex Rash     rash    Morphine Hives and Itching     Torso and arms       DIET  No orders of the defined types were placed in this encounter.      ACTIVITY  As tolerated.  Weight bearing as tolerated    CONSULTATIONS  none    PROCEDURES  Thoracoscopic wedge resection and mediastinal lymph node dissection    LABORATORY  Lab Results   Component Value Date    SODIUM 134 (L) 04/02/2024    POTASSIUM 4.4 04/02/2024    CHLORIDE 100 04/02/2024    CO2 22 04/02/2024    GLUCOSE 111 (H) 04/02/2024    BUN 12 04/02/2024    CREATININE 0.61 04/02/2024    GLOMRATE 83 01/03/2022        Lab Results   Component Value Date    WBC 15.6 (H) 04/02/2024    HEMOGLOBIN 14.0 04/02/2024    HEMATOCRIT 42.1 04/02/2024    PLATELETCT 273 04/02/2024         Total time of the discharge process exceeds 30 minutes.

## 2024-04-05 NOTE — TELEPHONE ENCOUNTER
Telephone Call    Patient originally seen back on 1/18/2024 after referral from PCP for lung nodule.  Please see initial consultation and follow-up visits for full complete history.    However after an unsuccessful attempt for lung biopsy I did discuss with thoracic surgeon and recommended for thoracic surgery evaluation.  Ultimately she was recommended to undergo wedge resection of the lung nodule with Dr. Taylor.    Surgery on 4/1/2024 was completed of the right lower lung wedge resection.  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  Ki-67 was less than 1% and margins were free of tumor.  1 lymph node was tested and found to be negative.  Patient reached out via [employer] and I did speak to her on the phone today to discuss the results.  I was able to discuss the typical carcinoid diagnosis and recommendations for future.    Patient did confirm that she has a follow-up visit with Dr. Taylor next week.  Overall she stated that she has been doing well since discharge from the hospital.    Based on the diagnosis of typical carcinoid, I did recommend that patient establish care with our neuroendocrine specialist medical oncologist, Dr. Lora who would like to see patient approximately 3 months after surgery.  I did discuss this recommendation in detail with the patient today and she did verbalize understanding's and agreement with the plan.  Therefore I will go ahead and place referral.  I have discussed this with Dr. Taylor as well.      FINAL DIAGNOSIS:     A. 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          Ki67 <1%.         Margins free of tumor.   B. Station 4 - Right:          Soft tissue only.          No lymph node identified.   C. Station 2 - Right:          One lymph node negative for malignancy (0/1).       1. Malignant carcinoid tumor of lung (HCC)  Referral to Hematology Oncology

## 2024-04-08 ENCOUNTER — HOSPITAL ENCOUNTER (INPATIENT)
Facility: MEDICAL CENTER | Age: 49
LOS: 2 days | DRG: 948 | End: 2024-04-10
Attending: STUDENT IN AN ORGANIZED HEALTH CARE EDUCATION/TRAINING PROGRAM | Admitting: HOSPITALIST
Payer: COMMERCIAL

## 2024-04-08 ENCOUNTER — APPOINTMENT (OUTPATIENT)
Dept: RADIOLOGY | Facility: MEDICAL CENTER | Age: 49
DRG: 948 | End: 2024-04-08
Attending: STUDENT IN AN ORGANIZED HEALTH CARE EDUCATION/TRAINING PROGRAM
Payer: COMMERCIAL

## 2024-04-08 DIAGNOSIS — R65.10 SIRS (SYSTEMIC INFLAMMATORY RESPONSE SYNDROME) (HCC): ICD-10-CM

## 2024-04-08 DIAGNOSIS — D72.829 LEUKOCYTOSIS, UNSPECIFIED TYPE: ICD-10-CM

## 2024-04-08 DIAGNOSIS — J98.11 ATELECTASIS: ICD-10-CM

## 2024-04-08 DIAGNOSIS — G89.18 POSTOPERATIVE PAIN: ICD-10-CM

## 2024-04-08 DIAGNOSIS — Z98.890 STATUS POST LUNG SURGERY: ICD-10-CM

## 2024-04-08 LAB
ALBUMIN SERPL BCP-MCNC: 4.5 G/DL (ref 3.2–4.9)
ALBUMIN/GLOB SERPL: 1.2 G/DL
ALP SERPL-CCNC: 79 U/L (ref 30–99)
ALT SERPL-CCNC: 29 U/L (ref 2–50)
ANION GAP SERPL CALC-SCNC: 13 MMOL/L (ref 7–16)
AST SERPL-CCNC: 24 U/L (ref 12–45)
BASOPHILS # BLD AUTO: 0.5 % (ref 0–1.8)
BASOPHILS # BLD: 0.07 K/UL (ref 0–0.12)
BILIRUB SERPL-MCNC: 0.3 MG/DL (ref 0.1–1.5)
BUN SERPL-MCNC: 11 MG/DL (ref 8–22)
CALCIUM ALBUM COR SERPL-MCNC: 9.3 MG/DL (ref 8.5–10.5)
CALCIUM SERPL-MCNC: 9.7 MG/DL (ref 8.4–10.2)
CHLORIDE SERPL-SCNC: 104 MMOL/L (ref 96–112)
CO2 SERPL-SCNC: 21 MMOL/L (ref 20–33)
CREAT SERPL-MCNC: 0.67 MG/DL (ref 0.5–1.4)
EOSINOPHIL # BLD AUTO: 0.43 K/UL (ref 0–0.51)
EOSINOPHIL NFR BLD: 3.3 % (ref 0–6.9)
ERYTHROCYTE [DISTWIDTH] IN BLOOD BY AUTOMATED COUNT: 40.2 FL (ref 35.9–50)
GFR SERPLBLD CREATININE-BSD FMLA CKD-EPI: 107 ML/MIN/1.73 M 2
GLOBULIN SER CALC-MCNC: 3.8 G/DL (ref 1.9–3.5)
GLUCOSE SERPL-MCNC: 100 MG/DL (ref 65–99)
HCT VFR BLD AUTO: 46.8 % (ref 37–47)
HGB BLD-MCNC: 15.7 G/DL (ref 12–16)
IMM GRANULOCYTES # BLD AUTO: 0.14 K/UL (ref 0–0.11)
IMM GRANULOCYTES NFR BLD AUTO: 1.1 % (ref 0–0.9)
LACTATE SERPL-SCNC: 0.7 MMOL/L (ref 0.5–2)
LYMPHOCYTES # BLD AUTO: 2.74 K/UL (ref 1–4.8)
LYMPHOCYTES NFR BLD: 21.1 % (ref 22–41)
MCH RBC QN AUTO: 28.9 PG (ref 27–33)
MCHC RBC AUTO-ENTMCNC: 33.5 G/DL (ref 32.2–35.5)
MCV RBC AUTO: 86 FL (ref 81.4–97.8)
MONOCYTES # BLD AUTO: 0.93 K/UL (ref 0–0.85)
MONOCYTES NFR BLD AUTO: 7.2 % (ref 0–13.4)
NEUTROPHILS # BLD AUTO: 8.67 K/UL (ref 1.82–7.42)
NEUTROPHILS NFR BLD: 66.8 % (ref 44–72)
NRBC # BLD AUTO: 0 K/UL
NRBC BLD-RTO: 0 /100 WBC (ref 0–0.2)
PLATELET # BLD AUTO: 402 K/UL (ref 164–446)
PMV BLD AUTO: 8.6 FL (ref 9–12.9)
POTASSIUM SERPL-SCNC: 4.3 MMOL/L (ref 3.6–5.5)
PROT SERPL-MCNC: 8.3 G/DL (ref 6–8.2)
RBC # BLD AUTO: 5.44 M/UL (ref 4.2–5.4)
SODIUM SERPL-SCNC: 138 MMOL/L (ref 135–145)
WBC # BLD AUTO: 13 K/UL (ref 4.8–10.8)

## 2024-04-08 PROCEDURE — 770006 HCHG ROOM/CARE - MED/SURG/GYN SEMI*

## 2024-04-08 PROCEDURE — 700105 HCHG RX REV CODE 258: Performed by: STUDENT IN AN ORGANIZED HEALTH CARE EDUCATION/TRAINING PROGRAM

## 2024-04-08 PROCEDURE — 36415 COLL VENOUS BLD VENIPUNCTURE: CPT

## 2024-04-08 PROCEDURE — 700111 HCHG RX REV CODE 636 W/ 250 OVERRIDE (IP): Mod: JZ | Performed by: STUDENT IN AN ORGANIZED HEALTH CARE EDUCATION/TRAINING PROGRAM

## 2024-04-08 PROCEDURE — 99285 EMERGENCY DEPT VISIT HI MDM: CPT

## 2024-04-08 PROCEDURE — 83605 ASSAY OF LACTIC ACID: CPT

## 2024-04-08 PROCEDURE — 87040 BLOOD CULTURE FOR BACTERIA: CPT

## 2024-04-08 PROCEDURE — 700117 HCHG RX CONTRAST REV CODE 255: Performed by: STUDENT IN AN ORGANIZED HEALTH CARE EDUCATION/TRAINING PROGRAM

## 2024-04-08 PROCEDURE — 85610 PROTHROMBIN TIME: CPT

## 2024-04-08 PROCEDURE — 99223 1ST HOSP IP/OBS HIGH 75: CPT | Performed by: HOSPITALIST

## 2024-04-08 PROCEDURE — 96365 THER/PROPH/DIAG IV INF INIT: CPT

## 2024-04-08 PROCEDURE — 96375 TX/PRO/DX INJ NEW DRUG ADDON: CPT

## 2024-04-08 PROCEDURE — 80053 COMPREHEN METABOLIC PANEL: CPT

## 2024-04-08 PROCEDURE — 71260 CT THORAX DX C+: CPT

## 2024-04-08 PROCEDURE — 85025 COMPLETE CBC W/AUTO DIFF WBC: CPT

## 2024-04-08 RX ORDER — LIOTHYRONINE SODIUM 5 UG/1
5 TABLET ORAL EVERY MORNING
Status: DISCONTINUED | OUTPATIENT
Start: 2024-04-09 | End: 2024-04-10 | Stop reason: HOSPADM

## 2024-04-08 RX ORDER — PROMETHAZINE HYDROCHLORIDE 25 MG/1
12.5-25 TABLET ORAL EVERY 4 HOURS PRN
Status: DISCONTINUED | OUTPATIENT
Start: 2024-04-08 | End: 2024-04-10 | Stop reason: HOSPADM

## 2024-04-08 RX ORDER — AMOXICILLIN 250 MG
2 CAPSULE ORAL 2 TIMES DAILY
Status: DISCONTINUED | OUTPATIENT
Start: 2024-04-08 | End: 2024-04-10 | Stop reason: HOSPADM

## 2024-04-08 RX ORDER — HYDROMORPHONE HYDROCHLORIDE 1 MG/ML
0.5 INJECTION, SOLUTION INTRAMUSCULAR; INTRAVENOUS; SUBCUTANEOUS
Status: DISCONTINUED | OUTPATIENT
Start: 2024-04-08 | End: 2024-04-09

## 2024-04-08 RX ORDER — PROMETHAZINE HYDROCHLORIDE 25 MG/1
12.5-25 SUPPOSITORY RECTAL EVERY 4 HOURS PRN
Status: DISCONTINUED | OUTPATIENT
Start: 2024-04-08 | End: 2024-04-10 | Stop reason: HOSPADM

## 2024-04-08 RX ORDER — ONDANSETRON 4 MG/1
4 TABLET, ORALLY DISINTEGRATING ORAL EVERY 4 HOURS PRN
Status: DISCONTINUED | OUTPATIENT
Start: 2024-04-08 | End: 2024-04-10 | Stop reason: HOSPADM

## 2024-04-08 RX ORDER — FLUTICASONE PROPIONATE 50 MCG
1 SPRAY, SUSPENSION (ML) NASAL
Status: DISCONTINUED | OUTPATIENT
Start: 2024-04-08 | End: 2024-04-09

## 2024-04-08 RX ORDER — SODIUM CHLORIDE, SODIUM LACTATE, POTASSIUM CHLORIDE, CALCIUM CHLORIDE 600; 310; 30; 20 MG/100ML; MG/100ML; MG/100ML; MG/100ML
INJECTION, SOLUTION INTRAVENOUS ONCE
Status: COMPLETED | OUTPATIENT
Start: 2024-04-08 | End: 2024-04-09

## 2024-04-08 RX ORDER — SODIUM CHLORIDE 9 MG/ML
INJECTION, SOLUTION INTRAVENOUS ONCE
Status: COMPLETED | OUTPATIENT
Start: 2024-04-08 | End: 2024-04-08

## 2024-04-08 RX ORDER — HYDROMORPHONE HYDROCHLORIDE 1 MG/ML
0.5 INJECTION, SOLUTION INTRAMUSCULAR; INTRAVENOUS; SUBCUTANEOUS ONCE
Status: COMPLETED | OUTPATIENT
Start: 2024-04-08 | End: 2024-04-08

## 2024-04-08 RX ORDER — OXYCODONE HYDROCHLORIDE AND ACETAMINOPHEN 5; 325 MG/1; MG/1
1 TABLET ORAL EVERY 4 HOURS PRN
Status: DISCONTINUED | OUTPATIENT
Start: 2024-04-08 | End: 2024-04-09

## 2024-04-08 RX ORDER — BUPROPION HYDROCHLORIDE 150 MG/1
300 TABLET, EXTENDED RELEASE ORAL EVERY MORNING
Status: DISCONTINUED | OUTPATIENT
Start: 2024-04-09 | End: 2024-04-10 | Stop reason: HOSPADM

## 2024-04-08 RX ORDER — OMEPRAZOLE 20 MG/1
20 CAPSULE, DELAYED RELEASE ORAL DAILY
Status: DISCONTINUED | OUTPATIENT
Start: 2024-04-09 | End: 2024-04-10 | Stop reason: HOSPADM

## 2024-04-08 RX ORDER — SODIUM CHLORIDE, SODIUM LACTATE, POTASSIUM CHLORIDE, AND CALCIUM CHLORIDE .6; .31; .03; .02 G/100ML; G/100ML; G/100ML; G/100ML
500 INJECTION, SOLUTION INTRAVENOUS
Status: DISCONTINUED | OUTPATIENT
Start: 2024-04-08 | End: 2024-04-10 | Stop reason: HOSPADM

## 2024-04-08 RX ORDER — ACETAMINOPHEN 325 MG/1
650 TABLET ORAL EVERY 6 HOURS PRN
Status: DISCONTINUED | OUTPATIENT
Start: 2024-04-08 | End: 2024-04-10 | Stop reason: HOSPADM

## 2024-04-08 RX ORDER — POLYETHYLENE GLYCOL 3350 17 G/17G
1 POWDER, FOR SOLUTION ORAL
Status: DISCONTINUED | OUTPATIENT
Start: 2024-04-08 | End: 2024-04-10 | Stop reason: HOSPADM

## 2024-04-08 RX ORDER — TRAZODONE HYDROCHLORIDE 50 MG/1
50 TABLET ORAL NIGHTLY PRN
Status: DISCONTINUED | OUTPATIENT
Start: 2024-04-08 | End: 2024-04-10 | Stop reason: HOSPADM

## 2024-04-08 RX ORDER — ONDANSETRON 2 MG/ML
4 INJECTION INTRAMUSCULAR; INTRAVENOUS EVERY 4 HOURS PRN
Status: DISCONTINUED | OUTPATIENT
Start: 2024-04-08 | End: 2024-04-10 | Stop reason: HOSPADM

## 2024-04-08 RX ORDER — PROCHLORPERAZINE EDISYLATE 5 MG/ML
5-10 INJECTION INTRAMUSCULAR; INTRAVENOUS EVERY 4 HOURS PRN
Status: DISCONTINUED | OUTPATIENT
Start: 2024-04-08 | End: 2024-04-10 | Stop reason: HOSPADM

## 2024-04-08 RX ORDER — LEVOTHYROXINE SODIUM 175 UG/1
175 TABLET ORAL SEE ADMIN INSTRUCTIONS
Status: DISCONTINUED | OUTPATIENT
Start: 2024-04-08 | End: 2024-04-09

## 2024-04-08 RX ADMIN — SODIUM CHLORIDE, POTASSIUM CHLORIDE, SODIUM LACTATE AND CALCIUM CHLORIDE 1000 ML: 600; 310; 30; 20 INJECTION, SOLUTION INTRAVENOUS at 23:26

## 2024-04-08 RX ADMIN — HYDROMORPHONE HYDROCHLORIDE 0.5 MG: 1 INJECTION, SOLUTION INTRAMUSCULAR; INTRAVENOUS; SUBCUTANEOUS at 21:37

## 2024-04-08 RX ADMIN — AMPICILLIN AND SULBACTAM 3 G: 1; 2 INJECTION, POWDER, FOR SOLUTION INTRAMUSCULAR; INTRAVENOUS at 23:32

## 2024-04-08 RX ADMIN — SODIUM CHLORIDE 1000 ML: 9 INJECTION, SOLUTION INTRAVENOUS at 21:36

## 2024-04-08 RX ADMIN — IOHEXOL 75 ML: 350 INJECTION, SOLUTION INTRAVENOUS at 22:33

## 2024-04-08 ASSESSMENT — ENCOUNTER SYMPTOMS
SORE THROAT: 0
BLURRED VISION: 0
MYALGIAS: 0
NAUSEA: 0
WEAKNESS: 0
COUGH: 0
DEPRESSION: 0
HEADACHES: 0
FEVER: 0
NECK PAIN: 0
DIZZINESS: 0
BRUISES/BLEEDS EASILY: 0
INSOMNIA: 0
VOMITING: 0
DOUBLE VISION: 0
PALPITATIONS: 0

## 2024-04-08 ASSESSMENT — FIBROSIS 4 INDEX: FIB4 SCORE: 0.52

## 2024-04-09 ENCOUNTER — APPOINTMENT (OUTPATIENT)
Dept: RADIOLOGY | Facility: MEDICAL CENTER | Age: 49
DRG: 948 | End: 2024-04-09
Attending: INTERNAL MEDICINE
Payer: COMMERCIAL

## 2024-04-09 PROBLEM — D18.03 LIVER HEMANGIOMA: Status: ACTIVE | Noted: 2024-04-09

## 2024-04-09 PROBLEM — R51.9 HEADACHE: Status: ACTIVE | Noted: 2024-04-09

## 2024-04-09 PROBLEM — G89.18 PAIN, ACUTE POSTOPERATIVE: Status: ACTIVE | Noted: 2024-04-09

## 2024-04-09 LAB
ALBUMIN SERPL BCP-MCNC: 3.7 G/DL (ref 3.2–4.9)
ALP SERPL-CCNC: 62 U/L (ref 30–99)
ALT SERPL-CCNC: 27 U/L (ref 2–50)
ANION GAP SERPL CALC-SCNC: 10 MMOL/L (ref 7–16)
APPEARANCE UR: CLEAR
AST SERPL-CCNC: 24 U/L (ref 12–45)
BACTERIA #/AREA URNS HPF: ABNORMAL /HPF
BILIRUB CONJ SERPL-MCNC: <0.2 MG/DL (ref 0.1–0.5)
BILIRUB INDIRECT SERPL-MCNC: NORMAL MG/DL (ref 0–1)
BILIRUB SERPL-MCNC: 0.4 MG/DL (ref 0.1–1.5)
BILIRUB UR QL STRIP.AUTO: NEGATIVE
BUN SERPL-MCNC: 9 MG/DL (ref 8–22)
CALCIUM SERPL-MCNC: 8.6 MG/DL (ref 8.4–10.2)
CHLORIDE SERPL-SCNC: 105 MMOL/L (ref 96–112)
CO2 SERPL-SCNC: 23 MMOL/L (ref 20–33)
COLOR UR: YELLOW
CREAT SERPL-MCNC: 0.63 MG/DL (ref 0.5–1.4)
EPI CELLS #/AREA URNS HPF: ABNORMAL /HPF
ERYTHROCYTE [DISTWIDTH] IN BLOOD BY AUTOMATED COUNT: 41.8 FL (ref 35.9–50)
GFR SERPLBLD CREATININE-BSD FMLA CKD-EPI: 109 ML/MIN/1.73 M 2
GLUCOSE SERPL-MCNC: 113 MG/DL (ref 65–99)
GLUCOSE UR STRIP.AUTO-MCNC: NEGATIVE MG/DL
HCT VFR BLD AUTO: 39.3 % (ref 37–47)
HGB BLD-MCNC: 13 G/DL (ref 12–16)
INR PPP: 0.94 (ref 0.87–1.13)
KETONES UR STRIP.AUTO-MCNC: 40 MG/DL
LEUKOCYTE ESTERASE UR QL STRIP.AUTO: NEGATIVE
LIPASE SERPL-CCNC: 22 U/L (ref 11–82)
MCH RBC QN AUTO: 29.1 PG (ref 27–33)
MCHC RBC AUTO-ENTMCNC: 33.1 G/DL (ref 32.2–35.5)
MCV RBC AUTO: 87.9 FL (ref 81.4–97.8)
MICRO URNS: ABNORMAL
NITRITE UR QL STRIP.AUTO: NEGATIVE
PH UR STRIP.AUTO: 6 [PH] (ref 5–8)
PLATELET # BLD AUTO: 286 K/UL (ref 164–446)
PMV BLD AUTO: 8.5 FL (ref 9–12.9)
POTASSIUM SERPL-SCNC: 3.9 MMOL/L (ref 3.6–5.5)
PROCALCITONIN SERPL-MCNC: 0.06 NG/ML
PROT SERPL-MCNC: 6.4 G/DL (ref 6–8.2)
PROT UR QL STRIP: NEGATIVE MG/DL
PROTHROMBIN TIME: 12.9 SEC (ref 12–14.6)
RBC # BLD AUTO: 4.47 M/UL (ref 4.2–5.4)
RBC # URNS HPF: ABNORMAL /HPF
RBC UR QL AUTO: ABNORMAL
SODIUM SERPL-SCNC: 138 MMOL/L (ref 135–145)
SP GR UR STRIP.AUTO: >=1.03
WBC # BLD AUTO: 10.7 K/UL (ref 4.8–10.8)
WBC #/AREA URNS HPF: ABNORMAL /HPF

## 2024-04-09 PROCEDURE — 700111 HCHG RX REV CODE 636 W/ 250 OVERRIDE (IP): Performed by: STUDENT IN AN ORGANIZED HEALTH CARE EDUCATION/TRAINING PROGRAM

## 2024-04-09 PROCEDURE — 96367 TX/PROPH/DG ADDL SEQ IV INF: CPT

## 2024-04-09 PROCEDURE — 81001 URINALYSIS AUTO W/SCOPE: CPT

## 2024-04-09 PROCEDURE — 99233 SBSQ HOSP IP/OBS HIGH 50: CPT | Performed by: INTERNAL MEDICINE

## 2024-04-09 PROCEDURE — 700102 HCHG RX REV CODE 250 W/ 637 OVERRIDE(OP): Performed by: HOSPITALIST

## 2024-04-09 PROCEDURE — 74018 RADEX ABDOMEN 1 VIEW: CPT

## 2024-04-09 PROCEDURE — 85027 COMPLETE CBC AUTOMATED: CPT

## 2024-04-09 PROCEDURE — A9270 NON-COVERED ITEM OR SERVICE: HCPCS | Performed by: HOSPITALIST

## 2024-04-09 PROCEDURE — 83690 ASSAY OF LIPASE: CPT

## 2024-04-09 PROCEDURE — 96375 TX/PRO/DX INJ NEW DRUG ADDON: CPT

## 2024-04-09 PROCEDURE — 96376 TX/PRO/DX INJ SAME DRUG ADON: CPT

## 2024-04-09 PROCEDURE — 700102 HCHG RX REV CODE 250 W/ 637 OVERRIDE(OP): Performed by: INTERNAL MEDICINE

## 2024-04-09 PROCEDURE — 36415 COLL VENOUS BLD VENIPUNCTURE: CPT

## 2024-04-09 PROCEDURE — 94760 N-INVAS EAR/PLS OXIMETRY 1: CPT

## 2024-04-09 PROCEDURE — 700111 HCHG RX REV CODE 636 W/ 250 OVERRIDE (IP): Performed by: HOSPITALIST

## 2024-04-09 PROCEDURE — 84145 PROCALCITONIN (PCT): CPT

## 2024-04-09 PROCEDURE — 700105 HCHG RX REV CODE 258: Performed by: HOSPITALIST

## 2024-04-09 PROCEDURE — 80076 HEPATIC FUNCTION PANEL: CPT

## 2024-04-09 PROCEDURE — 770006 HCHG ROOM/CARE - MED/SURG/GYN SEMI*

## 2024-04-09 PROCEDURE — 80048 BASIC METABOLIC PNL TOTAL CA: CPT

## 2024-04-09 PROCEDURE — 700105 HCHG RX REV CODE 258: Performed by: STUDENT IN AN ORGANIZED HEALTH CARE EDUCATION/TRAINING PROGRAM

## 2024-04-09 PROCEDURE — A9270 NON-COVERED ITEM OR SERVICE: HCPCS | Performed by: INTERNAL MEDICINE

## 2024-04-09 RX ORDER — LEVOTHYROXINE SODIUM 175 UG/1
175 TABLET ORAL
Status: DISCONTINUED | OUTPATIENT
Start: 2024-04-09 | End: 2024-04-10 | Stop reason: HOSPADM

## 2024-04-09 RX ORDER — BISACODYL 10 MG
10 SUPPOSITORY, RECTAL RECTAL DAILY
Status: DISCONTINUED | OUTPATIENT
Start: 2024-04-09 | End: 2024-04-10 | Stop reason: HOSPADM

## 2024-04-09 RX ORDER — KETOROLAC TROMETHAMINE 15 MG/ML
15 INJECTION, SOLUTION INTRAMUSCULAR; INTRAVENOUS EVERY 6 HOURS PRN
Status: DISCONTINUED | OUTPATIENT
Start: 2024-04-09 | End: 2024-04-10 | Stop reason: HOSPADM

## 2024-04-09 RX ORDER — HYDROMORPHONE HYDROCHLORIDE 1 MG/ML
1 INJECTION, SOLUTION INTRAMUSCULAR; INTRAVENOUS; SUBCUTANEOUS
Status: DISCONTINUED | OUTPATIENT
Start: 2024-04-09 | End: 2024-04-09

## 2024-04-09 RX ORDER — HYDROMORPHONE HYDROCHLORIDE 1 MG/ML
1 INJECTION, SOLUTION INTRAMUSCULAR; INTRAVENOUS; SUBCUTANEOUS
Status: DISCONTINUED | OUTPATIENT
Start: 2024-04-09 | End: 2024-04-10 | Stop reason: HOSPADM

## 2024-04-09 RX ORDER — KETOROLAC TROMETHAMINE 15 MG/ML
15 INJECTION, SOLUTION INTRAMUSCULAR; INTRAVENOUS EVERY 6 HOURS PRN
Status: DISCONTINUED | OUTPATIENT
Start: 2024-04-09 | End: 2024-04-09

## 2024-04-09 RX ORDER — SUMATRIPTAN 25 MG/1
25 TABLET, FILM COATED ORAL
Status: DISCONTINUED | OUTPATIENT
Start: 2024-04-09 | End: 2024-04-10 | Stop reason: HOSPADM

## 2024-04-09 RX ADMIN — HYDROMORPHONE HYDROCHLORIDE 1 MG: 1 INJECTION, SOLUTION INTRAMUSCULAR; INTRAVENOUS; SUBCUTANEOUS at 20:47

## 2024-04-09 RX ADMIN — AMPICILLIN AND SULBACTAM 3 G: 1; 2 INJECTION, POWDER, FOR SOLUTION INTRAMUSCULAR; INTRAVENOUS at 23:23

## 2024-04-09 RX ADMIN — LEVOTHYROXINE SODIUM 175 MCG: 0.17 TABLET ORAL at 06:54

## 2024-04-09 RX ADMIN — ACETAMINOPHEN 650 MG: 325 TABLET ORAL at 20:47

## 2024-04-09 RX ADMIN — BUPROPION HYDROCHLORIDE 300 MG: 150 TABLET, FILM COATED, EXTENDED RELEASE ORAL at 06:00

## 2024-04-09 RX ADMIN — HYDROMORPHONE HYDROCHLORIDE 1 MG: 1 INJECTION, SOLUTION INTRAMUSCULAR; INTRAVENOUS; SUBCUTANEOUS at 09:18

## 2024-04-09 RX ADMIN — AMPICILLIN AND SULBACTAM 3 G: 1; 2 INJECTION, POWDER, FOR SOLUTION INTRAMUSCULAR; INTRAVENOUS at 11:29

## 2024-04-09 RX ADMIN — OMEPRAZOLE 20 MG: 20 CAPSULE, DELAYED RELEASE ORAL at 06:00

## 2024-04-09 RX ADMIN — METHOCARBAMOL 1000 MG: 100 INJECTION, SOLUTION INTRAMUSCULAR; INTRAVENOUS at 17:30

## 2024-04-09 RX ADMIN — HYDROMORPHONE HYDROCHLORIDE 0.5 MG: 1 INJECTION, SOLUTION INTRAMUSCULAR; INTRAVENOUS; SUBCUTANEOUS at 00:41

## 2024-04-09 RX ADMIN — ONDANSETRON 4 MG: 2 INJECTION INTRAMUSCULAR; INTRAVENOUS at 13:43

## 2024-04-09 RX ADMIN — DOCUSATE SODIUM 50MG AND SENNOSIDES 8.6MG 2 TABLET: 8.6; 5 TABLET, FILM COATED ORAL at 16:56

## 2024-04-09 RX ADMIN — PROCHLORPERAZINE EDISYLATE 5 MG: 5 INJECTION INTRAMUSCULAR; INTRAVENOUS at 12:24

## 2024-04-09 RX ADMIN — AMPICILLIN AND SULBACTAM 3 G: 1; 2 INJECTION, POWDER, FOR SOLUTION INTRAMUSCULAR; INTRAVENOUS at 06:00

## 2024-04-09 RX ADMIN — BISACODYL 10 MG: 10 SUPPOSITORY RECTAL at 16:57

## 2024-04-09 RX ADMIN — METHOCARBAMOL 1000 MG: 100 INJECTION, SOLUTION INTRAMUSCULAR; INTRAVENOUS at 09:58

## 2024-04-09 RX ADMIN — ONDANSETRON 4 MG: 2 INJECTION INTRAMUSCULAR; INTRAVENOUS at 04:42

## 2024-04-09 RX ADMIN — HYDROMORPHONE HYDROCHLORIDE 1 MG: 1 INJECTION, SOLUTION INTRAMUSCULAR; INTRAVENOUS; SUBCUTANEOUS at 06:31

## 2024-04-09 RX ADMIN — AMPICILLIN AND SULBACTAM 3 G: 1; 2 INJECTION, POWDER, FOR SOLUTION INTRAMUSCULAR; INTRAVENOUS at 16:52

## 2024-04-09 RX ADMIN — LIOTHYRONINE SODIUM 5 MCG: 5 TABLET ORAL at 06:00

## 2024-04-09 RX ADMIN — HYDROMORPHONE HYDROCHLORIDE 0.5 MG: 1 INJECTION, SOLUTION INTRAMUSCULAR; INTRAVENOUS; SUBCUTANEOUS at 03:44

## 2024-04-09 RX ADMIN — ONDANSETRON 4 MG: 2 INJECTION INTRAMUSCULAR; INTRAVENOUS at 09:20

## 2024-04-09 ASSESSMENT — COGNITIVE AND FUNCTIONAL STATUS - GENERAL
SUGGESTED CMS G CODE MODIFIER MOBILITY: CH
MOBILITY SCORE: 24
DAILY ACTIVITIY SCORE: 24
SUGGESTED CMS G CODE MODIFIER DAILY ACTIVITY: CH

## 2024-04-09 ASSESSMENT — LIFESTYLE VARIABLES
DOES PATIENT WANT TO STOP DRINKING: NO
AVERAGE NUMBER OF DAYS PER WEEK YOU HAVE A DRINK CONTAINING ALCOHOL: 1
EVER FELT BAD OR GUILTY ABOUT YOUR DRINKING: NO
ALCOHOL_USE: YES
HAVE PEOPLE ANNOYED YOU BY CRITICIZING YOUR DRINKING: NO
TOTAL SCORE: 0
EVER HAD A DRINK FIRST THING IN THE MORNING TO STEADY YOUR NERVES TO GET RID OF A HANGOVER: NO
ON A TYPICAL DAY WHEN YOU DRINK ALCOHOL HOW MANY DRINKS DO YOU HAVE: 1
HOW MANY TIMES IN THE PAST YEAR HAVE YOU HAD 5 OR MORE DRINKS IN A DAY: 0
CONSUMPTION TOTAL: NEGATIVE
TOTAL SCORE: 0
HAVE YOU EVER FELT YOU SHOULD CUT DOWN ON YOUR DRINKING: NO
TOTAL SCORE: 0

## 2024-04-09 ASSESSMENT — PATIENT HEALTH QUESTIONNAIRE - PHQ9
1. LITTLE INTEREST OR PLEASURE IN DOING THINGS: NOT AT ALL
2. FEELING DOWN, DEPRESSED, IRRITABLE, OR HOPELESS: NOT AT ALL
SUM OF ALL RESPONSES TO PHQ9 QUESTIONS 1 AND 2: 0

## 2024-04-09 ASSESSMENT — ENCOUNTER SYMPTOMS
SHORTNESS OF BREATH: 1
ABDOMINAL PAIN: 1
CONSTIPATION: 1

## 2024-04-09 ASSESSMENT — PAIN DESCRIPTION - PAIN TYPE
TYPE: ACUTE PAIN
TYPE: ACUTE PAIN

## 2024-04-09 ASSESSMENT — FIBROSIS 4 INDEX: FIB4 SCORE: 0.79

## 2024-04-09 NOTE — CONSULTS
Thoracic & General Surgery Consultation      Date of Service  4/9/2024    Referring Physician  Yvon Thomas M.D.    Consulting Physician  Kei Taylor M.D.    Reason for Consultation  Right chest pain    History of Presenting Illness  Ms. Ifrah Brunson is a 49 y.o. female who presented 4/8/2024 with sudden onset right chest pain at 4 PM yesterday.  She recently underwent a right thoracoscopic wedge resection and mediastinal lymphadenectomy on 4/1/2024.  She was recovering well until this chest pain yesterday afternoon.  She was not doing any specific exercises or movements that proceeded the chest pain.  She is having some nausea.  She denies fevers, chills, shortness of breath.  She had some diarrhea on Sunday.    Review of Systems  All other systems reviewed and negative except as noted above    Past Medical History   has a past medical history of Bowel habit changes, Cancer (HCC) (2012), Hiatus hernia syndrome (2022), High cholesterol (2023), Indigestion (2022), PONV (postoperative nausea and vomiting) (2010 and 2012), Post-surgical hypothyroidism (01/01/2012), Psychiatric problem (1990), and Snoring.    She has no past medical history of Acute nasopharyngitis, Anesthesia, Anginal syndrome (HCC), Arrhythmia, Arthritis, Asthma, Blood clotting disorder (HCC), Breath shortness, Bronchitis, Carcinoma in situ of respiratory system, Cataract, Congestive heart failure (HCC), Continuous ambulatory peritoneal dialysis status (HCC), Coughing blood, Dental disorder, Diabetes (HCC), Dialysis patient (HCC), Emphysema of lung (HCC), Glaucoma, Gynecological disorder, Heart burn, Heart murmur, Heart valve disease, Hemorrhagic disorder (HCC), Hepatitis A, Hepatitis B, Hepatitis C, Hypertension, Infectious disease, Jaundice, Myocardial infarct (HCC), Pacemaker, Pain, Pneumonia, Pregnant, Renal disorder, Rheumatic fever, Seizure (HCC), Sleep apnea, Stroke (HCC), Tuberculosis, Urinary bladder disorder,  or Urinary incontinence.    Surgical History   has a past surgical history that includes cholecystectomy (01/01/2009); gyn surgery (2016); other (2012); craniotomy (Right, 05/18/2023); pr thoracoscopy,dx no bx (Right, 4/1/2024); and pr tube thoracostomy includes water seal (Right, 4/1/2024).    Family History  family history includes Arterial Aneurysm in her father; Breast Cancer in her maternal grandmother; Cancer in her maternal grandmother, maternal uncle, and paternal uncle; Colorectal Cancer in her paternal uncle; Diabetes in her father and paternal grandmother; Heart Disease in her father, maternal grandfather, and paternal grandfather; Hyperlipidemia in her father, maternal grandfather, mother, and paternal grandfather; Hypertension in her maternal grandfather and paternal grandfather; Psychiatric Illness in her father.    Social History   reports that she quit smoking about 15 years ago. Her smoking use included cigarettes. She has never used smokeless tobacco. She reports current alcohol use of about 1.2 oz of alcohol per week. She reports that she does not use drugs.    Medications  Current Facility-Administered Medications   Medication Dose Route Frequency Provider Last Rate Last Admin    HYDROmorphone (Dilaudid) injection 1 mg  1 mg Intravenous Q3HRS PRN Jenniffer Nelson M.D.   1 mg at 04/09/24 0918    levothyroxine (Synthroid) tablet 175 mcg  175 mcg Oral AM ES Yvon Thomas M.D.   175 mcg at 04/09/24 0654    methocarbamol (Robaxin) 1,000 mg in  mL IVPB  1,000 mg Intravenous Q6HRS Kei Taylor M.D.        buPROPion SR (Wellbutrin-SR) tablet 300 mg  300 mg Oral PATRICIO Nelson M.D.   300 mg at 04/09/24 0600    liothyronine (Cytomel) tablet 5 mcg  5 mcg Oral PATRICIO Nelson M.D.   5 mcg at 04/09/24 0600    oxyCODONE-acetaminophen (Percocet) 5-325 MG per tablet 1 Tablet  1 Tablet Oral Q4HRS PRN Jenniffer Nelson M.D.        omeprazole (PriLOSEC) capsule  20 mg  20 mg Oral DAILY Jenniffer Nelson M.D.   20 mg at 04/09/24 0600    traZODone (Desyrel) tablet 50 mg  50 mg Oral HS PRN Jenniffer Nelson M.D.        acetaminophen (Tylenol) tablet 650 mg  650 mg Oral Q6HRS PRN Jenniffer Nelson M.D.        senna-docusate (Pericolace Or Senokot S) 8.6-50 MG per tablet 2 Tablet  2 Tablet Oral BID Jenniffer Nelson M.D.        And    polyethylene glycol/lytes (Miralax) Packet 1 Packet  1 Packet Oral QDAY PRN Jenniffer Nelson M.D.        ondansetron (Zofran) syringe/vial injection 4 mg  4 mg Intravenous Q4HRS PRN Jenniffer Nelson M.D.   4 mg at 04/09/24 0920    ondansetron (Zofran ODT) dispertab 4 mg  4 mg Oral Q4HRS PRN Jenniffer Nelson M.D.        promethazine (Phenergan) tablet 12.5-25 mg  12.5-25 mg Oral Q4HRS PRN Jenniffer Nelson M.D.        promethazine (Phenergan) suppository 12.5-25 mg  12.5-25 mg Rectal Q4HRS PRN Jenniffer Nelson M.D.        prochlorperazine (Compazine) injection 5-10 mg  5-10 mg Intravenous Q4HRS PRN Jenniffer Nelson M.D.        LR (Bolus) infusion 500 mL  500 mL Intravenous Once PRN Jenniffer Nelson M.D.        ampicillin/sulbactam (Unasyn) 3 g in  mL IVPB  3 g Intravenous Q6HRS Jenniffer Nelson M.D. 200 mL/hr at 04/09/24 0600 3 g at 04/09/24 0600     Current Outpatient Medications   Medication Sig Dispense Refill    acetaminophen (TYLENOL) 500 MG Tab Take 1,000 mg by mouth 3 times a day as needed for Moderate Pain.      ibuprofen (MOTRIN) 200 MG Tab Take 600 mg by mouth every 8 hours as needed for Mild Pain.      buPROPion (WELLBUTRIN XL) 300 MG XL tablet Take 1 Tablet by mouth every morning. 90 Tablet 3    pantoprazole (PROTONIX) 40 MG Tablet Delayed Response Take 1 Tablet by mouth every day. 90 Tablet 3    liothyronine (CYTOMEL) 5 MCG Tab Take 5 mcg by mouth every morning.      levothyroxine (SYNTHROID) 175 MCG Tab Take 175 mcg by mouth see administration  instructions. Hold on Sunday, take all other days      cetirizine (ZYRTEC) 10 MG Tab Take 10 mg by mouth every morning.      fluticasone (FLONASE) 50 MCG/ACT nasal spray Administer 1 Spray into affected nostril(S) 1 time a day as needed (allergies).      traZODone (DESYREL) 50 MG Tab Take 1 Tablet by mouth at bedtime as needed for Sleep. 90 Tablet 3       Allergies  Allergies   Allergen Reactions    Latex Rash     rash    Morphine Hives and Itching     Torso and arms       Physical Exam  Temp:  [36.3 °C (97.4 °F)] 36.3 °C (97.4 °F)  Pulse:  [] 84  Resp:  [16-22] 16  BP: (123-137)/(70-90) 123/75  SpO2:  [90 %-95 %] 94 %    GEN: Healthy appearing, well developed, no acute distress.  PSYCH: Alert and oriented x3. Normal  memory, mood, and affect.  HEENT     -Head: Normocephalic, atraumatic     -Eyes: PERRL, No discharge or redness;     -Ears: External ears are normal.      -Nose: Normal nares.     -Throat: moist mucus membranes, good dentition    NECK: Supple, trachea midline with no masses.  CV: RRR, radial pulses 2+, brisk cap refill on hands  LUNGS: no labored breathing on room air, no wheezing; incisions healing well without erythema or drainage or other signs of infection  ABD: Soft, nontender, nondistended  SKIN: Warm, well perfused. No skin rashes or abnormal lesions.  MSK: strength 5/5 throughout. No deformities  EXT: No clubbing, cyanosis, or edema.  NEURO: No focal deficits      Fluids  Date 04/09/24 0700 - 04/10/24 0659   Shift 2147-3059 4673-5877 8699-1100 24 Hour Total   INTAKE   I.V. 1000   1000   Shift Total 1000   1000   OUTPUT   Shift Total       Weight (kg) 105.1 105.1 105.1 105.1       Laboratory  Recent Labs     04/08/24 2110 04/09/24  0318   WBC 13.0* 10.7   RBC 5.44* 4.47   HEMOGLOBIN 15.7 13.0   HEMATOCRIT 46.8 39.3   MCV 86.0 87.9   MCH 28.9 29.1   MCHC 33.5 33.1   RDW 40.2 41.8   PLATELETCT 402 286   MPV 8.6* 8.5*     Recent Labs     04/08/24  2110 04/09/24  0318   SODIUM 138 138    POTASSIUM 4.3 3.9   CHLORIDE 104 105   CO2 21 23   GLUCOSE 100* 113*   BUN 11 9   CREATININE 0.67 0.63   CALCIUM 9.7 8.6     Recent Labs     04/08/24  2110   INR 0.94                 Imaging  CT-CHEST (THORAX) WITH   Final Result      1.  Multifocal right lung atelectasis      2.  No hematoma or pneumothorax      3.  Right chest wall air consistent with recent surgery      4.  Multiple hemangioma within the liver            Fleischner Society pulmonary nodule recommendations:   Not Applicable             Assessment/Plan  This is a 49 y.o. female who presents with sudden onset right chest pain after undergoing a thoracoscopic wedge resection on 4/1/2024.  CT scan shows normal postoperative findings with minimal subcutaneous emphysema, minimal chest fluid, and a small amount of atelectasis next to the staple lines.  Incisions on her chest do not show any signs of infection.  She did have a white count with a small left shift.  This could be early pneumonia.  Will plan for a 5-day course of Augmentin.  Will add Robaxin to her pain meds and she is okay for diet.  If she can tolerate a diet and she is feeling better by this afternoon she may potentially be discharged home otherwise we will keep her for observation.      Kei Taylor MD  Thoracic & General Surgeon  [facility]  [phone]

## 2024-04-09 NOTE — ED PROVIDER NOTES
ED Provider Note    CHIEF COMPLAINT  Chief Complaint   Patient presents with    Pain     Recent lung surgery 4/1/2024  removed a tumor  today around 4 pain started having severe sharp pain to site of surgery          EXTERNAL RECORDS REVIEWED  Outpatient Notes surgery note on 4/1/2024 for right smooth lung wedge resection       HPI/ROS  LIMITATION TO HISTORY   Select: : None  OUTSIDE HISTORIAN(S):    Ifrah Brunson is a 49 y.o. female who presents with acute onset right-sided chest wall pain that started at 4 PM.  Patient also endorses shortness of breath when this started.  Patient reports that her pain is significantly worse when she coughs or moves.  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  Patient denies fever chills body aches or sweats.  Patient reports cough that has been present since the surgery.    PAST MEDICAL HISTORY   has a past medical history of Bowel habit changes, Cancer (HCC) (2012), Hiatus hernia syndrome (2022), High cholesterol (2023), Indigestion (2022), PONV (postoperative nausea and vomiting) (2010 and 2012), Post-surgical hypothyroidism (01/01/2012), Psychiatric problem (1990), and Snoring.    SURGICAL HISTORY   has a past surgical history that includes cholecystectomy (01/01/2009); gyn surgery (2016); other (2012); craniotomy (Right, 05/18/2023); thoracoscopy,dx no bx (Right, 4/1/2024); and tube thoracostomy includes water seal (Right, 4/1/2024).    FAMILY HISTORY  Family History   Problem Relation Age of Onset    Hyperlipidemia Mother     Heart Disease Father     Hyperlipidemia Father     Psychiatric Illness Father         depression    Diabetes Father         Type 2    Arterial Aneurysm Father         Thoracic and abdominal aortic    Cancer Maternal Uncle         Adrenal Cancer    Cancer Paternal Uncle         Colon cancer    Colorectal Cancer Paternal Uncle     Cancer Maternal Grandmother         Breast Cancer/Lung  "Cancer    Breast Cancer Maternal Grandmother     Heart Disease Maternal Grandfather     Hypertension Maternal Grandfather     Hyperlipidemia Maternal Grandfather     Diabetes Paternal Grandmother         Type 2    Heart Disease Paternal Grandfather     Hypertension Paternal Grandfather     Hyperlipidemia Paternal Grandfather     Stroke Neg Hx        SOCIAL HISTORY  Social History     Tobacco Use    Smoking status: Former     Current packs/day: 0.00     Types: Cigarettes     Quit date: 1/16/2009     Years since quitting: 15.2    Smokeless tobacco: Never    Tobacco comments:     Social smoker   Vaping Use    Vaping Use: Never used   Substance and Sexual Activity    Alcohol use: Yes     Alcohol/week: 1.2 oz     Types: 1 Glasses of wine, 1 Cans of beer per week     Comment: Occasional drinker, especially since started medications    Drug use: No    Sexual activity: Yes     Partners: Male     Comment: singe       CURRENT MEDICATIONS  Home Medications       Reviewed by Nicole Myers R.N. (Registered Nurse) on 04/08/24 at 1747  Med List Status: Partial     Medication Last Dose Status   acetaminophen (TYLENOL) 500 MG Tab  Active   buPROPion (WELLBUTRIN XL) 300 MG XL tablet  Active   cetirizine (ZYRTEC) 10 MG Tab  Active   dicyclomine (BENTYL) 10 MG Cap  Active   fluticasone (FLONASE) 50 MCG/ACT nasal spray  Active   ibuprofen (MOTRIN) 200 MG Tab  Active   levothyroxine (SYNTHROID) 175 MCG Tab  Active   liothyronine (CYTOMEL) 5 MCG Tab  Active   oxyCODONE-acetaminophen (PERCOCET) 5-325 MG Tab  Active   pantoprazole (PROTONIX) 40 MG Tablet Delayed Response  Active   traZODone (DESYREL) 50 MG Tab  Active                    ALLERGIES  Allergies   Allergen Reactions    Latex Rash     rash    Morphine Hives and Itching     Torso and arms       PHYSICAL EXAM  VITAL SIGNS: BP (!) 137/90   Pulse (!) 106   Temp 36.3 °C (97.4 °F) (Temporal)   Resp 18   Ht 1.676 m (5' 6\")   Wt 105 kg (231 lb 11.3 oz)   SpO2 95%   BMI 37.40 " kg/m²    Vitals and nursing note reviewed.   Constitutional:       Comments: Patient is lying in bed supine, pleasant, conversant, speaking in complete sentences   HENT:      Head: Normocephalic and atraumatic.   Eyes:      Extraocular Movements: Extraocular movements intact.      Conjunctiva/sclera: Conjunctivae normal.      Pupils: Pupils are equal, round, and reactive to light.   Cardiovascular:      Pulses: Normal pulses.      Comments:   Pulmonary:      Effort: Pulmonary effort is normal. No respiratory distress.  Equal bilateral breath sounds, no wheezes, rales, rhonchi  Musculoskeletal:      Significant chest wall tenderness to palpation on the right, no crepitus, well-healing trocar surgical scars     General: No swelling. Normal range of motion.      Cervical back: Normal range of motion. No rigidity.   Skin:     General: Skin is warm and dry.      Capillary Refill: Capillary refill takes less than 2 seconds.   Neurological:      Mental Status: Alert.       EKG/LABS  Results for orders placed or performed during the hospital encounter of 04/08/24   CBC WITH DIFFERENTIAL   Result Value Ref Range    WBC 13.0 (H) 4.8 - 10.8 K/uL    RBC 5.44 (H) 4.20 - 5.40 M/uL    Hemoglobin 15.7 12.0 - 16.0 g/dL    Hematocrit 46.8 37.0 - 47.0 %    MCV 86.0 81.4 - 97.8 fL    MCH 28.9 27.0 - 33.0 pg    MCHC 33.5 32.2 - 35.5 g/dL    RDW 40.2 35.9 - 50.0 fL    Platelet Count 402 164 - 446 K/uL    MPV 8.6 (L) 9.0 - 12.9 fL    Neutrophils-Polys 66.80 44.00 - 72.00 %    Lymphocytes 21.10 (L) 22.00 - 41.00 %    Monocytes 7.20 0.00 - 13.40 %    Eosinophils 3.30 0.00 - 6.90 %    Basophils 0.50 0.00 - 1.80 %    Immature Granulocytes 1.10 (H) 0.00 - 0.90 %    Nucleated RBC 0.00 0.00 - 0.20 /100 WBC    Neutrophils (Absolute) 8.67 (H) 1.82 - 7.42 K/uL    Lymphs (Absolute) 2.74 1.00 - 4.80 K/uL    Monos (Absolute) 0.93 (H) 0.00 - 0.85 K/uL    Eos (Absolute) 0.43 0.00 - 0.51 K/uL    Baso (Absolute) 0.07 0.00 - 0.12 K/uL    Immature  Granulocytes (abs) 0.14 (H) 0.00 - 0.11 K/uL    NRBC (Absolute) 0.00 K/uL   Comp Metabolic Panel   Result Value Ref Range    Sodium 138 135 - 145 mmol/L    Potassium 4.3 3.6 - 5.5 mmol/L    Chloride 104 96 - 112 mmol/L    Co2 21 20 - 33 mmol/L    Anion Gap 13.0 7.0 - 16.0    Glucose 100 (H) 65 - 99 mg/dL    Bun 11 8 - 22 mg/dL    Creatinine 0.67 0.50 - 1.40 mg/dL    Calcium 9.7 8.4 - 10.2 mg/dL    Correct Calcium 9.3 8.5 - 10.5 mg/dL    AST(SGOT) 24 12 - 45 U/L    ALT(SGPT) 29 2 - 50 U/L    Alkaline Phosphatase 79 30 - 99 U/L    Total Bilirubin 0.3 0.1 - 1.5 mg/dL    Albumin 4.5 3.2 - 4.9 g/dL    Total Protein 8.3 (H) 6.0 - 8.2 g/dL    Globulin 3.8 (H) 1.9 - 3.5 g/dL    A-G Ratio 1.2 g/dL   ESTIMATED GFR   Result Value Ref Range    GFR (CKD-EPI) 107 >60 mL/min/1.73 m 2   Lactic Acid   Result Value Ref Range    Lactic Acid 0.7 0.5 - 2.0 mmol/L     I have independently interpreted this EKG    RADIOLOGY  I have independently interpreted the diagnostic imaging associated with this visit and am waiting the final reading from the radiologist.   My preliminary interpretation is as follows: No pneumothorax    Radiologist interpretation:  CT-CHEST (THORAX) WITH   Final Result      1.  Multifocal right lung atelectasis      2.  No hematoma or pneumothorax      3.  Right chest wall air consistent with recent surgery      4.  Multiple hemangioma within the liver            Fleischner Society pulmonary nodule recommendations:   Not Applicable             COURSE & MEDICAL DECISION MAKING    ASSESSMENT, COURSE AND PLAN  Care Narrative: Patient with leukocytosis, tachycardia, CT chest to evaluate for postoperative infection, bleeding, other acute process.  Sepsis orders have been placed after discovering leukocytosis, 30 cc/kg fluid bolus ordered in addition to IV antibiotics.  Dilaudid for pain control.  Lactic acid, blood cultures pending.  Disposition pending CT imaging.    Electronically signed by: Fransisco Ibrahim M.D.,  4/8/2024 10:49 PM    CT chest demonstrates no pneumothorax or hematoma but does demonstrate atelectasis and right chest wall air.  After speaking with Dr. Taylor who conducted the patient's surgery he does think it is somewhat abnormal to still have an elevated white blood cell count 7 days after with a left shift and to have right chest wall air and believes it is reasonable to admit the patient on antibiotics for evaluation in the morning.  Patient was amenable to this plan.    This dictation has been created using voice recognition software. I am continuously working with the software to minimize the number of voice recognition errors and I have made every attempt to manually correct the errors within my dictation. However errors  related to this voice recognition software may still exist and should be interpreted within the appropriate context.     Electronically signed by: Fransisco Ibrahim M.D., 4/8/2024 11:49 PM      Sepsis: Infection was suspected 2320 (Time). Sepsis pathway was initiated. 30cc/kg bolus given  Antibiotics were given per protocol.      DISPOSITION AND DISCUSSIONS  I have discussed management of the patient with the following physicians and MARCUS's: Dr. Taylor      FINAL DIAGNOSIS  1. SIRS (systemic inflammatory response syndrome) (HCC)    2. Atelectasis    3. Leukocytosis, unspecified type    4. Postoperative pain           Electronically signed by: Fransisco Ibrahim M.D., 4/8/2024 10:43 PM

## 2024-04-09 NOTE — ED NOTES
Pt notified of new labs and imagine orders.   Pt states previously given prn compazine has been effective in decreasing nausea. Pt told to be NPO for now until abd xray results return.   Xray at bedside now.

## 2024-04-09 NOTE — ED NOTES
Pt medicated for pain and nausea as ordered.  MD at bedside to discuss POC.  Pt provided w/ IS, has been using at home.

## 2024-04-09 NOTE — ED TRIAGE NOTES
Pt comes in w/ family  recent lung surgery for a tumor removal R base 4/1/2024    found a Ca tumor by accident on CT scan of abdomen  was doing well until this after noon sudden on set of severe sharp type pain to area where tumor was removed    attempted to call surgeon however was unable to get a hold of him  this is why they came here

## 2024-04-09 NOTE — ED NOTES
Pt given clear ensure and applesauce at 1145. Around 1215 pt had large amount of emesis. Pt denies any abd pain or bloating, no distention noted. Pt c/o continued nausea, prn compazine administered by assist RN. Per pt, this vomiting after any drinking started around 0800 today.   Dr Sanz notified.

## 2024-04-09 NOTE — H&P
"Hospital Medicine History & Physical Note    Date of Service  4/8/2024    Primary Care Physician  BOBBI Chisholm.    Consultants  General Surgery: ERP discussed this case with Dr. Taylor    Code Status  Full Code    Chief Complaint  Chief Complaint   Patient presents with    Pain     Recent lung surgery 4/1/2024  removed a tumor  today around 4 pain started having severe sharp pain to site of surgery          History of Presenting Illness  Ifrah Brunson is a 49 y.o. female, who underwent bronchoscopy, right thorascopic wedge resection and mediastinal lymph node resection for mucinous lymphadenectomy by Dr. Taylor on 4/1/20/2024.  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  She was doing well postoperatively however yesterday developed severe pain to her right chest, that is started around 4 PM.  She also endorses shortness of breath and describes sensation of \"having a bubble in my chest \"..  Pain is pleuritic and significantly worse with coughing and movement.  She comes to the emergency department on 4/8/2024 for further evaluation.      I discussed the plan of care with patient and ERP Dr. Ibrahim .    Review of Systems  Review of Systems   Constitutional:  Negative for fever.   HENT:  Negative for congestion and sore throat.    Eyes:  Negative for blurred vision and double vision.   Respiratory:  Positive for shortness of breath. Negative for cough.    Cardiovascular:  Positive for chest pain. Negative for palpitations.   Gastrointestinal:  Negative for nausea and vomiting.   Genitourinary:  Negative for dysuria and urgency.   Musculoskeletal:  Negative for myalgias and neck pain.   Skin:  Negative for itching and rash.   Neurological:  Negative for dizziness, weakness and headaches.   Endo/Heme/Allergies:  Does not bruise/bleed easily. "   Psychiatric/Behavioral:  Negative for depression. The patient does not have insomnia.    All other systems reviewed and are negative.      Past Medical History   has a past medical history of Bowel habit changes, Cancer (HCC) (2012), Hiatus hernia syndrome (2022), High cholesterol (2023), Indigestion (2022), PONV (postoperative nausea and vomiting) (2010 and 2012), Post-surgical hypothyroidism (01/01/2012), Psychiatric problem (1990), and Snoring.    Surgical History   has a past surgical history that includes cholecystectomy (01/01/2009); gyn surgery (2016); other (2012); craniotomy (Right, 05/18/2023); pr thoracoscopy,dx no bx (Right, 4/1/2024); and pr tube thoracostomy includes water seal (Right, 4/1/2024).     Family History  family history includes Arterial Aneurysm in her father; Breast Cancer in her maternal grandmother; Cancer in her maternal grandmother, maternal uncle, and paternal uncle; Colorectal Cancer in her paternal uncle; Diabetes in her father and paternal grandmother; Heart Disease in her father, maternal grandfather, and paternal grandfather; Hyperlipidemia in her father, maternal grandfather, mother, and paternal grandfather; Hypertension in her maternal grandfather and paternal grandfather; Psychiatric Illness in her father.   Family history reviewed with patient. There is no family history that is pertinent to the chief complaint.     Social History   reports that she quit smoking about 15 years ago. Her smoking use included cigarettes. She has never used smokeless tobacco. She reports current alcohol use of about 1.2 oz of alcohol per week. She reports that she does not use drugs.    Allergies  Allergies   Allergen Reactions    Latex Rash     rash    Morphine Hives and Itching     Torso and arms       Medications  Prior to Admission Medications   Prescriptions Last Dose Informant Patient Reported? Taking?   acetaminophen (TYLENOL) 500 MG Tab  Patient Yes No   Sig: Take 1,000 mg by mouth 3  times a day as needed for Moderate Pain.   buPROPion (WELLBUTRIN XL) 300 MG XL tablet  Patient No No   Sig: Take 1 Tablet by mouth every morning.   cetirizine (ZYRTEC) 10 MG Tab  Patient Yes No   Sig: Take 10 mg by mouth every morning.   dicyclomine (BENTYL) 10 MG Cap  Patient Yes No   Sig: Take 10 mg by mouth every 6 hours as needed (abddomen cramping).   fluticasone (FLONASE) 50 MCG/ACT nasal spray  Patient Yes No   Sig: Administer 1 Spray into affected nostril(S) 1 time a day as needed (allergies).   ibuprofen (MOTRIN) 200 MG Tab  Patient Yes No   Sig: Take 400 mg by mouth every 8 hours as needed for Mild Pain.   levothyroxine (SYNTHROID) 175 MCG Tab  Patient Yes No   Sig: Take 175 mcg by mouth see administration instructions. Hold on Sunday, take all other days   liothyronine (CYTOMEL) 5 MCG Tab  Patient Yes No   Sig: Take 5 mcg by mouth every morning.   oxyCODONE-acetaminophen (PERCOCET) 5-325 MG Tab   No No   Sig: Take 1 Tablet by mouth every four hours as needed for Moderate Pain for up to 5 days.   pantoprazole (PROTONIX) 40 MG Tablet Delayed Response  Patient No No   Sig: Take 1 Tablet by mouth every day.   traZODone (DESYREL) 50 MG Tab  Patient No No   Sig: Take 1 Tablet by mouth at bedtime as needed for Sleep.      Facility-Administered Medications: None       Physical Exam  Temp:  [36.3 °C (97.4 °F)] 36.3 °C (97.4 °F)  Pulse:  [106] 106  Resp:  [18] 18  BP: (137)/(90) 137/90  SpO2:  [95 %] 95 %  Blood Pressure: (!) 137/90   Temperature: 36.3 °C (97.4 °F)   Pulse: (!) 106   Respiration: 18   Pulse Oximetry: 95 %       Physical Exam  Constitutional:       Appearance: Normal appearance.   HENT:      Head: Normocephalic and atraumatic.      Mouth/Throat:      Mouth: Mucous membranes are moist.      Pharynx: Oropharynx is clear.   Eyes:      Extraocular Movements: Extraocular movements intact.      Pupils: Pupils are equal, round, and reactive to light.   Cardiovascular:      Rate and Rhythm: Normal rate and  regular rhythm.      Heart sounds: Normal heart sounds.   Pulmonary:      Effort: Pulmonary effort is normal. No respiratory distress.      Breath sounds: Normal breath sounds. No wheezing.   Abdominal:      General: Abdomen is flat. Bowel sounds are normal.      Palpations: Abdomen is soft.   Musculoskeletal:      Cervical back: Normal range of motion and neck supple.      Comments: Reproducible chest pain to the right lower chest lateral and under her breast.  No crepitance felt.   Skin:     General: Skin is warm and dry.   Neurological:      General: No focal deficit present.      Mental Status: She is alert and oriented to person, place, and time.   Psychiatric:         Mood and Affect: Mood normal.         Behavior: Behavior normal.         Laboratory:  Recent Labs     04/08/24 2110   WBC 13.0*   RBC 5.44*   HEMOGLOBIN 15.7   HEMATOCRIT 46.8   MCV 86.0   MCH 28.9   MCHC 33.5   RDW 40.2   PLATELETCT 402   MPV 8.6*     Recent Labs     04/08/24 2110   SODIUM 138   POTASSIUM 4.3   CHLORIDE 104   CO2 21   GLUCOSE 100*   BUN 11   CREATININE 0.67   CALCIUM 9.7     Recent Labs     04/08/24 2110   ALTSGPT 29   ASTSGOT 24   ALKPHOSPHAT 79   TBILIRUBIN 0.3   GLUCOSE 100*             Imaging:  CT-CHEST (THORAX) WITH   Final Result      1.  Multifocal right lung atelectasis      2.  No hematoma or pneumothorax      3.  Right chest wall air consistent with recent surgery      4.  Multiple hemangioma within the liver            Fleischner Society pulmonary nodule recommendations:   Not Applicable             X-Ray:  I have personally reviewed the images and compared with prior images. and My impression is: CT of the chest is negative for hematoma or pneumothorax.  She has atelectasis in right chest wall air.    Assessment/Plan:  Justification for Admission Status  I anticipate this patient will require at least two midnights for appropriate medical management, necessitating inpatient admission because 49-year-old female,  who underwent bronchoscopy, right thorascopic wedge resection and mediastinal lymph node resection for mucinous lymphadenectomy by Dr. Taylor on 4/1/20/2024, coming with acute onset of right sided chest pain.  Has SIRS        * SIRS (systemic inflammatory response syndrome) (HCC)- (present on admission)  Assessment & Plan  Inpatient status to medical floor.  -Patient with history of right thorascopic wedge resection for mucinous lymphadenectomy by Dr. Taylor on 4/1/20/2024.   SIRS criteria identified on my evaluation include:  Tachycardia, with heart rate greater than 90 BPM, Tachypnea, with respirations greater than 20 per minute, and Leukocytosis, with WBC greater than 12,000  SIRS under the context of recent procedure.  General surgery recommending IV antibiotics which I ordered.  She does not have any endorgan dysfunction therefore not septic per sepsis 3 criteria on admission.  Added procalcitonin, will closely monitor laboratory in the morning.  I appreciate general surgery consult and recommendations.  ERP discussed with Dr. Taylor.      Pain, acute postoperative  Assessment & Plan  -Underwent bronchoscopy, right thorascopic wedge and mediastinal lymph node resection for mucinous lymphadenectomy by Dr. Taylor on 4/1/20/2024.  Patient was doing well postoperatively, till today developing pain around 4 PM.  -Today POD #7  -CT of the chest imaging is negative for pneumothorax or hematomas.  She does have right chest wall air that is consistent with recent surgery however pain pattern is unusual.  -ERP discussed this case with Dr. Taylor.  He is recommending hospitalization for close monitoring and IV antibiotics therapy which was added.  -Additionally, patient is requiring IV narcotics for pain control.  -I appreciate general surgery consult and recommendation, ERP discussed with Dr. Taylor.    Liver hemangioma  Assessment & Plan  -Redemonstration of hypodense lesions within the liver.  She is following  outpatient.    Anxiety and depression- (present on admission)  Assessment & Plan  -Patient takes bupropion and trazodone which we will continue.    Post-surgical hypothyroidism- (present on admission)  Assessment & Plan  -Continue levothyroxine and Cytomel        VTE prophylaxis: SCDs/TEDs

## 2024-04-09 NOTE — ASSESSMENT & PLAN NOTE
Inpatient status to medical floor.  -Patient with history of right thorascopic wedge resection for mucinous lymphadenectomy by Dr. Taylor on 4/1/20/2024.   SIRS criteria identified on my evaluation include:  Tachycardia, with heart rate greater than 90 BPM, Tachypnea, with respirations greater than 20 per minute, and Leukocytosis, with WBC greater than 12,000  SIRS under the context of recent procedure.  General surgery recommending IV antibiotics which I ordered.  She does not have any endorgan dysfunction therefore not septic per sepsis 3 criteria on admission.  Added procalcitonin, will closely monitor laboratory in the morning.    Dr. Taylor evaluate patient, no acute surgery needed  WBC 10.7, continue IV Unasyn at the moment  Unclear if patient has right flank pain, may need to consider renal colic CT scan

## 2024-04-09 NOTE — ASSESSMENT & PLAN NOTE
-Underwent bronchoscopy, right thorascopic wedge and mediastinal lymph node resection for mucinous lymphadenectomy by Dr. Taylor on 4/1/20/2024.  Patient was doing well postoperatively, till today developing pain around 4 PM.  -Today POD #7  -CT of the chest imaging is negative for pneumothorax or hematomas.  She does have right chest wall air that is consistent with recent surgery however pain pattern is unusual.    -Continue IV Dilaudid, alternate with IV Toradol  -IV Robaxin ordered by surgery

## 2024-04-09 NOTE — ED NOTES
Medication history reviewed with pt. Med rec is complete.  Allergies reviewed, per pt  Interviewed pt with  at bedside with permission from pt.    Pt reports that she never took her PERCOCET 5-325MG that was prescribed to her on 4/2/2024    Patient has not had any outpatient antibiotics in the last 30 days.    Pt is not on any anticoagulants

## 2024-04-10 ENCOUNTER — PHARMACY VISIT (OUTPATIENT)
Dept: PHARMACY | Facility: MEDICAL CENTER | Age: 49
End: 2024-04-10
Payer: COMMERCIAL

## 2024-04-10 VITALS
TEMPERATURE: 98.1 F | HEIGHT: 66 IN | BODY MASS INDEX: 38.27 KG/M2 | WEIGHT: 238.1 LBS | HEART RATE: 92 BPM | DIASTOLIC BLOOD PRESSURE: 78 MMHG | OXYGEN SATURATION: 90 % | RESPIRATION RATE: 16 BRPM | SYSTOLIC BLOOD PRESSURE: 139 MMHG

## 2024-04-10 PROBLEM — R51.9 HEADACHE: Status: RESOLVED | Noted: 2024-04-09 | Resolved: 2024-04-10

## 2024-04-10 PROCEDURE — 700105 HCHG RX REV CODE 258: Performed by: STUDENT IN AN ORGANIZED HEALTH CARE EDUCATION/TRAINING PROGRAM

## 2024-04-10 PROCEDURE — A9270 NON-COVERED ITEM OR SERVICE: HCPCS | Performed by: INTERNAL MEDICINE

## 2024-04-10 PROCEDURE — 99239 HOSP IP/OBS DSCHRG MGMT >30: CPT | Performed by: INTERNAL MEDICINE

## 2024-04-10 PROCEDURE — 700105 HCHG RX REV CODE 258: Performed by: HOSPITALIST

## 2024-04-10 PROCEDURE — 700102 HCHG RX REV CODE 250 W/ 637 OVERRIDE(OP): Performed by: INTERNAL MEDICINE

## 2024-04-10 PROCEDURE — 700111 HCHG RX REV CODE 636 W/ 250 OVERRIDE (IP): Mod: JZ | Performed by: HOSPITALIST

## 2024-04-10 PROCEDURE — A9270 NON-COVERED ITEM OR SERVICE: HCPCS | Performed by: HOSPITALIST

## 2024-04-10 PROCEDURE — RXMED WILLOW AMBULATORY MEDICATION CHARGE: Performed by: INTERNAL MEDICINE

## 2024-04-10 PROCEDURE — 700111 HCHG RX REV CODE 636 W/ 250 OVERRIDE (IP): Performed by: STUDENT IN AN ORGANIZED HEALTH CARE EDUCATION/TRAINING PROGRAM

## 2024-04-10 PROCEDURE — 700102 HCHG RX REV CODE 250 W/ 637 OVERRIDE(OP): Performed by: HOSPITALIST

## 2024-04-10 RX ORDER — METHOCARBAMOL 750 MG/1
750 TABLET, FILM COATED ORAL 3 TIMES DAILY PRN
Qty: 30 TABLET | Refills: 0 | Status: SHIPPED | OUTPATIENT
Start: 2024-04-10 | End: 2024-04-20

## 2024-04-10 RX ORDER — AMOXICILLIN AND CLAVULANATE POTASSIUM 875; 125 MG/1; MG/1
1 TABLET, FILM COATED ORAL 2 TIMES DAILY
Qty: 14 TABLET | Refills: 0 | Status: ACTIVE | OUTPATIENT
Start: 2024-04-10 | End: 2024-04-17

## 2024-04-10 RX ORDER — ONDANSETRON 4 MG/1
8 TABLET, ORALLY DISINTEGRATING ORAL EVERY 8 HOURS PRN
Qty: 30 TABLET | Refills: 0 | Status: SHIPPED | OUTPATIENT
Start: 2024-04-10 | End: 2024-04-20

## 2024-04-10 RX ADMIN — OMEPRAZOLE 20 MG: 20 CAPSULE, DELAYED RELEASE ORAL at 05:12

## 2024-04-10 RX ADMIN — LEVOTHYROXINE SODIUM 175 MCG: 0.17 TABLET ORAL at 05:12

## 2024-04-10 RX ADMIN — METHOCARBAMOL 1000 MG: 100 INJECTION, SOLUTION INTRAMUSCULAR; INTRAVENOUS at 06:07

## 2024-04-10 RX ADMIN — METHOCARBAMOL 1000 MG: 100 INJECTION, SOLUTION INTRAMUSCULAR; INTRAVENOUS at 00:01

## 2024-04-10 RX ADMIN — AMPICILLIN AND SULBACTAM 3 G: 1; 2 INJECTION, POWDER, FOR SOLUTION INTRAMUSCULAR; INTRAVENOUS at 05:12

## 2024-04-10 RX ADMIN — LIOTHYRONINE SODIUM 5 MCG: 5 TABLET ORAL at 05:11

## 2024-04-10 RX ADMIN — DOCUSATE SODIUM 50MG AND SENNOSIDES 8.6MG 2 TABLET: 8.6; 5 TABLET, FILM COATED ORAL at 05:11

## 2024-04-10 RX ADMIN — BUPROPION HYDROCHLORIDE 300 MG: 150 TABLET, FILM COATED, EXTENDED RELEASE ORAL at 05:11

## 2024-04-10 ASSESSMENT — FIBROSIS 4 INDEX: FIB4 SCORE: 0.79

## 2024-04-10 ASSESSMENT — PAIN DESCRIPTION - PAIN TYPE: TYPE: ACUTE PAIN

## 2024-04-10 NOTE — PROGRESS NOTES
Hospital Medicine Daily Progress Note    Date of Service  4/9/2024    Chief Complaint  Ifrah Brunson is a 49 y.o. female admitted 4/8/2024 with   Chief Complaint   Patient presents with    Pain     Recent lung surgery 4/1/2024  removed a tumor  today around 4 pain started having severe sharp pain to site of surgery        Hospital Course  No notes on file    Interval Problem Update  Patient stated she had ongoing right flank pain.  She stated she has not had a bowel movement in the last 2 days.  Patient was complaining of severe headache.    -I checked abdominal x-ray today, no SBO noted  - I reviewed CT chest scan, right lung atelectasis, no pneumothorax or hematoma.  Chronic to hemangioma of the liver.  -WBC 10.7, potassium 3.9, creatinine 0.63, lipase 22.  -UA showing dehydration, few bacteria, small RBCs. Consider CT A/P renal colic.    I have discussed this patient's plan of care and discharge plan at IDT rounds today with Case Management, Nursing, Nursing leadership, and other members of the IDT team.    Consultants/Specialty  general surgery    Code Status  Full Code    Disposition  The patient is not medically cleared for discharge to home or a post-acute facility.      I have placed the appropriate orders for post-discharge needs.    Review of Systems  Review of Systems   Respiratory:          Right lateral thorax pain+   Gastrointestinal:  Positive for abdominal pain and constipation.        Physical Exam  Temp:  [36.3 °C (97.3 °F)-36.9 °C (98.4 °F)] 36.7 °C (98.1 °F)  Pulse:  [71-99] 99  Resp:  [15-22] 18  BP: (116-149)/(61-88) 132/83  SpO2:  [90 %-96 %] 90 %    Physical Exam  Vitals and nursing note reviewed.   Constitutional:       General: She is not in acute distress.     Appearance: Normal appearance. She is not ill-appearing.   HENT:      Head: Normocephalic and atraumatic.   Eyes:      General: No scleral icterus.     Extraocular Movements: Extraocular movements intact.    Cardiovascular:      Rate and Rhythm: Normal rate.      Pulses: Normal pulses.      Heart sounds: Normal heart sounds. No murmur heard.  Pulmonary:      Effort: Pulmonary effort is normal. No respiratory distress.      Breath sounds: Normal breath sounds.   Chest:      Comments: Right lateral surgical sites intact, + yellow contusion to right hip.  Abdominal:      General: Abdomen is flat. Bowel sounds are normal. There is no distension.      Palpations: Abdomen is soft.   Musculoskeletal:         General: No swelling or tenderness. Normal range of motion.      Cervical back: Normal range of motion and neck supple.   Skin:     General: Skin is warm.      Capillary Refill: Capillary refill takes less than 2 seconds.      Coloration: Skin is not jaundiced.      Findings: No erythema.   Neurological:      General: No focal deficit present.      Mental Status: She is alert and oriented to person, place, and time. Mental status is at baseline.      Motor: No weakness.   Psychiatric:         Mood and Affect: Mood normal.         Behavior: Behavior normal.         Thought Content: Thought content normal.         Judgment: Judgment normal.         Fluids    Intake/Output Summary (Last 24 hours) at 4/9/2024 2141  Last data filed at 4/9/2024 1128  Gross per 24 hour   Intake 2300 ml   Output --   Net 2300 ml       Laboratory  Recent Labs     04/08/24 2110 04/09/24  0318   WBC 13.0* 10.7   RBC 5.44* 4.47   HEMOGLOBIN 15.7 13.0   HEMATOCRIT 46.8 39.3   MCV 86.0 87.9   MCH 28.9 29.1   MCHC 33.5 33.1   RDW 40.2 41.8   PLATELETCT 402 286   MPV 8.6* 8.5*     Recent Labs     04/08/24 2110 04/09/24  0318   SODIUM 138 138   POTASSIUM 4.3 3.9   CHLORIDE 104 105   CO2 21 23   GLUCOSE 100* 113*   BUN 11 9   CREATININE 0.67 0.63   CALCIUM 9.7 8.6     Recent Labs     04/08/24 2110   INR 0.94               Imaging  [id number] VIEW   Final Result      No evidence of bowel obstruction.      CT-CHEST (THORAX) WITH   Final Result      1.   Multifocal right lung atelectasis      2.  No hematoma or pneumothorax      3.  Right chest wall air consistent with recent surgery      4.  Multiple hemangioma within the liver            Fleischner Society pulmonary nodule recommendations:   Not Applicable              Assessment/Plan  * SIRS (systemic inflammatory response syndrome) (HCC)- (present on admission)  Assessment & Plan  Inpatient status to medical floor.  -Patient with history of right thorascopic wedge resection for mucinous lymphadenectomy by Dr. Taylor on 4/1/20/2024.   SIRS criteria identified on my evaluation include:  Tachycardia, with heart rate greater than 90 BPM, Tachypnea, with respirations greater than 20 per minute, and Leukocytosis, with WBC greater than 12,000  SIRS under the context of recent procedure.  General surgery recommending IV antibiotics which I ordered.  She does not have any endorgan dysfunction therefore not septic per sepsis 3 criteria on admission.  Added procalcitonin, will closely monitor laboratory in the morning.    Dr. Taylor evaluate patient, no acute surgery needed  WBC 10.7, continue IV Unasyn at the moment  Unclear if patient has right flank pain, may need to consider renal colic CT scan      Headache- (present on admission)  Assessment & Plan  Patient has not been able to eat or drink coffee.  This may be a reason for her headache.  Started IV Toradol  Has a history of migraines, will start sumatriptan as needed    Pain, acute postoperative- (present on admission)  Assessment & Plan  -Underwent bronchoscopy, right thorascopic wedge and mediastinal lymph node resection for mucinous lymphadenectomy by Dr. Taylor on 4/1/20/2024.  Patient was doing well postoperatively, till today developing pain around 4 PM.  -Today POD #7  -CT of the chest imaging is negative for pneumothorax or hematomas.  She does have right chest wall air that is consistent with recent surgery however pain pattern is unusual.    -Continue IV  Dilaudid, alternate with IV Toradol  -IV Robaxin ordered by surgery    Liver hemangioma- (present on admission)  Assessment & Plan  CT scan shows ongoing liver hemangiomas  Continue outpatient follow-up    Class 2 obesity due to excess calories without serious comorbidity with body mass index (BMI) of 38.0 to 38.9 in adult- (present on admission)  Assessment & Plan  When healed from surgery, would recommend lifestyle modifications, weight loss    Gastroesophageal reflux disease without esophagitis- (present on admission)  Assessment & Plan  Continue omeprazole in place of Protonix    Anxiety and depression- (present on admission)  Assessment & Plan  Continue home bupropion and trazodone    Post-surgical hypothyroidism- (present on admission)  Assessment & Plan  Continue home liothyronine and Synthroid         VTE prophylaxis:   SCDs/TEDs      I have performed a physical exam and reviewed and updated ROS and Plan today (4/9/2024). In review of yesterday's note (4/8/2024), there are no changes except as documented above.      Total time spent 51 minutes. I spent greater than 50% of the time for patient care, including unit/floor time, multiple face-to-face encounters with the patient, counseling on treatment plan and discussion with bedside RN.  Further, I spent time on my own review of patient's overnight events, RN notes, imaging and lab analysis, and developing my assessment and plan above.  In addition, working with , social workers, and Charge RN on case coordination on this date.

## 2024-04-10 NOTE — PROGRESS NOTES
4 Eyes Skin Assessment Completed by SILVER Wright and SILVER Plascencia.    Head WDL  Ears WDL  Nose WDL  Mouth WDL  Neck WDL  Breast/Chest WDL  Shoulder Blades 4 lap sites on R side of back  Spine WDL  (R) Arm/Elbow/Hand WDL  (L) Arm/Elbow/Hand WDL  Abdomen WDL  Groin WDL  Scrotum/Coccyx/Buttocks WDL  (R) Leg WDL  (L) Leg WDL  (R) Heel/Foot/Toe WDL  (L) Heel/Foot/Toe WDL      4 lap sites on R back        Devices In Places Blood Pressure Cuff      Interventions In Place Pillows    Possible Skin Injury No    Pictures Uploaded Into Epic Yes  Wound Consult Placed N/A  RN Wound Prevention Protocol Ordered No

## 2024-04-10 NOTE — ASSESSMENT & PLAN NOTE
Patient has not been able to eat or drink coffee.  This may be a reason for her headache.  Started IV Toradol  Has a history of migraines, will start sumatriptan as needed

## 2024-04-10 NOTE — DISCHARGE SUMMARY
"Discharge Summary    CHIEF COMPLAINT ON ADMISSION  Chief Complaint   Patient presents with    Pain     Recent lung surgery 4/1/2024  removed a tumor  today around 4 pain started having severe sharp pain to site of surgery          Reason for Admission  Post-Op Pain     Admission Date  4/8/2024    CODE STATUS  Full Code    HPI & HOSPITAL COURSE  This is \"Ifrah Brunson is a 49 y.o. female, who underwent bronchoscopy, right thorascopic wedge resection and mediastinal lymph node resection for mucinous lymphadenectomy by Dr. Taylor on 4/1/20/2024.  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  She was doing well postoperatively however yesterday developed severe pain to her right chest, that is started around 4 PM.  She also endorses shortness of breath and describes sensation of \"having a bubble in my chest \"..  Pain is pleuritic and significantly worse with coughing and movement.  She comes to the emergency department on 4/8/2024 for further evaluation.\" (As per admitting physician Dr. Guerra on H&P).    After admission, patient had ongoing right flank pain.  She stated she has not had a bowel movement in the last 2 days. Patient was complaining of severe headache.  I reviewed CT chest scan, right lung atelectasis, no pneumothorax or hematoma. Chronic to hemangioma of the liver.  Abdominal XR showed patient has right sided constipation.  -WBC 10.7, potassium 3.9, creatinine 0.63, lipase 22.  -UA showing dehydration, few bacteria, small RBCs.    Patient was able to have improvement after receiving IV Toradol, Robaxin and IV Unasyn.  Upon discharge, patient had resolution of all her symptoms.  I had discussed with Dr. Taylor, he recommended to continue on Augmentin and Robaxin.  Patient received her medications via meds to beds and was able to discharge with " improvement.      Therefore, she is discharged in good and stable condition to home with close outpatient follow-up.    The patient met 2-midnight criteria for an inpatient stay at the time of discharge.    Discharge Date  04/10/2024    FOLLOW UP ITEMS POST DISCHARGE  With PCP, Dr. Taylor    DISCHARGE DIAGNOSES  Principal Problem:    SIRS (systemic inflammatory response syndrome) (HCC) (POA: Yes)  Active Problems:    Pain, acute postoperative (POA: Yes)    Post-surgical hypothyroidism (POA: Yes)      Overview: Dr. Oseas Naranjo (endocrinology)    Anxiety and depression (POA: Yes)    Gastroesophageal reflux disease without esophagitis (POA: Yes)    Class 2 obesity due to excess calories without serious comorbidity with body mass index (BMI) of 38.0 to 38.9 in adult (POA: Yes)    Liver hemangioma (POA: Yes)  Resolved Problems:    Headache (POA: Yes)      FOLLOW UP  Future Appointments   Date Time Provider Department Center   7/2/2024  9:00 AM Nadira Lora M.D. [facility] None     No follow-up provider specified.    MEDICATIONS ON DISCHARGE     Medication List        START taking these medications        Instructions   amoxicillin-clavulanate 875-125 MG Tabs  Commonly known as: Augmentin   Take 1 Tablet by mouth 2 times a day for 7 days.  Dose: 1 Tablet     methocarbamol 750 MG Tabs  Commonly known as: Robaxin   Take 1 Tablet by mouth 3 times a day as needed (muscle pains, spasms) for up to 10 days.  Dose: 750 mg     ondansetron 4 MG Tbdp  Commonly known as: Zofran ODT   Take 2 Tablets by mouth every 8 hours as needed for Nausea/Vomiting (give PO if no IV route available) for up to 10 days.  Dose: 8 mg            CONTINUE taking these medications        Instructions   acetaminophen 500 MG Tabs  Commonly known as: Tylenol   Take 1,000 mg by mouth 3 times a day as needed for Moderate Pain.  Dose: 1,000 mg     buPROPion 300 MG XL tablet  Commonly known as: Wellbutrin XL   Take 1 Tablet by mouth every morning.  Dose:  300 mg     cetirizine 10 MG Tabs  Commonly known as: ZyrTEC   Take 10 mg by mouth every morning.  Dose: 10 mg     fluticasone 50 MCG/ACT nasal spray  Commonly known as: Flonase   Administer 1 Spray into affected nostril(S) 1 time a day as needed (allergies).  Dose: 1 Spray     ibuprofen 200 MG Tabs  Commonly known as: Motrin   Take 600 mg by mouth every 8 hours as needed for Mild Pain.  Dose: 600 mg     levothyroxine 175 MCG Tabs  Commonly known as: Synthroid   Take 175 mcg by mouth see administration instructions. Hold on Sunday, take all other days  Dose: 175 mcg     liothyronine 5 MCG Tabs  Commonly known as: Cytomel   Take 5 mcg by mouth every morning.  Dose: 5 mcg     pantoprazole 40 MG Tbec  Commonly known as: Protonix   Take 1 Tablet by mouth every day.  Dose: 40 mg     traZODone 50 MG Tabs  Commonly known as: Desyrel   Take 1 Tablet by mouth at bedtime as needed for Sleep.  Dose: 50 mg            STOP taking these medications      oxyCODONE-acetaminophen 5-325 MG Tabs  Commonly known as: Percocet              Allergies  Allergies   Allergen Reactions    Latex Rash     rash    Morphine Hives and Itching     Torso and arms       DIET  Orders Placed This Encounter   Procedures    Diet Order Diet: Cardiac     Standing Status:   Standing     Number of Occurrences:   1     Order Specific Question:   Diet:     Answer:   Cardiac [6]       ACTIVITY  As tolerated.  Weight bearing as tolerated    CONSULTATIONS  General Surgeon Dr. Taylor    PROCEDURES  none    LABORATORY  Lab Results   Component Value Date    SODIUM 138 04/09/2024    POTASSIUM 3.9 04/09/2024    CHLORIDE 105 04/09/2024    CO2 23 04/09/2024    GLUCOSE 113 (H) 04/09/2024    BUN 9 04/09/2024    CREATININE 0.63 04/09/2024    GLOMRATE 83 01/03/2022        Lab Results   Component Value Date    WBC 10.7 04/09/2024    HEMOGLOBIN 13.0 04/09/2024    HEMATOCRIT 39.3 04/09/2024    PLATELETCT 286 04/09/2024      [id number] VIEW   Final Result      No evidence of  bowel obstruction.      CT-CHEST (THORAX) WITH   Final Result      1.  Multifocal right lung atelectasis      2.  No hematoma or pneumothorax      3.  Right chest wall air consistent with recent surgery      4.  Multiple hemangioma within the liver            Fleischner Society pulmonary nodule recommendations:   Not Applicable             Total time of the discharge process exceeds 33 minutes.

## 2024-04-10 NOTE — CARE PLAN
The patient is Stable - Low risk of patient condition declining or worsening    Shift Goals  Clinical Goals: Discharge, monitor pain  Patient Goals: Comfort    Progress made toward(s) clinical / shift goals:  Pt AxO 4, pt denies pain. Pending discharge today    Problem: Pain - Standard  Goal: Alleviation of pain or a reduction in pain to the patient’s comfort goal  Outcome: Progressing     Problem: Knowledge Deficit - Standard  Goal: Patient and family/care givers will demonstrate understanding of plan of care, disease process/condition, diagnostic tests and medications  Outcome: Progressing     Problem: Hemodynamics  Goal: Patient's hemodynamics, fluid balance and neurologic status will be stable or improve  Outcome: Progressing       Patient is not progressing towards the following goals:

## 2024-04-10 NOTE — CARE PLAN
The patient is Stable - Low risk of patient condition declining or worsening    Shift Goals  Clinical Goals: IV ABx, Pain Management  Patient Goals: Comfort    Progress made toward(s) clinical / shift goals:    Problem: Pain - Standard  Goal: Alleviation of pain or a reduction in pain to the patient’s comfort goal  4/9/2024 2236 by Urbano Ann R.N.  Outcome: Progressing  4/9/2024 2236 by Urbano Ann R.N.  Outcome: Progressing     Problem: Knowledge Deficit - Standard  Goal: Patient and family/care givers will demonstrate understanding of plan of care, disease process/condition, diagnostic tests and medications  4/9/2024 2236 by Urbano Ann R.N.  Outcome: Progressing  4/9/2024 2236 by EUGENE NugentN.  Outcome: Progressing     Problem: Hemodynamics  Goal: Patient's hemodynamics, fluid balance and neurologic status will be stable or improve  Outcome: Progressing     Problem: Fluid Volume  Goal: Fluid volume balance will be maintained  Outcome: Progressing     Problem: Respiratory  Goal: Patient will achieve/maintain optimum respiratory ventilation and gas exchange  Outcome: Progressing       Patient is not progressing towards the following goals:

## 2024-04-10 NOTE — CARE PLAN
The patient is Stable - Low risk of patient condition declining or worsening    Shift Goals  Clinical Goals: IV ABx, Pain Management  Patient Goals: Comfort    Progress made toward(s) clinical / shift goals:    Problem: Pain - Standard  Goal: Alleviation of pain or a reduction in pain to the patient’s comfort goal  Outcome: Progressing     Problem: Knowledge Deficit - Standard  Goal: Patient and family/care givers will demonstrate understanding of plan of care, disease process/condition, diagnostic tests and medications  Outcome: Progressing     Problem: Hemodynamics  Goal: Patient's hemodynamics, fluid balance and neurologic status will be stable or improve  Outcome: Progressing     Problem: Fluid Volume  Goal: Fluid volume balance will be maintained  Outcome: Progressing     Problem: Respiratory  Goal: Patient will achieve/maintain optimum respiratory ventilation and gas exchange  Outcome: Progressing       Patient is not progressing towards the following goals:

## 2024-04-11 ENCOUNTER — TELEPHONE (OUTPATIENT)
Dept: HEALTH INFORMATION MANAGEMENT | Facility: OTHER | Age: 49
End: 2024-04-11
Payer: COMMERCIAL

## 2024-04-14 LAB
BACTERIA BLD CULT: NORMAL
BACTERIA BLD CULT: NORMAL
SIGNIFICANT IND 70042: NORMAL
SIGNIFICANT IND 70042: NORMAL
SITE SITE: NORMAL
SITE SITE: NORMAL
SOURCE SOURCE: NORMAL
SOURCE SOURCE: NORMAL

## 2024-05-13 ENCOUNTER — OFFICE VISIT (OUTPATIENT)
Dept: MEDICAL GROUP | Facility: LAB | Age: 49
End: 2024-05-13
Payer: COMMERCIAL

## 2024-05-13 VITALS
SYSTOLIC BLOOD PRESSURE: 116 MMHG | WEIGHT: 230.8 LBS | HEIGHT: 66 IN | OXYGEN SATURATION: 94 % | DIASTOLIC BLOOD PRESSURE: 72 MMHG | TEMPERATURE: 97.9 F | RESPIRATION RATE: 14 BRPM | HEART RATE: 88 BPM | BODY MASS INDEX: 37.09 KG/M2

## 2024-05-13 DIAGNOSIS — R05.3 CHRONIC COUGH: ICD-10-CM

## 2024-05-13 PROCEDURE — 99214 OFFICE O/P EST MOD 30 MIN: CPT | Performed by: NURSE PRACTITIONER

## 2024-05-13 PROCEDURE — 3074F SYST BP LT 130 MM HG: CPT | Performed by: NURSE PRACTITIONER

## 2024-05-13 PROCEDURE — 3078F DIAST BP <80 MM HG: CPT | Performed by: NURSE PRACTITIONER

## 2024-05-13 RX ORDER — ALBUTEROL SULFATE 90 UG/1
2 AEROSOL, METERED RESPIRATORY (INHALATION) EVERY 4 HOURS PRN
Qty: 1 EACH | Refills: 3 | Status: SHIPPED | OUTPATIENT
Start: 2024-05-13

## 2024-05-13 RX ORDER — MONTELUKAST SODIUM 10 MG/1
10 TABLET ORAL DAILY
Qty: 30 TABLET | Refills: 6 | Status: SHIPPED | OUTPATIENT
Start: 2024-05-13

## 2024-05-13 ASSESSMENT — FIBROSIS 4 INDEX: FIB4 SCORE: 0.79

## 2024-05-15 NOTE — PROGRESS NOTES
Chief Complaint   Patient presents with    Cough     Lingering      Verbal consent was acquired by the patient to use [employer] ambient listening note generation during this visit Yes      HPI:  History of Present Illness  The patient presents for evaluation of cough.    Cough  The patient continues to experience a lingering cough subsequent to her surgery. A recent PET scan revealed a lung nodule in the borderline region, prompting a biopsy, which was unsuccessful due to the proximity to the diaphragm. The nodule was surgically removed on 04/01/2024. The nodule was identified as cancer that is slow-growing and nonaggressive, a condition that has occurred twice in 12 years. Post-surgery, the patient experienced severe pain, necessitating a visit to the emergency room. Was prescribed muscle relaxants and antibiotics due to concerns about potential infection growth, which she has since completed. The patient's cough is becoming less frequent, but she is unable to expectorate phlegm. She has attempted to alleviate the cough with Mucinex and over-the-counter medications, but to no avail. DayQuil provided temporary relief. The patient denies experiencing shortness of breath after excessive talking, a symptom she previously experienced until a few weeks ago. She has been performing lung exercises at home, which have been beneficial, but she experiences coughing each time. She denies wheezing but reports heartburn and is currently on pantoprazole 40 mg. The cough does not disrupt her sleep, but it is more pronounced in the morning upon waking. She occasionally expectorates clear, thick saliva. She takes an allergy pill daily. A Pulmonary Function Test (PFT) prior to surgery indicated mild asthma. She does not use an albuterol inhaler.    Results:  Results  Imaging  PET scan showed a lung nodule in the borderline region. CT scan showed atelectasis.    ROS:  As documented in history of present illness above    Exam:   BP  "116/72 (BP Location: Right arm, Patient Position: Sitting, BP Cuff Size: Adult)   Pulse 88   Temp 36.6 °C (97.9 °F)   Resp 14   Ht 1.676 m (5' 6\")   Wt 105 kg (230 lb 12.8 oz)   SpO2 94%   BMI 37.25 kg/m²     Constitutional: Alert, no distress, well-groomed.  Skin: Warm, dry, good turgor, no rashes in visible areas.  Eye: Equal, round and reactive, conjunctiva clear, lids normal.  ENMT: Lips without lesions, good dentition, moist mucous membranes.  Neck: Trachea midline, no masses, no thyromegaly.  Respiratory: Unlabored respiratory effort, no cough. Clear to ausculation. No rales, ronchi, or wheezing.  Cardiovascular: Regular rate and rhythm without murmur. Carotid and radial pulses are intact and equal bilaterally.  MSK: Normal gait, moves all extremities.  Neuro: Grossly non-focal.   Psych: Alert and oriented x3, normal affect and mood.    Assessment / Plan:  Assessment & Plan  1. Persistent cough.  The patient's cough may be attributed to bronchospasms or an allergy-induced asthma-type cough. A prescription for Singulair, to be taken once daily, has been issued. Additionally, an albuterol inhaler has been prescribed for use as needed. The patient will provide an update on her condition within the next few weeks.    "

## 2024-06-05 ENCOUNTER — APPOINTMENT (OUTPATIENT)
Dept: RADIOLOGY | Facility: MEDICAL CENTER | Age: 49
End: 2024-06-05
Attending: NURSE PRACTITIONER
Payer: COMMERCIAL

## 2024-06-05 DIAGNOSIS — Z12.31 VISIT FOR SCREENING MAMMOGRAM: ICD-10-CM

## 2024-06-05 PROCEDURE — 77063 BREAST TOMOSYNTHESIS BI: CPT

## 2024-06-17 ENCOUNTER — PATIENT MESSAGE (OUTPATIENT)
Dept: MEDICAL GROUP | Facility: LAB | Age: 49
End: 2024-06-17
Payer: COMMERCIAL

## 2024-06-17 DIAGNOSIS — R07.81 RIB PAIN: ICD-10-CM

## 2024-06-17 RX ORDER — METHOCARBAMOL 750 MG/1
750 TABLET, FILM COATED ORAL 3 TIMES DAILY
Qty: 30 TABLET | Refills: 1 | Status: SHIPPED | OUTPATIENT
Start: 2024-06-17

## 2024-07-02 ENCOUNTER — HOSPITAL ENCOUNTER (OUTPATIENT)
Dept: HEMATOLOGY ONCOLOGY | Facility: MEDICAL CENTER | Age: 49
End: 2024-07-02
Attending: STUDENT IN AN ORGANIZED HEALTH CARE EDUCATION/TRAINING PROGRAM
Payer: COMMERCIAL

## 2024-07-02 VITALS
SYSTOLIC BLOOD PRESSURE: 124 MMHG | HEART RATE: 85 BPM | HEIGHT: 66 IN | OXYGEN SATURATION: 95 % | TEMPERATURE: 98.4 F | DIASTOLIC BLOOD PRESSURE: 64 MMHG | WEIGHT: 228.84 LBS | BODY MASS INDEX: 36.78 KG/M2

## 2024-07-02 DIAGNOSIS — C7A.090 MALIGNANT CARCINOID TUMOR OF BRONCHUS AND LUNG (HCC): ICD-10-CM

## 2024-07-02 DIAGNOSIS — C7A.090 MALIGNANT CARCINOID TUMOR OF LUNG (HCC): ICD-10-CM

## 2024-07-02 PROCEDURE — 99212 OFFICE O/P EST SF 10 MIN: CPT | Performed by: STUDENT IN AN ORGANIZED HEALTH CARE EDUCATION/TRAINING PROGRAM

## 2024-07-02 PROCEDURE — 99215 OFFICE O/P EST HI 40 MIN: CPT | Performed by: STUDENT IN AN ORGANIZED HEALTH CARE EDUCATION/TRAINING PROGRAM

## 2024-07-02 ASSESSMENT — ENCOUNTER SYMPTOMS
SORE THROAT: 0
FOCAL WEAKNESS: 0
WHEEZING: 0
SPUTUM PRODUCTION: 0
ORTHOPNEA: 0
TREMORS: 0
PALPITATIONS: 0
COUGH: 1
MEMORY LOSS: 0
VOMITING: 0
DEPRESSION: 0
BRUISES/BLEEDS EASILY: 0
WEIGHT LOSS: 0
SENSORY CHANGE: 0
DIZZINESS: 0
HEARTBURN: 0
NECK PAIN: 0
ABDOMINAL PAIN: 1
BLURRED VISION: 0
SHORTNESS OF BREATH: 0
NAUSEA: 0
TINGLING: 0
HEADACHES: 0
CHILLS: 0
FEVER: 0

## 2024-07-02 ASSESSMENT — FIBROSIS 4 INDEX: FIB4 SCORE: 0.79

## 2024-07-02 ASSESSMENT — PAIN SCALES - GENERAL: PAINLEVEL: 1=MINIMAL PAIN

## 2024-07-05 ENCOUNTER — HOSPITAL ENCOUNTER (OUTPATIENT)
Dept: RADIOLOGY | Facility: MEDICAL CENTER | Age: 49
End: 2024-07-05
Attending: STUDENT IN AN ORGANIZED HEALTH CARE EDUCATION/TRAINING PROGRAM
Payer: COMMERCIAL

## 2024-07-05 DIAGNOSIS — C7A.090 MALIGNANT CARCINOID TUMOR OF LUNG (HCC): ICD-10-CM

## 2024-07-05 PROCEDURE — 700117 HCHG RX CONTRAST REV CODE 255: Performed by: STUDENT IN AN ORGANIZED HEALTH CARE EDUCATION/TRAINING PROGRAM

## 2024-07-05 PROCEDURE — 71260 CT THORAX DX C+: CPT

## 2024-07-05 RX ADMIN — IOHEXOL 100 ML: 350 INJECTION, SOLUTION INTRAVENOUS at 09:00

## 2024-07-08 ENCOUNTER — PATIENT OUTREACH (OUTPATIENT)
Dept: ONCOLOGY | Facility: MEDICAL CENTER | Age: 49
End: 2024-07-08
Payer: COMMERCIAL

## 2024-07-11 ENCOUNTER — PATIENT MESSAGE (OUTPATIENT)
Dept: MEDICAL GROUP | Facility: LAB | Age: 49
End: 2024-07-11

## 2024-07-11 ENCOUNTER — HOSPITAL ENCOUNTER (OUTPATIENT)
Dept: HEMATOLOGY ONCOLOGY | Facility: MEDICAL CENTER | Age: 49
End: 2024-07-11
Attending: STUDENT IN AN ORGANIZED HEALTH CARE EDUCATION/TRAINING PROGRAM
Payer: COMMERCIAL

## 2024-07-11 DIAGNOSIS — C7A.090 MALIGNANT CARCINOID TUMOR OF BRONCHUS AND LUNG (HCC): ICD-10-CM

## 2024-07-11 DIAGNOSIS — E89.0 POST-SURGICAL HYPOTHYROIDISM: ICD-10-CM

## 2024-07-11 DIAGNOSIS — C73 THYROID CANCER (HCC): ICD-10-CM

## 2024-07-11 PROCEDURE — 99214 OFFICE O/P EST MOD 30 MIN: CPT | Mod: 95 | Performed by: STUDENT IN AN ORGANIZED HEALTH CARE EDUCATION/TRAINING PROGRAM

## 2024-07-11 ASSESSMENT — ENCOUNTER SYMPTOMS
TREMORS: 0
FEVER: 0
WHEEZING: 0
FOCAL WEAKNESS: 0
BLURRED VISION: 0
SHORTNESS OF BREATH: 0
ABDOMINAL PAIN: 1
SENSORY CHANGE: 0
DIZZINESS: 0
VOMITING: 0
CHILLS: 0
NECK PAIN: 0
MEMORY LOSS: 0
HEARTBURN: 0
SORE THROAT: 0
PALPITATIONS: 0
COUGH: 1
HEADACHES: 0
WEIGHT LOSS: 0
ORTHOPNEA: 0
BRUISES/BLEEDS EASILY: 0
NAUSEA: 0
DEPRESSION: 0
SPUTUM PRODUCTION: 0
TINGLING: 0

## 2024-07-22 ENCOUNTER — NON-PROVIDER VISIT (OUTPATIENT)
Dept: GENETICS | Facility: MEDICAL CENTER | Age: 49
End: 2024-07-22
Payer: COMMERCIAL

## 2024-07-28 ENCOUNTER — PATIENT MESSAGE (OUTPATIENT)
Dept: MEDICAL GROUP | Facility: LAB | Age: 49
End: 2024-07-28
Payer: COMMERCIAL

## 2024-07-28 DIAGNOSIS — T75.3XXD MOTION SICKNESS, SUBSEQUENT ENCOUNTER: ICD-10-CM

## 2024-08-02 DIAGNOSIS — F41.9 ANXIETY AND DEPRESSION: ICD-10-CM

## 2024-08-02 DIAGNOSIS — F32.A ANXIETY AND DEPRESSION: ICD-10-CM

## 2024-08-02 DIAGNOSIS — K21.9 GASTROESOPHAGEAL REFLUX DISEASE WITHOUT ESOPHAGITIS: ICD-10-CM

## 2024-08-02 RX ORDER — PANTOPRAZOLE SODIUM 40 MG/1
40 TABLET, DELAYED RELEASE ORAL DAILY
Qty: 90 TABLET | Refills: 0 | Status: SHIPPED | OUTPATIENT
Start: 2024-08-02

## 2024-08-02 RX ORDER — BUPROPION HYDROCHLORIDE 300 MG/1
300 TABLET ORAL EVERY MORNING
Qty: 90 TABLET | Refills: 0 | Status: SHIPPED | OUTPATIENT
Start: 2024-08-02

## 2024-08-02 NOTE — TELEPHONE ENCOUNTER
Received request via: Pharmacy    Was the patient seen in the last year in this department? Yes5/13/24    Does the patient have an active prescription (recently filled or refills available) for medication(s) requested? No    Pharmacy Name: [facility]    Does the patient have long term Plus and need 100 day supply (blood pressure, diabetes and cholesterol meds only)? Medication is not for cholesterol, blood pressure or diabetes

## 2024-08-05 RX ORDER — SCOLOPAMINE TRANSDERMAL SYSTEM 1 MG/1
1 PATCH, EXTENDED RELEASE TRANSDERMAL
Qty: 4 PATCH | Refills: 0 | Status: SHIPPED | OUTPATIENT
Start: 2024-08-05

## 2024-08-26 ENCOUNTER — OFFICE VISIT (OUTPATIENT)
Dept: ENDOCRINOLOGY | Facility: MEDICAL CENTER | Age: 49
End: 2024-08-26
Payer: COMMERCIAL

## 2024-08-26 VITALS
HEIGHT: 65 IN | WEIGHT: 219 LBS | OXYGEN SATURATION: 95 % | BODY MASS INDEX: 36.49 KG/M2 | DIASTOLIC BLOOD PRESSURE: 74 MMHG | SYSTOLIC BLOOD PRESSURE: 132 MMHG | HEART RATE: 90 BPM

## 2024-08-26 DIAGNOSIS — E89.0 POST-SURGICAL HYPOTHYROIDISM: ICD-10-CM

## 2024-08-26 DIAGNOSIS — Z85.850 HISTORY OF THYROID CANCER: ICD-10-CM

## 2024-08-26 PROCEDURE — 3078F DIAST BP <80 MM HG: CPT

## 2024-08-26 PROCEDURE — 3075F SYST BP GE 130 - 139MM HG: CPT

## 2024-08-26 PROCEDURE — 99212 OFFICE O/P EST SF 10 MIN: CPT

## 2024-08-26 PROCEDURE — 99214 OFFICE O/P EST MOD 30 MIN: CPT

## 2024-08-26 RX ORDER — LEVOTHYROXINE SODIUM 175 MCG
175 TABLET ORAL
Qty: 90 TABLET | Refills: 3 | Status: SHIPPED | OUTPATIENT
Start: 2024-08-26

## 2024-08-26 RX ORDER — LIOTHYRONINE SODIUM 5 UG/1
5 TABLET ORAL DAILY
Qty: 90 TABLET | Refills: 3 | Status: SHIPPED | OUTPATIENT
Start: 2024-08-26

## 2024-08-26 ASSESSMENT — FIBROSIS 4 INDEX: FIB4 SCORE: 0.79

## 2024-08-26 NOTE — PROGRESS NOTES
CHIEF COMPLAINT: Followup of postsurgical hypothyroidism and papillary thyroid carcinoma.    HPI:   Ifrah Brunson is a 49 y.o. female, who had initial total thyroidectomy on Oct 2012 by Dr. Harris with pathology revealing classic PTC, 2.7 cm in greatest dimension, 5/28 lymph nodes involved, margins uninvolved, capsular invasion present, no extra thyroidal extension .    She did not have ZAVALETA post thyroidectomy as she was told she a was low risk.     She did  have problems with postoperative hypocalcemia due to having one of the Parathyroid glands removed.  Underwent thyroid ultrasound on 07/14/2023. Numerous lymph nodes were noted. She had FNA of 2 suspicious lymph nodes earlier this year, no evidence of malignancy. TG was undetectable in the aspirates.     She has history of lung neuroendocrine carcinoid tumor. She had surgical removal in April 2024. This is followed by oncology.     Neck ultrasound was last done on 2/22/24 with no evidence of recurrence.     She reports taking calcium and antihistamine with synthroid    Latest Reference Range & Units 02/23/24 09:30   Thyroglobulin by LC-MC/MS-S/P 1.3 - 31.8 ng/mL Not Applicable   Thyroglobulin 1.3 - 31.8 ng/mL <0.1 (L)      Latest Reference Range & Units 02/23/24 09:30   Anti-Thyroglobulin 0.0 - 4.0 IU/mL <0.9     Post Surgical Hypothyroidism  Since last visit, she has remained on Levothyroxine 175 micrograms 6 days  for 3-4 years and liothyronine 5 mcg daily, which has been her dose since 2 years.      She is feeling well.    Energy level has been good.      Weight is  decreased 15 lbs in last 2 months .      No palpitations, no frequent diarrhea, or frequent constipation.    Reports heat intolerance  She reports hoarseness to voice at times    Latest Reference Range & Units 02/23/24 09:30   TSH 0.380 - 5.330 uIU/mL 0.820   Free T-4 0.93 - 1.70 ng/dL 1.56     She is currently taking otc Vitamin D 3 5000 IU daily and calcium     Patient's  medications, allergies, and social histories were reviewed and updated as appropriate.      ROS:      CONS:     No fever, no chills   EYES:     No diplopia, no blurry vision   CV:           No chest pain, no palpitations   PULM:     No SOB, no cough, no hemoptysis.   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)  2024-04: Pain, acute postoperative  2024-04: Liver hemangioma  2024-04: Headache  2024-04: SIRS (systemic inflammatory response syndrome) (HCC)  2024-04: Solitary lung nodule  2023-08: Class 2 obesity due to excess calories without serious   comorbidity with body mass index (BMI) of 38.0 to 38.9 in adult  2023-08: Hyperlipidemia  2023-05: Leukocytosis  2023-05: Weakness  2023-05: S/P craniotomy  2023-03: Trigeminal neuralgia of right side of face  2022-10: TMJ arthralgia  2022-10: Gastroesophageal reflux disease without esophagitis  2020-11: Thyroid cancer (HCC)  2014-07: Rib pain on right side  2014-01: Migraines  2014-01: Anxiety and depression  Post-surgical hypothyroidism      Past Surgical History:  Past Surgical History:   Procedure Laterality Date    NE THORACOSCOPY,DX NO BX Right 4/1/2024    Procedure: RIGHT THORACOSCOPIC WEDGE RESECTION;  Surgeon: Kei Taylor M.D.;  Location: SURGERY [facility];  Service: Thoracic    NE TUBE THORACOSTOMY INCLUDES WATER SEAL Right 4/1/2024    Procedure: Chest Tube Placement;  Surgeon: Kei Taylor M.D.;  Location: SURGERY [facility];  Service: Thoracic    CRANIOTOMY Right 05/18/2023    Procedure: RIGHT CRANIOTOMY FOR MICROVASCULAR DECOMPRESSION;  Surgeon: Shruthi Echeverria M.D.;  Location: SURGERY [facility];  Service: Neurosurgery    CHOLECYSTECTOMY  01/01/2009    GYN SURGERY  2016    Endometrial ablation    OTHER  2012    Complete thyroidectomy &  "left neck dissection        Allergies:  Latex and Morphine     Social History:  Social History     Tobacco Use    Smoking status: Former     Current packs/day: 0.00     Types: Cigarettes     Quit date: 1/16/2009     Years since quitting: 15.6    Smokeless tobacco: Never    Tobacco comments:     Social smoker   Vaping Use    Vaping status: Never Used   Substance Use Topics    Alcohol use: Yes     Alcohol/week: 1.2 oz     Types: 1 Glasses of wine, 1 Cans of beer per week     Comment: Occasional drinker, especially since started medications    Drug use: No        Family History:   family history includes Arterial Aneurysm in her father; Breast Cancer in her maternal grandmother; Cancer in her maternal grandmother, maternal uncle, and paternal uncle; Colorectal Cancer in her paternal uncle; Diabetes in her father and paternal grandmother; Heart Disease in her father, maternal grandfather, and paternal grandfather; Hyperlipidemia in her father, maternal grandfather, mother, and paternal grandfather; Hypertension in her maternal grandfather and paternal grandfather; Psychiatric Illness in her father.      PHYSICAL EXAM:   Vital signs: /74 (BP Location: Left arm, Patient Position: Sitting, BP Cuff Size: Adult)   Pulse 90   Ht 1.651 m (5' 5\")   Wt 99.3 kg (219 lb)   SpO2 95%   BMI 36.44 kg/m²   GENERAL: Well-developed, well-nourished in no apparent distress.   EYE:  No ocular asymmetry, PERRLA  HENT: Pink, moist mucous membranes.    NECK: Well healed transverse scar, Thyroid is surgically absent   CARDIOVASCULAR:  No murmurs  LUNGS: Clear breath sounds  ABDOMEN: Soft, nontender   EXTREMITIES: No clubbing, cyanosis, or edema.   NEUROLOGICAL: No gross focal motor abnormalities   LYMPH: No cervical adenopathy palpated.   SKIN: No rashes, lesions.       Labs:  Lab Results   Component Value Date/Time    WBC 10.7 04/09/2024 03:18 AM    RBC 4.47 04/09/2024 03:18 AM    HEMOGLOBIN 13.0 04/09/2024 03:18 AM    MCV 87.9 " "2024 03:18 AM    MCH 29.1 2024 03:18 AM    MCHC 33.1 2024 03:18 AM    RDW 41.8 2024 03:18 AM    MPV 8.5 (L) 2024 03:18 AM       Lab Results   Component Value Date/Time    SODIUM 138 2024 03:18 AM    POTASSIUM 3.9 2024 03:18 AM    CHLORIDE 105 2024 03:18 AM    CO2 23 2024 03:18 AM    ANION 10.0 2024 03:18 AM    GLUCOSE 113 (H) 2024 03:18 AM    BUN 9 2024 03:18 AM    CREATININE 0.63 2024 03:18 AM    CALCIUM 8.6 2024 03:18 AM    ASTSGOT 24 2024 03:18 AM    ALTSGPT 27 2024 03:18 AM    TBILIRUBIN 0.4 2024 03:18 AM    ALBUMIN 3.7 2024 03:18 AM    TOTPROTEIN 6.4 2024 03:18 AM    GLOBULIN 3.8 (H) 2024 09:10 PM    AGRATIO 1.2 2024 09:10 PM       Lab Results   Component Value Date/Time    TSHULTRASEN 0.820 2024 0930     Lab Results   Component Value Date/Time    FREET4 1.56 2024 0930     No results found for: \"FREET3\"  No results found for: \"THYSTIMIG\"      Imagin24  US-THYROID  Order: [id number]  Impression      1.  Stable, subcentimeter lymph nodes bilaterally. No concerning disease  progression or metastatic adenopathy in the interval.    Recommendations are based on the American College of Radiology Thyroid Imaging,  Reporting and Data System (TI-RADS) committee recommendations.    TI-RADS management recommendations:    TR1: Benign (0 pts). No FNA or follow up.  TR2: Not suspicious (2 pts). No FNA or follow up.  TR3: Mildly suspicious (3 pts). FNA if nodule at least 2.5 cm; follow if at  least 1.5 cm.  TR4: Moderately suspicious (4-6 pts). FNA if nodule at least 1.5 cm; follow if  at least 1 cm.  TR5: Highly suspicious (7 or more pts). FNA if greater than 1 cm; follow if at  least 0.5 cm.    ASSESSMENT/PLAN:     1. Post-surgical hypothyroidism  At this time, the patient is clinically stable.   I do not have current blood work at this time.   Medication:  Synthroid 175 mcg 6 days " - continue  Liothyronine 5 mcg daily - continue  She is to take the Synthroid first thing every morning by itself with plain water and wait 30 minutes before eating.  We reviewed the importance of adhering to thyroid hormone replacement therapy.  We reviewed the importance of staying on brand-name thyroid preparation for greater stability in her thyroid hormone levels.  We reviewed symptoms of under and over replacement of thyroid hormone including fatigue, weight changes, heat or cold intolerance, palpitations, tremor.  She should notify me for difficulty with such symptoms.    We will plan followup in 3 months with recheck of lab work for TSH.    In the interval, she will contact me with potential thyroid problems.  - Comp Metabolic Panel; Future  - TSH; Future  - FREE THYROXINE; Future  - T3 FREE; Future  - VITAMIN D,25 HYDROXY (DEFICIENCY); Future  - PTH INTACT (PTH ONLY); Future  - PHOSPHORUS; Future  - SYNTHROID 175 MCG Tab; Take 1 Tablet by mouth every morning on an empty stomach.  Dispense: 90 Tablet; Refill: 3  - liothyronine (CYTOMEL) 5 MCG Tab; Take 1 Tablet by mouth every day.  Dispense: 90 Tablet; Refill: 3    2. History of thyroid cancer  At this time, there is no evidence of recurrence.   She should notify me for new enlargement or lumps in the neck, difficulty swallowing or breathing.    We will plan repeat neck ultrasound in approximately 12 months and repeat thyroglobulin in approximately 3 months.  - THYROGLOBULIN, QT; Future     Return in about 3 months (around 11/26/2024). Patient will have blood work done this week and notify me via [facility] for review    Thank you kindly for allowing me to participate in the thyroid care plan for this patient.    Long Barth, JIMMIE   08/26/24    CC:   TYREL Chisholm

## 2024-11-01 DIAGNOSIS — K21.9 GASTROESOPHAGEAL REFLUX DISEASE WITHOUT ESOPHAGITIS: ICD-10-CM

## 2024-11-01 NOTE — TELEPHONE ENCOUNTER
Received request via: Pharmacy    Was the patient seen in the last year in this department? Yes    Does the patient have an active prescription (recently filled or refills available) for medication(s) requested? No    Pharmacy Name: [facility]     Does the patient have skilled nursing Plus and need 100-day supply? (This applies to ALL medications) Patient does not have SCP

## 2024-11-04 DIAGNOSIS — K21.9 GASTROESOPHAGEAL REFLUX DISEASE WITHOUT ESOPHAGITIS: ICD-10-CM

## 2024-11-04 RX ORDER — PANTOPRAZOLE SODIUM 40 MG/1
40 TABLET, DELAYED RELEASE ORAL DAILY
Qty: 90 TABLET | Refills: 0 | Status: SHIPPED | OUTPATIENT
Start: 2024-11-04

## 2024-11-04 RX ORDER — PANTOPRAZOLE SODIUM 40 MG/1
40 TABLET, DELAYED RELEASE ORAL DAILY
Qty: 90 TABLET | Refills: 0 | Status: SHIPPED | OUTPATIENT
Start: 2024-11-04 | End: 2024-11-04

## 2024-11-04 NOTE — TELEPHONE ENCOUNTER
Received request via: Pharmacy    Was the patient seen in the last year in this department? Yes  LOV : 5/13/2024   Does the patient have an active prescription (recently filled or refills available) for medication(s) requested? Yes.     Pharmacy Name: [facility]    Does the patient have FPC Plus and need 100-day supply? (This applies to ALL medications) Patient does not have SCP

## 2024-11-14 ENCOUNTER — HOSPITAL ENCOUNTER (OUTPATIENT)
Dept: LAB | Facility: MEDICAL CENTER | Age: 49
End: 2024-11-14
Payer: COMMERCIAL

## 2024-11-14 DIAGNOSIS — E89.0 POST-SURGICAL HYPOTHYROIDISM: ICD-10-CM

## 2024-11-14 DIAGNOSIS — Z85.850 HISTORY OF THYROID CANCER: ICD-10-CM

## 2024-11-14 LAB
25(OH)D3 SERPL-MCNC: 25 NG/ML (ref 30–100)
ALBUMIN SERPL BCP-MCNC: 4.5 G/DL (ref 3.2–4.9)
ALBUMIN/GLOB SERPL: 1.6 G/DL
ALP SERPL-CCNC: 56 U/L (ref 30–99)
ALT SERPL-CCNC: 15 U/L (ref 2–50)
ANION GAP SERPL CALC-SCNC: 8 MMOL/L (ref 7–16)
AST SERPL-CCNC: 13 U/L (ref 12–45)
BILIRUB SERPL-MCNC: 0.4 MG/DL (ref 0.1–1.5)
BUN SERPL-MCNC: 16 MG/DL (ref 8–22)
CALCIUM ALBUM COR SERPL-MCNC: 9.2 MG/DL (ref 8.5–10.5)
CALCIUM SERPL-MCNC: 9.6 MG/DL (ref 8.5–10.5)
CHLORIDE SERPL-SCNC: 105 MMOL/L (ref 96–112)
CO2 SERPL-SCNC: 24 MMOL/L (ref 20–33)
CREAT SERPL-MCNC: 0.86 MG/DL (ref 0.5–1.4)
GFR SERPLBLD CREATININE-BSD FMLA CKD-EPI: 82 ML/MIN/1.73 M 2
GLOBULIN SER CALC-MCNC: 2.9 G/DL (ref 1.9–3.5)
GLUCOSE SERPL-MCNC: 101 MG/DL (ref 65–99)
PHOSPHATE SERPL-MCNC: 3.6 MG/DL (ref 2.5–4.5)
POTASSIUM SERPL-SCNC: 4.2 MMOL/L (ref 3.6–5.5)
PROT SERPL-MCNC: 7.4 G/DL (ref 6–8.2)
PTH-INTACT SERPL-MCNC: 69.6 PG/ML (ref 14–72)
SODIUM SERPL-SCNC: 137 MMOL/L (ref 135–145)
T3FREE SERPL-MCNC: 3.04 PG/ML (ref 2–4.4)
T4 FREE SERPL-MCNC: 1.53 NG/DL (ref 0.93–1.7)
TSH SERPL-ACNC: 0.04 UIU/ML (ref 0.35–5.5)

## 2024-11-14 PROCEDURE — 36415 COLL VENOUS BLD VENIPUNCTURE: CPT

## 2024-11-14 PROCEDURE — 83970 ASSAY OF PARATHORMONE: CPT

## 2024-11-14 PROCEDURE — 86800 THYROGLOBULIN ANTIBODY: CPT

## 2024-11-14 PROCEDURE — 84439 ASSAY OF FREE THYROXINE: CPT

## 2024-11-14 PROCEDURE — 84443 ASSAY THYROID STIM HORMONE: CPT

## 2024-11-14 PROCEDURE — 80053 COMPREHEN METABOLIC PANEL: CPT

## 2024-11-14 PROCEDURE — 82306 VITAMIN D 25 HYDROXY: CPT

## 2024-11-14 PROCEDURE — 84481 FREE ASSAY (FT-3): CPT

## 2024-11-14 PROCEDURE — 84432 ASSAY OF THYROGLOBULIN: CPT

## 2024-11-14 PROCEDURE — 84100 ASSAY OF PHOSPHORUS: CPT

## 2024-11-25 ENCOUNTER — OFFICE VISIT (OUTPATIENT)
Dept: ENDOCRINOLOGY | Facility: MEDICAL CENTER | Age: 49
End: 2024-11-25
Payer: COMMERCIAL

## 2024-11-25 VITALS
HEIGHT: 65 IN | DIASTOLIC BLOOD PRESSURE: 64 MMHG | HEART RATE: 82 BPM | BODY MASS INDEX: 35.99 KG/M2 | SYSTOLIC BLOOD PRESSURE: 108 MMHG | WEIGHT: 216 LBS | OXYGEN SATURATION: 97 %

## 2024-11-25 DIAGNOSIS — E66.09 CLASS 2 OBESITY DUE TO EXCESS CALORIES WITHOUT SERIOUS COMORBIDITY WITH BODY MASS INDEX (BMI) OF 35.0 TO 35.9 IN ADULT: ICD-10-CM

## 2024-11-25 DIAGNOSIS — Z85.850 HISTORY OF THYROID CANCER: ICD-10-CM

## 2024-11-25 DIAGNOSIS — E89.0 POST-SURGICAL HYPOTHYROIDISM: ICD-10-CM

## 2024-11-25 DIAGNOSIS — E55.9 VITAMIN D DEFICIENCY: ICD-10-CM

## 2024-11-25 DIAGNOSIS — E66.812 CLASS 2 OBESITY DUE TO EXCESS CALORIES WITHOUT SERIOUS COMORBIDITY WITH BODY MASS INDEX (BMI) OF 35.0 TO 35.9 IN ADULT: ICD-10-CM

## 2024-11-25 PROCEDURE — 3074F SYST BP LT 130 MM HG: CPT

## 2024-11-25 PROCEDURE — 99211 OFF/OP EST MAY X REQ PHY/QHP: CPT

## 2024-11-25 PROCEDURE — 3078F DIAST BP <80 MM HG: CPT

## 2024-11-25 PROCEDURE — 99214 OFFICE O/P EST MOD 30 MIN: CPT

## 2024-11-25 RX ORDER — PHENTERMINE HYDROCHLORIDE 37.5 MG/1
37.5 CAPSULE ORAL EVERY MORNING
Qty: 30 CAPSULE | Refills: 3 | Status: SHIPPED | OUTPATIENT
Start: 2024-11-25 | End: 2024-11-27

## 2024-11-25 ASSESSMENT — FIBROSIS 4 INDEX: FIB4 SCORE: 0.58

## 2024-11-25 NOTE — PROGRESS NOTES
CHIEF COMPLAINT: Followup of postsurgical hypothyroidism and papillary thyroid carcinoma.    HPI:   Ifrah Brunson is a 49 y.o. female, who had initial total thyroidectomy on Oct 2012 by Dr. Harris with pathology revealing classic PTC, 2.7 cm in greatest dimension, 5/28 lymph nodes involved, margins uninvolved, capsular invasion present, no extra thyroidal extension .    She did not have ZAVALETA post thyroidectomy as she was told she a was low risk.     She did have problems with postoperative hypocalcemia due to having one of the Parathyroid glands removed.  Underwent thyroid ultrasound on 07/14/2023. Numerous lymph nodes were noted. She had FNA of 2 suspicious lymph nodes earlier this year, no evidence of malignancy. TG was undetectable in the aspirates.     She has history of lung neuroendocrine carcinoid tumor. She had surgical removal in April 2024. This is followed by oncology.     Neck ultrasound was last done on 2/22/24 with no evidence of recurrence.     She reports taking calcium and antihistamine with synthroid   Denies voice changes  Denies SOB  Denies Difficulty swallowing.   Latest Reference Range & Units 11/14/24 06:45   Thyroglobulin by LC-MC/MS-S/P 1.3 - 31.8 ng/mL Not Applicable   Thyroglobulin 1.3 - 31.8 ng/mL <0.1 (L)      Latest Reference Range & Units 11/14/24 06:45   Anti-Thyroglobulin 0.0 - 4.0 IU/mL <0.9     Post Surgical Hypothyroidism  Since last visit, she has remained on synthroid 175 micrograms 6 days  for 3-4 years and liothyronine 5 mcg daily, which has been her dose since 2 years.      She is feeling well.    Energy level has been good.      Weight is  stable .      No palpitations, no frequent diarrhea, or frequent constipation.    Reports fatigue, heat intolerance     Latest Reference Range & Units 11/14/24 06:45   TSH 0.350 - 5.500 uIU/mL 0.035 (L)      Latest Reference Range & Units 11/14/24 06:45   Free T-4 0.93 - 1.70 ng/dL 1.53      Latest Reference Range &  Units 11/14/24 06:45   T3,Free 2.00 - 4.40 pg/mL 3.04       She is currently taking otc Vitamin D 3 5000 IU daily and calcium    Latest Reference Range & Units 11/14/24 06:45   25-Hydroxy   Vitamin D 25 30 - 100 ng/mL 25 (L)      Latest Reference Range & Units 11/14/24 06:45   Calcium 8.5 - 10.5 mg/dL 9.6   Correct Calcium 8.5 - 10.5 mg/dL 9.2      Latest Reference Range & Units 11/14/24 06:45   Phosphorus 2.5 - 4.5 mg/dL 3.6      Latest Reference Range & Units 11/14/24 06:45   Pth, Intact 14.0 - 72.0 pg/mL 69.6     Patient's medications, allergies, and social histories were reviewed and updated as appropriate.      ROS:      CONS:     No fever, no chills   EYES:     No diplopia, no blurry vision   CV:           No chest pain, no palpitations   PULM:     No SOB, no cough, no hemoptysis.   GI:            No nausea, no vomiting, no diarrhea, no constipation   ENDO:     No polyuria, no polydipsia, no heat intolerance, no cold intolerance       Past Medical History:  Problem List:  2024-11: History of thyroid cancer  2024-11: Vitamin D deficiency  2024-07: Malignant carcinoid tumor of bronchus and lung (HCC)  2024-04: Pain, acute postoperative  2024-04: Liver hemangioma  2024-04: Headache  2024-04: SIRS (systemic inflammatory response syndrome) (HCC)  2024-04: Solitary lung nodule  2023-08: Class 2 obesity due to excess calories without serious   comorbidity with body mass index (BMI) of 38.0 to 38.9 in adult  2023-08: Hyperlipidemia  2023-05: Leukocytosis  2023-05: Weakness  2023-05: S/P craniotomy  2023-03: Trigeminal neuralgia of right side of face  2022-10: TMJ arthralgia  2022-10: Gastroesophageal reflux disease without esophagitis  2020-11: Thyroid cancer (HCC)  2014-07: Rib pain on right side  2014-01: Migraines  2014-01: Anxiety and depression  Post-surgical hypothyroidism      Past Surgical History:  Past Surgical History:   Procedure Laterality Date    WY THORACOSCOPY,DX NO BX Right 4/1/2024    Procedure:  "RIGHT THORACOSCOPIC WEDGE RESECTION;  Surgeon: Kei Taylor M.D.;  Location: SURGERY [facility];  Service: Thoracic    WA TUBE THORACOSTOMY INCLUDES WATER SEAL Right 4/1/2024    Procedure: Chest Tube Placement;  Surgeon: Kei Taylor M.D.;  Location: SURGERY [facility];  Service: Thoracic    CRANIOTOMY Right 05/18/2023    Procedure: RIGHT CRANIOTOMY FOR MICROVASCULAR DECOMPRESSION;  Surgeon: Shruthi Echeverria M.D.;  Location: SURGERY [facility];  Service: Neurosurgery    CHOLECYSTECTOMY  01/01/2009    GYN SURGERY  2016    Endometrial ablation    OTHER  2012    Complete thyroidectomy & left neck dissection        Allergies:  Latex and Morphine     Social History:  Social History     Tobacco Use    Smoking status: Former     Current packs/day: 0.00     Types: Cigarettes     Quit date: 1/16/2009     Years since quitting: 15.8    Smokeless tobacco: Never    Tobacco comments:     Social smoker   Vaping Use    Vaping status: Never Used   Substance Use Topics    Alcohol use: Yes     Alcohol/week: 1.2 oz     Types: 1 Glasses of wine, 1 Cans of beer per week     Comment: Occasional drinker, especially since started medications    Drug use: No        Family History:   family history includes Arterial Aneurysm in her father; Breast Cancer in her maternal grandmother; Cancer in her maternal grandmother, maternal uncle, and paternal uncle; Colorectal Cancer in her paternal uncle; Diabetes in her father and paternal grandmother; Heart Disease in her father, maternal grandfather, and paternal grandfather; Hyperlipidemia in her father, maternal grandfather, mother, and paternal grandfather; Hypertension in her maternal grandfather and paternal grandfather; Psychiatric Illness in her father.      PHYSICAL EXAM:   Vital signs: /64 (BP Location: Left arm, Patient Position: Sitting, BP Cuff Size: Adult)   Pulse 82   Ht 1.651 m (5' 5\")   Wt 98 kg (216 lb)   SpO2 97%   BMI 35.94 kg/m²   GENERAL: Well-developed, " "well-nourished in no apparent distress.   EYE:  No ocular asymmetry, PERRLA  HENT: Pink, moist mucous membranes.    NECK: Well healed transverse scar, Thyroid is surgically absent   CARDIOVASCULAR:  No murmurs  LUNGS: Clear breath sounds  ABDOMEN: Soft, nontender   EXTREMITIES: No clubbing, cyanosis, or edema.   NEUROLOGICAL: No gross focal motor abnormalities   LYMPH: No cervical adenopathy palpated.   SKIN: No rashes, lesions.       Labs:  Lab Results   Component Value Date/Time    WBC 10.7 2024 03:18 AM    RBC 4.47 2024 03:18 AM    HEMOGLOBIN 13.0 2024 03:18 AM    MCV 87.9 2024 03:18 AM    MCH 29.1 2024 03:18 AM    MCHC 33.1 2024 03:18 AM    RDW 41.8 2024 03:18 AM    MPV 8.5 (L) 2024 03:18 AM       Lab Results   Component Value Date/Time    SODIUM 137 2024 06:45 AM    POTASSIUM 4.2 2024 06:45 AM    CHLORIDE 105 2024 06:45 AM    CO2 24 2024 06:45 AM    ANION 8.0 2024 06:45 AM    GLUCOSE 101 (H) 2024 06:45 AM    BUN 16 2024 06:45 AM    CREATININE 0.86 2024 06:45 AM    CALCIUM 9.6 2024 06:45 AM    ASTSGOT 13 2024 06:45 AM    ALTSGPT 15 2024 06:45 AM    TBILIRUBIN 0.4 2024 06:45 AM    ALBUMIN 4.5 2024 06:45 AM    TOTPROTEIN 7.4 2024 06:45 AM    GLOBULIN 2.9 2024 06:45 AM    AGRATIO 1.6 2024 06:45 AM       Lab Results   Component Value Date/Time    TSHULTRASEN 0.820 2024 0930     Lab Results   Component Value Date/Time    FREET4 1.56 2024 0930     No results found for: \"FREET3\"  No results found for: \"THYSTIMIG\"      Imagin24  US-THYROID  Order: [id number]  Impression      1.  Stable, subcentimeter lymph nodes bilaterally. No concerning disease  progression or metastatic adenopathy in the interval.    Recommendations are based on the American College of [employer] Thyroid Imaging,  Reporting and Data System (TI-RADS) committee recommendations.    TI-RADS " management recommendations:    TR1: Benign (0 pts). No FNA or follow up.  TR2: Not suspicious (2 pts). No FNA or follow up.  TR3: Mildly suspicious (3 pts). FNA if nodule at least 2.5 cm; follow if at  least 1.5 cm.  TR4: Moderately suspicious (4-6 pts). FNA if nodule at least 1.5 cm; follow if  at least 1 cm.  TR5: Highly suspicious (7 or more pts). FNA if greater than 1 cm; follow if at  least 0.5 cm.    ASSESSMENT/PLAN:   1. Post-surgical hypothyroidism  Unstable  TSH 0.035  Medication:  Synthroid 175 mcg 6 days - change to synthroid 175 mcg 5 days 1/2 tab one day and no tab 7th day  Liothyronine 5 mcg daily - continue  She is to take the Synthroid first thing every morning by itself with plain water and wait 30 minutes before eating.  We reviewed the importance of adhering to thyroid hormone replacement therapy.  We reviewed the importance of staying on brand-name thyroid preparation for greater stability in her thyroid hormone levels.  We reviewed symptoms of under and over replacement of thyroid hormone including fatigue, weight changes, heat or cold intolerance, palpitations, tremor.  She should notify me for difficulty with such symptoms.    We will plan followup in 4 months with recheck of lab work for TSH.    In the interval, she will contact me with potential thyroid problems.  - TSH; Future  - FREE THYROXINE; Future  - T3 FREE; Future    2. History of thyroid cancer  At this time, there is no evidence of recurrence.   She should notify me for new enlargement or lumps in the neck, difficulty swallowing or breathing.    We will plan repeat neck ultrasound in approximately 12 months and repeat thyroglobulin in approximately 6 months.  - THYROGLOBULIN, QT; Future    3. Class 2 obesity due to excess calories without serious comorbidity with body mass index (BMI) of 35.0 to 35.9 in adult  Unstable  Medication:  Phentermine 37.5 mg   - phentermine 37.5 MG capsule; Take 1 Capsule by mouth every morning for 30  days.  Dispense: 30 Capsule; Refill: 3    4. Vitamin D deficiency  Unstable  Continue current regimen- see HPI  - Comp Metabolic Panel; Future  - VITAMIN D,25 HYDROXY (DEFICIENCY); Future     Return in about 4 months (around 3/25/2025). Patient will have blood work done in 6 weeks and notify me via [employer] for review    Thank you kindly for allowing me to participate in the thyroid care plan for this patient.    Long Barth, JIMMIE   11/25/24    CC:   TYREL Chisholm

## 2024-11-27 DIAGNOSIS — E66.812 CLASS 2 OBESITY DUE TO EXCESS CALORIES WITHOUT SERIOUS COMORBIDITY WITH BODY MASS INDEX (BMI) OF 35.0 TO 35.9 IN ADULT: ICD-10-CM

## 2024-11-27 DIAGNOSIS — E66.09 CLASS 2 OBESITY DUE TO EXCESS CALORIES WITHOUT SERIOUS COMORBIDITY WITH BODY MASS INDEX (BMI) OF 35.0 TO 35.9 IN ADULT: ICD-10-CM

## 2024-11-27 RX ORDER — PHENTERMINE HYDROCHLORIDE 37.5 MG/1
37.5 CAPSULE ORAL EVERY MORNING
Qty: 30 CAPSULE | Refills: 3 | Status: SHIPPED | OUTPATIENT
Start: 2024-11-27 | End: 2024-12-27

## 2024-12-26 DIAGNOSIS — R05.3 CHRONIC COUGH: ICD-10-CM

## 2024-12-26 RX ORDER — MONTELUKAST SODIUM 10 MG/1
10 TABLET ORAL DAILY
Qty: 90 TABLET | Refills: 3 | Status: ON HOLD | OUTPATIENT
Start: 2024-12-26 | End: 2024-12-29

## 2024-12-26 NOTE — TELEPHONE ENCOUNTER
Received request via: Pharmacy    Was the patient seen in the last year in this department? Yes    Does the patient have an active prescription (recently filled or refills available) for medication(s) requested? No    Pharmacy Name: "[facility]     Does the patient have [facility] and need 100-day supply? (This applies to ALL medications) Patient does not have SCP

## 2024-12-28 DIAGNOSIS — F32.A ANXIETY AND DEPRESSION: ICD-10-CM

## 2024-12-28 DIAGNOSIS — F41.9 ANXIETY AND DEPRESSION: ICD-10-CM

## 2024-12-29 ENCOUNTER — APPOINTMENT (OUTPATIENT)
Dept: CARDIOLOGY | Facility: MEDICAL CENTER | Age: 49
End: 2024-12-29
Attending: HOSPITALIST
Payer: COMMERCIAL

## 2024-12-29 ENCOUNTER — HOSPITAL ENCOUNTER (OUTPATIENT)
Facility: MEDICAL CENTER | Age: 49
End: 2024-12-29
Attending: EMERGENCY MEDICINE | Admitting: HOSPITALIST
Payer: COMMERCIAL

## 2024-12-29 ENCOUNTER — APPOINTMENT (OUTPATIENT)
Dept: RADIOLOGY | Facility: MEDICAL CENTER | Age: 49
End: 2024-12-29
Attending: HOSPITALIST
Payer: COMMERCIAL

## 2024-12-29 ENCOUNTER — APPOINTMENT (OUTPATIENT)
Dept: RADIOLOGY | Facility: MEDICAL CENTER | Age: 49
End: 2024-12-29
Attending: EMERGENCY MEDICINE
Payer: COMMERCIAL

## 2024-12-29 VITALS
TEMPERATURE: 98.2 F | HEIGHT: 65 IN | BODY MASS INDEX: 32.69 KG/M2 | OXYGEN SATURATION: 98 % | DIASTOLIC BLOOD PRESSURE: 79 MMHG | HEART RATE: 100 BPM | RESPIRATION RATE: 18 BRPM | SYSTOLIC BLOOD PRESSURE: 134 MMHG | WEIGHT: 196.21 LBS

## 2024-12-29 DIAGNOSIS — R79.89 LOW SERUM THYROID STIMULATING HORMONE (TSH): ICD-10-CM

## 2024-12-29 DIAGNOSIS — R05.3 CHRONIC COUGH: ICD-10-CM

## 2024-12-29 DIAGNOSIS — E89.0 POST-SURGICAL HYPOTHYROIDISM: ICD-10-CM

## 2024-12-29 PROBLEM — R07.9 CHEST PAIN: Status: ACTIVE | Noted: 2024-12-29

## 2024-12-29 LAB
ALBUMIN SERPL BCP-MCNC: 4.6 G/DL (ref 3.2–4.9)
ALBUMIN/GLOB SERPL: 1.6 G/DL
ALP SERPL-CCNC: 58 U/L (ref 30–99)
ALT SERPL-CCNC: 12 U/L (ref 2–50)
ANION GAP SERPL CALC-SCNC: 15 MMOL/L (ref 7–16)
AST SERPL-CCNC: 9 U/L (ref 12–45)
BASOPHILS # BLD AUTO: 0.4 % (ref 0–1.8)
BASOPHILS # BLD: 0.04 K/UL (ref 0–0.12)
BILIRUB SERPL-MCNC: 0.4 MG/DL (ref 0.1–1.5)
BUN SERPL-MCNC: 21 MG/DL (ref 8–22)
CALCIUM ALBUM COR SERPL-MCNC: 9.6 MG/DL (ref 8.5–10.5)
CALCIUM SERPL-MCNC: 10.1 MG/DL (ref 8.5–10.5)
CHLORIDE SERPL-SCNC: 101 MMOL/L (ref 96–112)
CO2 SERPL-SCNC: 21 MMOL/L (ref 20–33)
CREAT SERPL-MCNC: 0.9 MG/DL (ref 0.5–1.4)
CRP SERPL HS-MCNC: 7.4 MG/L (ref 0–3)
D DIMER PPP IA.FEU-MCNC: <0.27 UG/ML (FEU) (ref 0–0.5)
EKG IMPRESSION: NORMAL
EOSINOPHIL # BLD AUTO: 0.11 K/UL (ref 0–0.51)
EOSINOPHIL NFR BLD: 1.1 % (ref 0–6.9)
ERYTHROCYTE [DISTWIDTH] IN BLOOD BY AUTOMATED COUNT: 38.8 FL (ref 35.9–50)
ERYTHROCYTE [SEDIMENTATION RATE] IN BLOOD BY WESTERGREN METHOD: 5 MM/HOUR (ref 0–25)
GFR SERPLBLD CREATININE-BSD FMLA CKD-EPI: 78 ML/MIN/1.73 M 2
GLOBULIN SER CALC-MCNC: 2.9 G/DL (ref 1.9–3.5)
GLUCOSE SERPL-MCNC: 138 MG/DL (ref 65–99)
HCG SERPL QL: NEGATIVE
HCT VFR BLD AUTO: 44.6 % (ref 37–47)
HGB BLD-MCNC: 15.4 G/DL (ref 12–16)
IMM GRANULOCYTES # BLD AUTO: 0.04 K/UL (ref 0–0.11)
IMM GRANULOCYTES NFR BLD AUTO: 0.4 % (ref 0–0.9)
LIPASE SERPL-CCNC: 37 U/L (ref 11–82)
LV EJECT FRACT  99904: 60
LV EJECT FRACT MOD 2C 99903: 59.54
LV EJECT FRACT MOD 4C 99902: 65.28
LV EJECT FRACT MOD BP 99901: 63.75
LYMPHOCYTES # BLD AUTO: 2.33 K/UL (ref 1–4.8)
LYMPHOCYTES NFR BLD: 22.7 % (ref 22–41)
MCH RBC QN AUTO: 29.1 PG (ref 27–33)
MCHC RBC AUTO-ENTMCNC: 34.5 G/DL (ref 32.2–35.5)
MCV RBC AUTO: 84.2 FL (ref 81.4–97.8)
MONOCYTES # BLD AUTO: 0.76 K/UL (ref 0–0.85)
MONOCYTES NFR BLD AUTO: 7.4 % (ref 0–13.4)
NEUTROPHILS # BLD AUTO: 6.99 K/UL (ref 1.82–7.42)
NEUTROPHILS NFR BLD: 68 % (ref 44–72)
NRBC # BLD AUTO: 0 K/UL
NRBC BLD-RTO: 0 /100 WBC (ref 0–0.2)
NT-PROBNP SERPL IA-MCNC: <36 PG/ML (ref 0–125)
PLATELET # BLD AUTO: 267 K/UL (ref 164–446)
PMV BLD AUTO: 8.9 FL (ref 9–12.9)
POTASSIUM SERPL-SCNC: 3.9 MMOL/L (ref 3.6–5.5)
PROT SERPL-MCNC: 7.5 G/DL (ref 6–8.2)
RBC # BLD AUTO: 5.3 M/UL (ref 4.2–5.4)
SODIUM SERPL-SCNC: 137 MMOL/L (ref 135–145)
TROPONIN T SERPL-MCNC: 7 NG/L (ref 6–19)
TROPONIN T SERPL-MCNC: <6 NG/L (ref 6–19)
TSH SERPL-ACNC: 0.03 UIU/ML (ref 0.35–5.5)
WBC # BLD AUTO: 10.3 K/UL (ref 4.8–10.8)

## 2024-12-29 PROCEDURE — A9270 NON-COVERED ITEM OR SERVICE: HCPCS | Performed by: HOSPITALIST

## 2024-12-29 PROCEDURE — 93005 ELECTROCARDIOGRAM TRACING: CPT | Mod: TC | Performed by: EMERGENCY MEDICINE

## 2024-12-29 PROCEDURE — 96372 THER/PROPH/DIAG INJ SC/IM: CPT

## 2024-12-29 PROCEDURE — 700102 HCHG RX REV CODE 250 W/ 637 OVERRIDE(OP): Performed by: HOSPITALIST

## 2024-12-29 PROCEDURE — 93306 TTE W/DOPPLER COMPLETE: CPT

## 2024-12-29 PROCEDURE — A9270 NON-COVERED ITEM OR SERVICE: HCPCS | Performed by: EMERGENCY MEDICINE

## 2024-12-29 PROCEDURE — 71045 X-RAY EXAM CHEST 1 VIEW: CPT

## 2024-12-29 PROCEDURE — 99236 HOSP IP/OBS SAME DATE HI 85: CPT | Performed by: HOSPITALIST

## 2024-12-29 PROCEDURE — G0378 HOSPITAL OBSERVATION PER HR: HCPCS

## 2024-12-29 PROCEDURE — 93005 ELECTROCARDIOGRAM TRACING: CPT | Mod: TC

## 2024-12-29 PROCEDURE — 99285 EMERGENCY DEPT VISIT HI MDM: CPT

## 2024-12-29 PROCEDURE — 85652 RBC SED RATE AUTOMATED: CPT

## 2024-12-29 PROCEDURE — 86141 C-REACTIVE PROTEIN HS: CPT

## 2024-12-29 PROCEDURE — 84443 ASSAY THYROID STIM HORMONE: CPT

## 2024-12-29 PROCEDURE — 96376 TX/PRO/DX INJ SAME DRUG ADON: CPT

## 2024-12-29 PROCEDURE — 80053 COMPREHEN METABOLIC PANEL: CPT

## 2024-12-29 PROCEDURE — 83690 ASSAY OF LIPASE: CPT

## 2024-12-29 PROCEDURE — 85025 COMPLETE CBC W/AUTO DIFF WBC: CPT

## 2024-12-29 PROCEDURE — A9502 TC99M TETROFOSMIN: HCPCS

## 2024-12-29 PROCEDURE — 84703 CHORIONIC GONADOTROPIN ASSAY: CPT

## 2024-12-29 PROCEDURE — 83880 ASSAY OF NATRIURETIC PEPTIDE: CPT

## 2024-12-29 PROCEDURE — 96374 THER/PROPH/DIAG INJ IV PUSH: CPT

## 2024-12-29 PROCEDURE — 93306 TTE W/DOPPLER COMPLETE: CPT | Mod: 26 | Performed by: INTERNAL MEDICINE

## 2024-12-29 PROCEDURE — 84484 ASSAY OF TROPONIN QUANT: CPT

## 2024-12-29 PROCEDURE — 85379 FIBRIN DEGRADATION QUANT: CPT

## 2024-12-29 PROCEDURE — 36415 COLL VENOUS BLD VENIPUNCTURE: CPT

## 2024-12-29 PROCEDURE — 700102 HCHG RX REV CODE 250 W/ 637 OVERRIDE(OP): Performed by: EMERGENCY MEDICINE

## 2024-12-29 PROCEDURE — 700111 HCHG RX REV CODE 636 W/ 250 OVERRIDE (IP): Performed by: HOSPITALIST

## 2024-12-29 RX ORDER — LEVOTHYROXINE SODIUM 175 UG/1
175 TABLET ORAL
Status: DISCONTINUED | OUTPATIENT
Start: 2024-12-30 | End: 2024-12-29

## 2024-12-29 RX ORDER — LEVOTHYROXINE SODIUM 150 UG/1
150 TABLET ORAL
Status: DISCONTINUED | OUTPATIENT
Start: 2024-12-30 | End: 2024-12-29

## 2024-12-29 RX ORDER — ONDANSETRON 2 MG/ML
4 INJECTION INTRAMUSCULAR; INTRAVENOUS EVERY 4 HOURS PRN
Status: DISCONTINUED | OUTPATIENT
Start: 2024-12-29 | End: 2024-12-29 | Stop reason: HOSPADM

## 2024-12-29 RX ORDER — REGADENOSON 0.08 MG/ML
INJECTION, SOLUTION INTRAVENOUS
Status: DISCONTINUED
Start: 2024-12-29 | End: 2024-12-29 | Stop reason: HOSPADM

## 2024-12-29 RX ORDER — ENOXAPARIN SODIUM 100 MG/ML
40 INJECTION SUBCUTANEOUS DAILY
Status: DISCONTINUED | OUTPATIENT
Start: 2024-12-29 | End: 2024-12-29 | Stop reason: HOSPADM

## 2024-12-29 RX ORDER — LEVOTHYROXINE SODIUM 175 UG/1
87.5 TABLET ORAL
Status: DISCONTINUED | OUTPATIENT
Start: 2025-01-04 | End: 2024-12-29

## 2024-12-29 RX ORDER — PROCHLORPERAZINE EDISYLATE 5 MG/ML
5-10 INJECTION INTRAMUSCULAR; INTRAVENOUS EVERY 4 HOURS PRN
Status: DISCONTINUED | OUTPATIENT
Start: 2024-12-29 | End: 2024-12-29 | Stop reason: HOSPADM

## 2024-12-29 RX ORDER — MONTELUKAST SODIUM 10 MG/1
10 TABLET ORAL DAILY
Qty: 30 TABLET | Refills: 1 | Status: SHIPPED | OUTPATIENT
Start: 2024-12-29

## 2024-12-29 RX ORDER — KETOROLAC TROMETHAMINE 15 MG/ML
15 INJECTION, SOLUTION INTRAMUSCULAR; INTRAVENOUS ONCE
Status: COMPLETED | OUTPATIENT
Start: 2024-12-29 | End: 2024-12-29

## 2024-12-29 RX ORDER — TRAZODONE HYDROCHLORIDE 50 MG/1
50 TABLET, FILM COATED ORAL NIGHTLY PRN
Status: DISCONTINUED | OUTPATIENT
Start: 2024-12-29 | End: 2024-12-29 | Stop reason: HOSPADM

## 2024-12-29 RX ORDER — LEVOTHYROXINE SODIUM 125 UG/1
125 TABLET ORAL
Qty: 90 TABLET | Refills: 1 | Status: SHIPPED | OUTPATIENT
Start: 2024-12-29

## 2024-12-29 RX ORDER — PHENTERMINE HYDROCHLORIDE 37.5 MG/1
37.5 CAPSULE ORAL EVERY MORNING
COMMUNITY

## 2024-12-29 RX ORDER — PROMETHAZINE HYDROCHLORIDE 25 MG/1
12.5-25 TABLET ORAL EVERY 4 HOURS PRN
Status: DISCONTINUED | OUTPATIENT
Start: 2024-12-29 | End: 2024-12-29 | Stop reason: HOSPADM

## 2024-12-29 RX ORDER — AMINOPHYLLINE 25 MG/ML
100 INJECTION, SOLUTION INTRAVENOUS
Status: DISCONTINUED | OUTPATIENT
Start: 2024-12-29 | End: 2024-12-29 | Stop reason: HOSPADM

## 2024-12-29 RX ORDER — LEVOTHYROXINE SODIUM 125 UG/1
125 TABLET ORAL
Status: DISCONTINUED | OUTPATIENT
Start: 2024-12-30 | End: 2024-12-29 | Stop reason: HOSPADM

## 2024-12-29 RX ORDER — LIOTHYRONINE SODIUM 5 UG/1
5 TABLET ORAL DAILY
Status: DISCONTINUED | OUTPATIENT
Start: 2024-12-29 | End: 2024-12-29 | Stop reason: HOSPADM

## 2024-12-29 RX ORDER — ASPIRIN 81 MG/1
324 TABLET, CHEWABLE ORAL ONCE
Status: COMPLETED | OUTPATIENT
Start: 2024-12-29 | End: 2024-12-29

## 2024-12-29 RX ORDER — LEVOTHYROXINE SODIUM 150 UG/1
150 TABLET ORAL
Status: DISCONTINUED | OUTPATIENT
Start: 2024-12-29 | End: 2024-12-29

## 2024-12-29 RX ORDER — ONDANSETRON 4 MG/1
4 TABLET, ORALLY DISINTEGRATING ORAL EVERY 4 HOURS PRN
Status: DISCONTINUED | OUTPATIENT
Start: 2024-12-29 | End: 2024-12-29 | Stop reason: HOSPADM

## 2024-12-29 RX ORDER — PROMETHAZINE HYDROCHLORIDE 12.5 MG/1
12.5-25 SUPPOSITORY RECTAL EVERY 4 HOURS PRN
Status: DISCONTINUED | OUTPATIENT
Start: 2024-12-29 | End: 2024-12-29 | Stop reason: HOSPADM

## 2024-12-29 RX ORDER — BUPROPION HYDROCHLORIDE 150 MG/1
150 TABLET, EXTENDED RELEASE ORAL 2 TIMES DAILY
Status: DISCONTINUED | OUTPATIENT
Start: 2024-12-29 | End: 2024-12-29 | Stop reason: HOSPADM

## 2024-12-29 RX ORDER — REGADENOSON 0.08 MG/ML
0.4 INJECTION, SOLUTION INTRAVENOUS ONCE
Status: COMPLETED | OUTPATIENT
Start: 2024-12-29 | End: 2024-12-29

## 2024-12-29 RX ADMIN — ASPIRIN 324 MG: 81 TABLET, CHEWABLE ORAL at 07:01

## 2024-12-29 RX ADMIN — OMEPRAZOLE 20 MG: 20 CAPSULE, DELAYED RELEASE ORAL at 11:27

## 2024-12-29 RX ADMIN — OMEPRAZOLE 20 MG: 20 CAPSULE, DELAYED RELEASE ORAL at 17:12

## 2024-12-29 RX ADMIN — BUPROPION HYDROCHLORIDE 150 MG: 150 TABLET, FILM COATED, EXTENDED RELEASE ORAL at 10:19

## 2024-12-29 RX ADMIN — LIDOCAINE HYDROCHLORIDE 30 ML: 20 SOLUTION ORAL; TOPICAL at 08:04

## 2024-12-29 RX ADMIN — LIOTHYRONINE SODIUM 5 MCG: 5 TABLET ORAL at 11:29

## 2024-12-29 RX ADMIN — KETOROLAC TROMETHAMINE 15 MG: 15 INJECTION, SOLUTION INTRAMUSCULAR; INTRAVENOUS at 10:19

## 2024-12-29 RX ADMIN — BUPROPION HYDROCHLORIDE 150 MG: 150 TABLET, FILM COATED, EXTENDED RELEASE ORAL at 17:12

## 2024-12-29 RX ADMIN — ENOXAPARIN SODIUM 40 MG: 100 INJECTION SUBCUTANEOUS at 17:11

## 2024-12-29 RX ADMIN — KETOROLAC TROMETHAMINE 15 MG: 15 INJECTION, SOLUTION INTRAMUSCULAR; INTRAVENOUS at 17:41

## 2024-12-29 RX ADMIN — REGADENOSON 0.4 MG: 0.08 INJECTION, SOLUTION INTRAVENOUS at 14:15

## 2024-12-29 SDOH — ECONOMIC STABILITY: TRANSPORTATION INSECURITY
IN THE PAST 12 MONTHS, HAS THE LACK OF TRANSPORTATION KEPT YOU FROM MEDICAL APPOINTMENTS OR FROM GETTING MEDICATIONS?: NO

## 2024-12-29 SDOH — ECONOMIC STABILITY: TRANSPORTATION INSECURITY
IN THE PAST 12 MONTHS, HAS LACK OF RELIABLE TRANSPORTATION KEPT YOU FROM MEDICAL APPOINTMENTS, MEETINGS, WORK OR FROM GETTING THINGS NEEDED FOR DAILY LIVING?: NO

## 2024-12-29 ASSESSMENT — PATIENT HEALTH QUESTIONNAIRE - PHQ9
SUM OF ALL RESPONSES TO PHQ QUESTIONS 1-9: 10
1. LITTLE INTEREST OR PLEASURE IN DOING THINGS: MORE THAN HALF THE DAYS
7. TROUBLE CONCENTRATING ON THINGS, SUCH AS READING THE NEWSPAPER OR WATCHING TELEVISION: NOT AT ALL
5. POOR APPETITE OR OVEREATING: SEVERAL DAYS
8. MOVING OR SPEAKING SO SLOWLY THAT OTHER PEOPLE COULD HAVE NOTICED. OR THE OPPOSITE, BEING SO FIGETY OR RESTLESS THAT YOU HAVE BEEN MOVING AROUND A LOT MORE THAN USUAL: NOT AT ALL
4. FEELING TIRED OR HAVING LITTLE ENERGY: MORE THAN HALF THE DAYS
2. FEELING DOWN, DEPRESSED, IRRITABLE, OR HOPELESS: MORE THAN HALF THE DAYS
9. THOUGHTS THAT YOU WOULD BE BETTER OFF DEAD, OR OF HURTING YOURSELF: NOT AT ALL
6. FEELING BAD ABOUT YOURSELF - OR THAT YOU ARE A FAILURE OR HAVE LET YOURSELF OR YOUR FAMILY DOWN: SEVERAL DAYS
SUM OF ALL RESPONSES TO PHQ9 QUESTIONS 1 AND 2: 4
3. TROUBLE FALLING OR STAYING ASLEEP OR SLEEPING TOO MUCH: MORE THAN HALF THE DAYS

## 2024-12-29 ASSESSMENT — COGNITIVE AND FUNCTIONAL STATUS - GENERAL
SUGGESTED CMS G CODE MODIFIER MOBILITY: CH
SUGGESTED CMS G CODE MODIFIER DAILY ACTIVITY: CH
DAILY ACTIVITIY SCORE: 24
MOBILITY SCORE: 24

## 2024-12-29 ASSESSMENT — HEART SCORE
HEART SCORE: 4
RISK FACTORS: 1-2 RISK FACTORS
ECG: NORMAL
TROPONIN: LESS THAN OR EQUAL TO NORMAL LIMIT
AGE: 45-64
HISTORY: HIGHLY SUSPICIOUS

## 2024-12-29 ASSESSMENT — LIFESTYLE VARIABLES
AVERAGE NUMBER OF DAYS PER WEEK YOU HAVE A DRINK CONTAINING ALCOHOL: 0
HAVE PEOPLE ANNOYED YOU BY CRITICIZING YOUR DRINKING: NO
HOW MANY TIMES IN THE PAST YEAR HAVE YOU HAD 5 OR MORE DRINKS IN A DAY: 0
ALCOHOL_USE: YES
CONSUMPTION TOTAL: NEGATIVE
TOTAL SCORE: 0
HAVE YOU EVER FELT YOU SHOULD CUT DOWN ON YOUR DRINKING: NO
EVER HAD A DRINK FIRST THING IN THE MORNING TO STEADY YOUR NERVES TO GET RID OF A HANGOVER: NO
TOTAL SCORE: 0
EVER FELT BAD OR GUILTY ABOUT YOUR DRINKING: NO
ON A TYPICAL DAY WHEN YOU DRINK ALCOHOL HOW MANY DRINKS DO YOU HAVE: 0
TOTAL SCORE: 0
DOES PATIENT WANT TO STOP DRINKING: NO

## 2024-12-29 ASSESSMENT — ENCOUNTER SYMPTOMS
NEUROLOGICAL NEGATIVE: 1
GASTROINTESTINAL NEGATIVE: 1
CONSTITUTIONAL NEGATIVE: 1
SHORTNESS OF BREATH: 1
PSYCHIATRIC NEGATIVE: 1
MUSCULOSKELETAL NEGATIVE: 1

## 2024-12-29 ASSESSMENT — SOCIAL DETERMINANTS OF HEALTH (SDOH)
IN THE PAST 12 MONTHS, HAS THE ELECTRIC, GAS, OIL, OR WATER COMPANY THREATENED TO SHUT OFF SERVICE IN YOUR HOME?: NO
WITHIN THE LAST YEAR, HAVE YOU BEEN HUMILIATED OR EMOTIONALLY ABUSED IN OTHER WAYS BY YOUR PARTNER OR EX-PARTNER?: NO
WITHIN THE LAST YEAR, HAVE YOU BEEN KICKED, HIT, SLAPPED, OR OTHERWISE PHYSICALLY HURT BY YOUR PARTNER OR EX-PARTNER?: NO
WITHIN THE PAST 12 MONTHS, THE FOOD YOU BOUGHT JUST DIDN'T LAST AND YOU DIDN'T HAVE MONEY TO GET MORE: NEVER TRUE
WITHIN THE LAST YEAR, HAVE YOU BEEN AFRAID OF YOUR PARTNER OR EX-PARTNER?: NO
WITHIN THE PAST 12 MONTHS, YOU WORRIED THAT YOUR FOOD WOULD RUN OUT BEFORE YOU GOT THE MONEY TO BUY MORE: NEVER TRUE
WITHIN THE LAST YEAR, HAVE TO BEEN RAPED OR FORCED TO HAVE ANY KIND OF SEXUAL ACTIVITY BY YOUR PARTNER OR EX-PARTNER?: NO

## 2024-12-29 ASSESSMENT — PAIN DESCRIPTION - PAIN TYPE
TYPE: ACUTE PAIN

## 2024-12-29 ASSESSMENT — FIBROSIS 4 INDEX
FIB4 SCORE: 0.48
FIB4 SCORE: 0.58
FIB4 SCORE: 0.48
FIB4 SCORE: 0.48

## 2024-12-29 NOTE — PROGRESS NOTES
4 Eyes Skin Assessment Completed by Jennifer, SILVER and SILVER Patel.    Head WDL  Ears WDL  Nose WDL  Mouth WDL  Neck WDL  Breast/Chest WDL  Shoulder Blades WDL  Spine WDL  (R) Arm/Elbow/Hand WDL  (L) Arm/Elbow/Hand WDL  Abdomen WDL  Groin WDL  Scrotum/Coccyx/Buttocks WDL  (R) Leg WDL  (L) Leg WDL  (R) Heel/Foot/Toe WDL  (L) Heel/Foot/Toe WDL          Devices In Places Tele Box and Pulse Ox      Interventions In Place Pillows    Possible Skin Injury No    Pictures Uploaded Into Epic N/A  Wound Consult Placed N/A  RN Wound Prevention Protocol Ordered No

## 2024-12-29 NOTE — ED NOTES
Medication history reviewed with PT at bedside    Med rec is complete per PT reporting    Allergies reviewed.     Patient denies any outpatient antibiotics in the last 30 days.     Patient is not taking anticoagulants.    Preferred pharmacy for this visit - [facility] ([phone])

## 2024-12-29 NOTE — ASSESSMENT & PLAN NOTE
Due to current dosage of patient's levothyroxine.    Patient agreeable  to cut back to 125 mcg daily

## 2024-12-29 NOTE — ASSESSMENT & PLAN NOTE
The 10-year ASCVD risk score (Sara SÁNCHEZ, et al., 2019) is: 1.2%    Values used to calculate the score:      Age: 49 years      Sex: Female      Is Non- : No      Diabetic: No      Tobacco smoker: No      Systolic Blood Pressure: 123 mmHg      Is BP treated: No      HDL Cholesterol: 50 mg/dL      Total Cholesterol: 200 mg/dL      Here to rule out for possible ACS.    Recommend getting ESR CRP to assess for inflammatory markers patient may have pericarditis.  Will check an echocardiogram.  Check serial troponins.  If troponins negative patient go for a Persantine thallium stress test when available

## 2024-12-29 NOTE — ED TRIAGE NOTES
Chief Complaint   Patient presents with    Chest Pain     x3 hours; pain across both sides of upper chest and radiates to left shoulder    Shortness of Breath     x3 hours    Epigastric Pain     Started after pt finished eating dinner around 1900; +nausea but pt denies vomiting or diarrhea     Pt to triage via wheelchair with  for above complaint. EKG done prior to triage and protocols ordered for chest pain/abdominal pain.     Pt is alert/oriented and follows commands. Pt speaking in full sentences and responds appropriately to questions. No acute distress noted in triage and respirations are even and unlabored.     Pt placed in lobby and educated on triage process. Pt and  encouraged to alert staff for any changes in condition.

## 2024-12-29 NOTE — ED NOTES
Bedside report received from off going RN/tech: April, assumed care of patient.  POC discussed with patient. Call light within reach, all needs addressed at this time.       Fall risk interventions in place: Patient's personal possessions are with in their safe reach, Place socks on patient, and Keep floor surfaces clean and dry (all applicable per [place] Fall risk assessment)   Continuous monitoring: Cardiac Leads, Pulse Ox, or Blood Pressure  IVF/IV medications: Not Applicable   Oxygen: Room Air  Bedside sitter: Not Applicable   Isolation: Not Applicable

## 2024-12-29 NOTE — CARE PLAN
The patient is Stable - Low risk of patient condition declining or worsening    Shift Goals  Clinical Goals: Nu/-med scan, echo, comfort, rest  Patient Goals: comfort, rest, answer questions  Family Goals: support patient    Progress made toward(s) clinical / shift goals:    Problem: Pain - Standard  Goal: Alleviation of pain or a reduction in pain to the patient’s comfort goal  Description: Target End Date:  Prior to discharge or change in level of care    Document on Vitals flowsheet    1.  Document pain using the appropriate pain scale per order or unit policy  2.  Educate and implement non-pharmacologic comfort measures (i.e. relaxation, distraction, massage, cold/heat therapy, etc.)  3.  Pain management medications as ordered  4.  Reassess pain after pain med administration per policy  5.  If opiods administered assess patient's response to pain medication is appropriate per POSS sedation scale  6.  Follow pain management plan developed in collaboration with patient and interdisciplinary team (including palliative care or pain specialists if applicable)  Outcome: Progressing  Note: Assess pain per unit standard and as needed  Provide comfort measures as appropriate; see flowsheet  Administer medications as ordered       Problem: Knowledge Deficit - Standard  Goal: Patient and family/care givers will demonstrate understanding of plan of care, disease process/condition, diagnostic tests and medications  Description: Target End Date:  1-3 days or as soon as patient condition allows    Document in Patient Education    1.  Patient and family/caregiver oriented to unit, equipment, visitation policy and means for communicating concern  2.  Complete/review Learning Assessment  3.  Assess knowledge level of disease process/condition, treatment plan, diagnostic tests and medications  4.  Explain disease process/condition, treatment plan, diagnostic tests and medications  Outcome: Progressing  Note: Discuss POC with  patient  Address questions and concerns, escalate as appropriate  Check for pt understanding of POC       Problem: Depression  Goal: Patient and family/caregiver will verbalize accurate information about at least two of the possible causes of depression, three-four of the signs and symptoms of depression  Description: Target End Date:  1 to 3 days    1.  Assess the patient's and family/caregiver's knowledge regarding depression and its causes.  2.  Explain to the patient and family/caregiver regarding the major symptoms of depression.  3.  Inform the patient and family/caregiver that depression can be treated through medications and psychotherapy.  4.  Allow the patient to express feelings and perceptions  5.  Express hope to the patient with realistic comments about the patient's strengths and resources.  5.  Give positive feedback after a tasks are achieved.  6.  Encourage identification of positive aspects of self.  7.  Educate the patient about crisis intervention services such as suicide hotlines and other resources.  Outcome: Progressing  Note: Depression screening complete and documented       Patient is not progressing towards the following goals:

## 2024-12-29 NOTE — ED PROVIDER NOTES
ED Provider Note    CHIEF COMPLAINT  Chief Complaint   Patient presents with    Chest Pain     x3 hours; pain across both sides of upper chest and radiates to left shoulder    Shortness of Breath     x3 hours    Epigastric Pain     Started after pt finished eating dinner around 1900; +nausea but pt denies vomiting or diarrhea       EXTERNAL RECORDS REVIEWED  Outpatient Notes patient seen in endocrinology clinic 11/25/2024 in follow-up of postsurgical hypothyroidism and papillary thyroid carcinoma.  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  She is on Synthroid and liothyronine whose doses have been unchanged for the past 2 years.  There was no evidence for recurrence of her thyroid cancer during this visit.    HPI/ROS  LIMITATION TO HISTORY   Select: : None  OUTSIDE HISTORIAN(S):  None    Ifrha Brunson is a 49 y.o. female with a history of elevated BMI and hyperlipidemia who presents with a chief complaint of pleuritic chest pain that started last night after eating dinner.  She felt discomfort in the epigastric region initially which made it difficult for her to fall asleep.  She took some antacids and this seemed to improve her symptoms enough to go to sleep but when she awoke to use the restroom her symptoms returned and were significantly worse.  She had associated diaphoresis, shortness of breath and nausea but did not vomit.  She did not take any additional medications for her symptoms.  She has a history of GERD but has never experienced symptoms such as these.  She does not have a history of hypertension or diabetes and does not smoke cigarettes.  She herself has never had an MI.  She does not have any first-degree relatives with history of early CAD.  She denies any hemoptysis, unilateral leg swelling, exogenous hormone use, history of DVT/PE, recent long distance travel/surgery/immobilizations.  " She does not use alcohol and denies any illicit drug use.    PAST MEDICAL HISTORY   has a past medical history of Bowel habit changes, Cancer (HCC) (2012), Hiatus hernia syndrome (2022), High cholesterol (2023), Indigestion (2022), PONV (postoperative nausea and vomiting) (2010 and 2012), Post-surgical hypothyroidism (01/01/2012), Psychiatric problem (1990), and Snoring.    SURGICAL HISTORY   has a past surgical history that includes cholecystectomy (01/01/2009); gyn surgery (2016); other (2012); craniotomy (Right, 05/18/2023); thoracoscopy,dx no bx (Right, 4/1/2024); and tube thoracostomy includes water seal (Right, 4/1/2024).    FAMILY HISTORY  Family History   Problem Relation Age of Onset    Hyperlipidemia Mother     Heart Disease Father     Hyperlipidemia Father     Psychiatric Illness Father         depression    Diabetes Father         Type 2    Arterial Aneurysm Father         Thoracic and abdominal aortic    Cancer Maternal Grandmother         Breast Cancer/Lung Cancer    Breast Cancer Maternal Grandmother     Diabetes Paternal Grandmother         Type 2    Heart Disease Maternal Grandfather     Hypertension Maternal Grandfather     Hyperlipidemia Maternal Grandfather     Heart Disease Paternal Grandfather     Hypertension Paternal Grandfather     Hyperlipidemia Paternal Grandfather     Cancer Maternal Uncle         Adrenal Cancer    Cancer Paternal Uncle         Colon cancer    Colorectal Cancer Paternal Uncle     Stroke Neg Hx        SOCIAL HISTORY  Social History     Tobacco Use    Smoking status: Former     Current packs/day: 0.00     Types: Cigarettes     Quit date: 1/16/2009     Years since quitting: 15.9    Smokeless tobacco: Never    Tobacco comments:     Social smoker   Vaping Use    Vaping status: Never Used   Substance and Sexual Activity    Alcohol use: Yes     Alcohol/week: 1.2 oz     Types: 1 Glasses of wine, 1 Cans of beer per week     Comment: Occasional drinker, \"couple times a month, " "maybe\"    Drug use: No    Sexual activity: Yes     Partners: Male     Comment: singe       CURRENT MEDICATIONS  Home Medications       Reviewed by Veronica Rivera R.N. (Registered Nurse) on 12/29/24 at 0506  Med List Status: Not Addressed     Medication Last Dose Status   acetaminophen (TYLENOL) 500 MG Tab  Active   albuterol 108 (90 Base) MCG/ACT Aero Soln inhalation aerosol  Active   buPROPion (WELLBUTRIN XL) 300 MG XL tablet  Active   cetirizine (ZYRTEC) 10 MG Tab  Active   fluticasone (FLONASE) 50 MCG/ACT nasal spray  Active   ibuprofen (MOTRIN) 200 MG Tab  Active   liothyronine (CYTOMEL) 5 MCG Tab  Active   methocarbamol (ROBAXIN) 750 MG Tab  Active   montelukast (SINGULAIR) 10 MG Tab  Active   pantoprazole (PROTONIX) 40 MG Tablet Delayed Response  Active   scopolamine (TRANSDERM SCOP, 1.5 MG,) 1 mg/72hr PATCH 72 HR  Active   SYNTHROID 175 MCG Tab  Active   traZODone (DESYREL) 50 MG Tab  Active                  Audit from Redirected Encounters    **Home medications have not yet been reviewed for this encounter**         ALLERGIES  Allergies   Allergen Reactions    Latex Rash     rash    Morphine Hives and Itching     Torso and arms       PHYSICAL EXAM  VITAL SIGNS: /84 Comment: Left arm  Pulse 85   Temp 35.8 °C (96.5 °F) (Temporal)   Resp 20   Ht 1.651 m (5' 5\")   Wt 94.5 kg (208 lb 5.4 oz)   SpO2 96%   BMI 34.67 kg/m²    Physical Exam  Vitals and nursing note reviewed.   Constitutional:       Appearance: She is well-developed.   HENT:      Head: Normocephalic and atraumatic.   Eyes:      Extraocular Movements: Extraocular movements intact.      Pupils: Pupils are equal, round, and reactive to light.   Cardiovascular:      Rate and Rhythm: Normal rate and regular rhythm.      Heart sounds: Normal heart sounds.   Pulmonary:      Effort: Pulmonary effort is normal.      Breath sounds: Normal breath sounds.   Chest:      Chest wall: Tenderness present.   Abdominal:      Palpations: Abdomen is soft. "      Tenderness: There is no abdominal tenderness.   Musculoskeletal:      Cervical back: Normal range of motion and neck supple.      Right lower leg: No edema.      Left lower leg: No edema.   Skin:     General: Skin is warm and dry.   Neurological:      General: No focal deficit present.      Mental Status: She is alert and oriented to person, place, and time.   Psychiatric:         Mood and Affect: Mood normal.         Behavior: Behavior normal.       EKG/LABS  Results for orders placed or performed during the hospital encounter of 12/29/24   CBC WITH DIFFERENTIAL    Collection Time: 12/29/24  5:14 AM   Result Value Ref Range    WBC 10.3 4.8 - 10.8 K/uL    RBC 5.30 4.20 - 5.40 M/uL    Hemoglobin 15.4 12.0 - 16.0 g/dL    Hematocrit 44.6 37.0 - 47.0 %    MCV 84.2 81.4 - 97.8 fL    MCH 29.1 27.0 - 33.0 pg    MCHC 34.5 32.2 - 35.5 g/dL    RDW 38.8 35.9 - 50.0 fL    Platelet Count 267 164 - 446 K/uL    MPV 8.9 (L) 9.0 - 12.9 fL    Neutrophils-Polys 68.00 44.00 - 72.00 %    Lymphocytes 22.70 22.00 - 41.00 %    Monocytes 7.40 0.00 - 13.40 %    Eosinophils 1.10 0.00 - 6.90 %    Basophils 0.40 0.00 - 1.80 %    Immature Granulocytes 0.40 0.00 - 0.90 %    Nucleated RBC 0.00 0.00 - 0.20 /100 WBC    Neutrophils (Absolute) 6.99 1.82 - 7.42 K/uL    Lymphs (Absolute) 2.33 1.00 - 4.80 K/uL    Monos (Absolute) 0.76 0.00 - 0.85 K/uL    Eos (Absolute) 0.11 0.00 - 0.51 K/uL    Baso (Absolute) 0.04 0.00 - 0.12 K/uL    Immature Granulocytes (abs) 0.04 0.00 - 0.11 K/uL    NRBC (Absolute) 0.00 K/uL   COMP METABOLIC PANEL    Collection Time: 12/29/24  5:14 AM   Result Value Ref Range    Sodium 137 135 - 145 mmol/L    Potassium 3.9 3.6 - 5.5 mmol/L    Chloride 101 96 - 112 mmol/L    Co2 21 20 - 33 mmol/L    Anion Gap 15.0 7.0 - 16.0    Glucose 138 (H) 65 - 99 mg/dL    Bun 21 8 - 22 mg/dL    Creatinine 0.90 0.50 - 1.40 mg/dL    Calcium 10.1 8.5 - 10.5 mg/dL    Correct Calcium 9.6 8.5 - 10.5 mg/dL    AST(SGOT) 9 (L) 12 - 45 U/L    ALT(SGPT)  12 2 - 50 U/L    Alkaline Phosphatase 58 30 - 99 U/L    Total Bilirubin 0.4 0.1 - 1.5 mg/dL    Albumin 4.6 3.2 - 4.9 g/dL    Total Protein 7.5 6.0 - 8.2 g/dL    Globulin 2.9 1.9 - 3.5 g/dL    A-G Ratio 1.6 g/dL   LIPASE    Collection Time: 24  5:14 AM   Result Value Ref Range    Lipase 37 11 - 82 U/L   HCG QUAL SERUM    Collection Time: 24  5:14 AM   Result Value Ref Range    Beta-Hcg Qualitative Serum Negative Negative   proBrain Natriuretic Peptide, NT (BNP_    Collection Time: 24  5:14 AM   Result Value Ref Range    NT-proBNP <36 0 - 125 pg/mL   Troponins NOW    Collection Time: 24  5:14 AM   Result Value Ref Range    Troponin T <6 6 - 19 ng/L   ESTIMATED GFR    Collection Time: 24  5:14 AM   Result Value Ref Range    GFR (CKD-EPI) 78 >60 mL/min/1.73 m 2   EKG (NOW)    Collection Time: 24  6:14 AM   Result Value Ref Range    Report       [facility] Emergency Dept.    Test Date:  2024  Pt Name:    MALLORIE BAILEY            Department: ER  MRN:        [id number]                      Room:  Gender:     Female                       Technician: [id number]  :        [id number]                   Requested By:ER TRIAGE PROTOCOL  Order #:    [id number]                    Reading MD: Nathaniel Gavin MD    Measurements  Intervals                                Axis  Rate:       84                           P:          31  OK:         167                          QRS:        6  QRSD:       79                           T:          52  QT:         356  QTc:        421    Interpretive Statements  Sinus rhythm  Anterior infarct, old  Compared to ECG 2024 11:38:00  Myocardial infarct finding now present  Electronically Signed On 2024 06:13:59 PST by Nathaniel Gavin MD       I have independently interpreted this EKG    RADIOLOGY/PROCEDURES   I have independently interpreted the diagnostic imaging associated with this visit and am waiting the final reading from the  radiologist.   My preliminary interpretation is as follows: No lobar pneumonia    Radiologist interpretation:  DX-CHEST-PORTABLE (1 VIEW)   Final Result      No acute process. Right hemidiaphragm elevation again noted.          COURSE & MEDICAL DECISION MAKING    ASSESSMENT, COURSE AND PLAN  Care Narrative: This is a very pleasant 49-year-old female with a history of elevated BMI and hyperlipidemia who is here with epigastric and substernal chest pain, shortness of breath, diaphoresis, and nausea that started last night and worsened today.    Differential diagnosis includes, but is not limited to, ACS, stable angina, PE, aortic dissection, GERD, esophageal spasm, gastritis, duodenal ulcer, PUD, pancreatitis, pneumonia, pericarditis, myocarditis.    Arrives hypertensive but afebrile with otherwise normal vital signs.  Appears well-hydrated and nontoxic.  Exam reveals mild tenderness in the epigastric region and right upper quadrant but she is not peritoneal.  She does not have a gallbladder.    EKG is without signs of acute ischemia and troponin is negative.  CBC without leukocytosis or anemia.  Metabolic panel reveals normal electrolytes, renal, and liver function.  Blood glucose is elevated to 138.  D-dimer is negative.  Chest x-ray without signs of acute process.  Heart score is 4.  Patient will benefit from hospitalization for additional workup and management.  She was given a dose of aspirin.  She concurs with plan.  Patient was discussed with the hospitalist and admitted in guarded condition.    CHEST PAIN:   HEART Score for Major Cardiac Events  HEART Score     History: Highly suspicious  ECG: Normal  Age: 45-64  Risk Factors: 1-2 risk factors  Troponin: Less than or equal to normal limit    Heart Score: 4    Total Score   0-3 Points = Low Score, risk of MACE 0.9-1.7%.  4-6 Points = Moderate Score, risk of MACE 12-16.6%  7-10 Points = High Score, risk of MACE 50-65%    ADDITIONAL PROBLEMS  MANAGED  None    DISPOSITION AND DISCUSSIONS  I have discussed management of the patient with the following physicians and MARCUS's: Dr. Fernandez, hospitalist    Discussion of management with other hospitals or appropriate source(s): None     Escalation of care considered, and ultimately not performed: N/A    Barriers to care at this time, including but not limited to:  None .     Decision tools and prescription drugs considered including, but not limited to:  N/A .    FINAL DIAGNOSIS  1.  Chest pain     Electronically signed by: Nathaniel Gavin M.D., 12/29/2024 6:11 AM

## 2024-12-29 NOTE — ED NOTES
Report given to Jennifer SHEPPARD.  Pt to T211, on zoll in stable condition with ACLS RN, all belongings with pt.

## 2024-12-29 NOTE — H&P
Hospital Medicine History & Physical Note    Date of Service  12/29/2024    Primary Care Physician  TYREL Chisholm    Consultants      Code Status  Full Code    Chief Complaint  Chief Complaint   Patient presents with    Chest Pain     x3 hours; pain across both sides of upper chest and radiates to left shoulder    Shortness of Breath     x3 hours    Epigastric Pain     Started after pt finished eating dinner around 1900; +nausea but pt denies vomiting or diarrhea       History of Presenting Illness  Ifrah Brunson is a 49 y.o. female who presented 12/29/2024 with past medical history of hypothyroidism who came in complaining of epigastric/chest pain ongoing on and off since yesterday around suppertime.  Apparently around suppertime yesterday she had some epigastric discomfort for which she took some antiacids and made the pain slightly better.  Then she went to bed.  However when she woke up this morning the pain had come back and radiated towards her chest dull ache constant worse with inspiration.  Made it better or worse she came to the emergency department for further evaluation.  Her troponin was negative.  EKG nondiagnostic.  Patient referred to me for the care and management patient does a history of hypothyroidism for which she is currently noted to be suppressing her TSH.  She was being seen by her endocrinologist who is currently tapering her current dose of Synthroid.  Her current regiment is Synthroid 175 mcg daily for 5 days and a half a dose on Saturday and no dose on Sunday.  It has been 30 days since her last TSH check on I checked it today her TSH is still very low and after discussion we recommending that we cut back to 150 mcg today and patient is agreeable.  We will start that on on Monday.  Regards to her chest pain she denies any viral syndrome prior to this event.  She denies any travel.  No leg swelling.  Her D-dimer is within normal limits.  She does have a  history of partial removal of her lung in April.  She states that she is just starting back in regards to getting exercise.  States she does get short of breath with size so we will not be doing treadmill stress test    I discussed the plan of care with patient.    Review of Systems  Review of Systems   Constitutional: Negative.    Respiratory:  Positive for shortness of breath.    Cardiovascular:  Positive for chest pain.   Gastrointestinal: Negative.    Genitourinary: Negative.    Musculoskeletal: Negative.    Neurological: Negative.    Psychiatric/Behavioral: Negative.         Past Medical History   has a past medical history of Bowel habit changes, Cancer (HCC) (2012), Chest pain (12/29/2024), Hiatus hernia syndrome (2022), High cholesterol (2023), Indigestion (2022), PONV (postoperative nausea and vomiting) (2010 and 2012), Post-surgical hypothyroidism (01/01/2012), Psychiatric problem (1990), and Snoring.    Surgical History   has a past surgical history that includes cholecystectomy (01/01/2009); gyn surgery (2016); other (2012); craniotomy (Right, 05/18/2023); pr thoracoscopy,dx no bx (Right, 4/1/2024); and pr tube thoracostomy includes water seal (Right, 4/1/2024).     Family History  family history includes Arterial Aneurysm in her father; Breast Cancer in her maternal grandmother; Cancer in her maternal grandmother, maternal uncle, and paternal uncle; Colorectal Cancer in her paternal uncle; Diabetes in her father and paternal grandmother; Heart Disease in her father, maternal grandfather, and paternal grandfather; Hyperlipidemia in her father, maternal grandfather, mother, and paternal grandfather; Hypertension in her maternal grandfather and paternal grandfather; Psychiatric Illness in her father.   Family history reviewed with patient. There is no family history that is pertinent to the chief complaint.     Social History   reports that she quit smoking about 15 years ago. Her smoking use included  cigarettes. She has never used smokeless tobacco. She reports current alcohol use of about 1.2 oz of alcohol per week. She reports that she does not use drugs.    Allergies  Allergies   Allergen Reactions    Latex Rash     rash    Morphine Hives and Itching     Torso and arms       Medications  Prior to Admission Medications   Prescriptions Last Dose Informant Patient Reported? Taking?   SYNTHROID 175 MCG Tab 12/28/2024 Morning Patient No Yes   Sig: Take 1 Tablet by mouth every morning on an empty stomach.   Patient taking differently: Take 175 mcg by mouth see administration instructions. 1 tablet = 175mcg Monday - Friday  1/2 tablet = 87.5mcg on Saturday  No dose on Sunday   acetaminophen (TYLENOL) 500 MG Tab 12/15/2024 Patient Yes No   Sig: Take 1,000 mg by mouth 3 times a day as needed for Moderate Pain.   albuterol 108 (90 Base) MCG/ACT Aero Soln inhalation aerosol 12/8/2024 Patient No Yes   Sig: Inhale 2 Puffs every four hours as needed for Shortness of Breath.   buPROPion (WELLBUTRIN XL) 300 MG XL tablet 12/28/2024 Morning Patient No Yes   Sig: TAKE 1 TABLET BY MOUTH EVERY MORNING   cetirizine (ZYRTEC) 10 MG Tab 12/28/2024 Evening Patient Yes Yes   Sig: Take 10 mg by mouth every morning.   ibuprofen (MOTRIN) 200 MG Tab 12/28/2024 Patient Yes Yes   Sig: Take 600 mg by mouth every 8 hours as needed for Mild Pain.   liothyronine (CYTOMEL) 5 MCG Tab 12/28/2024 Morning Patient No Yes   Sig: Take 1 Tablet by mouth every day.   montelukast (SINGULAIR) 10 MG Tab 12/22/2024 Patient No No   Sig: TAKE ONE TABLET BY MOUTH ONE TIME DAILY   Patient taking differently: Take 10 mg by mouth every day. PT RAN OUT   pantoprazole (PROTONIX) 40 MG Tablet Delayed Response 12/28/2024 Morning Patient No Yes   Sig: TAKE ONE TABLET BY MOUTH ONE TIME DAILY   phentermine 37.5 MG capsule 12/28/2024 Morning Patient Yes Yes   Sig: Take 37.5 mg by mouth every morning.   traZODone (DESYREL) 50 MG Tab 12/27/2024 Evening Patient No No   Sig:  Take 1 Tablet by mouth at bedtime as needed for Sleep.      Facility-Administered Medications: None       Physical Exam  Temp:  [35.8 °C (96.5 °F)-36.4 °C (97.5 °F)] 36.4 °C (97.5 °F)  Pulse:  [82-95] 82  Resp:  [18-20] 18  BP: (117-131)/(78-84) 123/83  SpO2:  [95 %-98 %] 95 %  Blood Pressure: 123/83   Temperature: 36.4 °C (97.5 °F)   Pulse: 82   Respiration: 18   Pulse Oximetry: 95 %       Physical Exam  Vitals reviewed.   Constitutional:       General: She is not in acute distress.     Appearance: She is not ill-appearing, toxic-appearing or diaphoretic.   HENT:      Head: Atraumatic.   Eyes:      Extraocular Movements: Extraocular movements intact.   Cardiovascular:      Rate and Rhythm: Normal rate and regular rhythm.      Heart sounds: No murmur heard.     No gallop.   Pulmonary:      Effort: No respiratory distress.      Breath sounds: No stridor. No wheezing or rhonchi.   Chest:      Chest wall: Tenderness present.   Abdominal:      General: There is no distension.      Palpations: There is no mass.      Tenderness: There is no abdominal tenderness. There is no guarding.   Musculoskeletal:      Cervical back: Normal range of motion.      Right lower leg: No edema.      Left lower leg: No edema.   Skin:     Capillary Refill: Capillary refill takes 2 to 3 seconds.      Coloration: Skin is not jaundiced or pale.      Findings: No bruising or erythema.   Neurological:      General: No focal deficit present.      Mental Status: She is oriented to person, place, and time.   Psychiatric:         Mood and Affect: Mood normal.         Behavior: Behavior normal.         Laboratory:  Recent Labs     12/29/24 0514   WBC 10.3   RBC 5.30   HEMOGLOBIN 15.4   HEMATOCRIT 44.6   MCV 84.2   MCH 29.1   MCHC 34.5   RDW 38.8   PLATELETCT 267   MPV 8.9*     Recent Labs     12/29/24 0514   SODIUM 137   POTASSIUM 3.9   CHLORIDE 101   CO2 21   GLUCOSE 138*   BUN 21   CREATININE 0.90   CALCIUM 10.1     Recent Labs     12/29/24 0514    ALTSGPT 12   ASTSGOT 9*   ALKPHOSPHAT 58   TBILIRUBIN 0.4   LIPASE 37   GLUCOSE 138*         Recent Labs     12/29/24  0514   NTPROBNP <36         Recent Labs     12/29/24  0514 12/29/24  0712 12/29/24  0959   TROPONINT <6 <6 <6       Imaging:  DX-CHEST-PORTABLE (1 VIEW)   Final Result      No acute process. Right hemidiaphragm elevation again noted.      EC-ECHOCARDIOGRAM COMPLETE W/O CONT    (Results Pending)   NM-CARDIAC STRESS TEST    (Results Pending)       EKG:  I have personally reviewed the images and compared with prior images.    EKG reviewed me shows Q waves anteriorly and normal sinus rhythm rate 84..  I compared these results to an EKG thatwas done in March of this year and I did not appreciate Q waves in that EKG    Assessment/Plan:  Justification for Admission Status  I anticipate this patient is appropriate for observation status at this time         * Chest pain- (present on admission)  Assessment & Plan  The 10-year ASCVD risk score (Sara SÁNCHEZ, et al., 2019) is: 1.2%    Values used to calculate the score:      Age: 49 years      Sex: Female      Is Non- : No      Diabetic: No      Tobacco smoker: No      Systolic Blood Pressure: 123 mmHg      Is BP treated: No      HDL Cholesterol: 50 mg/dL      Total Cholesterol: 200 mg/dL      Here to rule out for possible ACS.    Recommend getting ESR CRP to assess for inflammatory markers patient may have pericarditis.  Will check an echocardiogram.  Check serial troponins.  If troponins negative patient go for a Persantine thallium stress test when available        Low serum thyroid stimulating hormone (TSH)- (present on admission)  Assessment & Plan  Due to current dosage of patient's levothyroxine.    Patient agreeable  to cut back to 125 mcg daily        VTE prophylaxis: SCDs/TEDs

## 2024-12-30 PROBLEM — M94.0 COSTOCHONDRITIS, ACUTE: Status: ACTIVE | Noted: 2024-12-30

## 2024-12-30 RX ORDER — BUPROPION HYDROCHLORIDE 300 MG/1
300 TABLET ORAL EVERY MORNING
Qty: 90 TABLET | Refills: 0 | Status: SHIPPED | OUTPATIENT
Start: 2024-12-30

## 2024-12-30 NOTE — PROGRESS NOTES
Bedside shift report received from SILVER Rodriguez. Patient A&Ox4, in bed resting comfortably, reports mild chest pain.  at bedside. Pt requesting to discharge as cardiac work-up negative so far. Reports toradol effective for pain. Updated JIMMIE Reyna on diagnostics and pt's request to discharge. Provider agreeable to discharge. Pt updated on POC.

## 2024-12-30 NOTE — TELEPHONE ENCOUNTER
Received request via: Pharmacy    Was the patient seen in the last year in this department? Yes    Does the patient have an active prescription (recently filled or refills available) for medication(s) requested? No    [facility]    Does the patient have MCFP Plus and need 100-day supply? (This applies to ALL medications) Patient does not have SCP

## 2024-12-30 NOTE — PROGRESS NOTES
Discharge Instructions    Discharged to home by car with relative. Discharged via wheelchair, hospital escort: Yes.  Special equipment needed: Not Applicable    Be sure to schedule a follow-up appointment with your primary care doctor or any specialists as instructed.     Discharge Plan:   Diet Plan: Discussed  Activity Level: Discussed  Confirmed Follow up Appointment: Appointment Scheduled  Confirmed Symptoms Management: Discussed  Medication Reconciliation Updated: Yes  Influenza Vaccine Indication: Patient Refuses      Special Instructions: None    -Is this patient being discharged with medication to prevent blood clots?  No    Is patient discharged on Warfarin / Coumadin?   No     Discussed all discharge instructions and follow-up appointments with patient and . Verbalized understanding of education provided. Removed PIV. Pt left unit via wheelchair with SHABBIR Meza at 1928.

## 2024-12-30 NOTE — DISCHARGE INSTRUCTIONS
Diet    Resume your normal diet as tolerated.  A diet low in cholesterol, fat, and sodium is recommended for good health.   Activity    Resume Your Normal Activity    You may resume your normal activity as tolerated.  Rest as needed.

## 2024-12-30 NOTE — DISCHARGE SUMMARY
Discharge Summary    CHIEF COMPLAINT ON ADMISSION  Chief Complaint   Patient presents with    Chest Pain     x3 hours; pain across both sides of upper chest and radiates to left shoulder    Shortness of Breath     x3 hours    Epigastric Pain     Started after pt finished eating dinner around 1900; +nausea but pt denies vomiting or diarrhea       Reason for Admission  Chest Pain; Shortness of Breath     Admission Date  12/29/2024    CODE STATUS  Prior    HPI & HOSPITAL COURSE  Ifrah Brunson is a 49 y.o. female who presented 12/29/2024 with past medical history of hypothyroidism who came in complaining of epigastric/chest pain ongoing on and off since yesterday around suppertime.  Apparently around suppertime yesterday she had some epigastric discomfort for which she took some antiacids and made the pain slightly better.  Then she went to bed.  However when she woke up this morning the pain had come back and radiated towards her chest dull ache constant worse with inspiration.  Made it better or worse she came to the emergency department for further evaluation.  Her troponin was negative.  EKG nondiagnostic.  Patient referred to me for the care and management patient does a history of hypothyroidism for which she is currently noted to be suppressing her TSH.  She was being seen by her endocrinologist who is currently tapering her current dose of Synthroid.  Her current regiment is Synthroid 175 mcg daily for 5 days and a half a dose on Saturday and no dose on Sunday.  It has been 30 days since her last TSH check on I checked it today her TSH is still very low and after discussion we recommending that we cut back to 150 mcg today and patient is agreeable.  We will start that on on Monday.  Regards to her chest pain she denies any viral syndrome prior to this event.  She denies any travel.  No leg swelling.  Her D-dimer is within normal limits.  She does have a history of partial removal of her lung  in April.  She states that she is just starting back in regards to getting exercise.  States she does get short of breath with size so we will not be doing treadmill stress test   =================================    Patient underwent a Persantine stress test as well as an echocardiogram.  They were both within normal limits.  The patient did not have any friction rub on auscultation.  The patient's pain was worse with inspiration but also worse with palpation all the way up into her anterior chest.  She had a normal ESR and CRP slightly elevated.  Her white count was normal.  She had no pericardial effusion.    Patient diagnosed with most likely with costochondritis patient instructed to take Motrin and Tylenol as needed.  Patient will give us a call if the OTC medicine is insufficient in regards to anti-inflammatory potency    I discussed with the patient her current regiment for her Synthroid.  It has been about 30 days since her current regimen and her TSH is still low we agreed on Synthroid 125 mcg p.o. daily and patient will follow-up with her endocrinologist for repeat TSH in 4 to 6 weeks.      Therefore, she is discharged in good and stable condition to home with close outpatient follow-up.    The patient recovered much more quickly than anticipated on admission.    Discharge Date  12/29/2024    FOLLOW UP ITEMS POST DISCHARGE      DISCHARGE DIAGNOSES  Principal Problem:    Chest pain (POA: Yes)  Active Problems:    Low serum thyroid stimulating hormone (TSH) (POA: Yes)    Costochondritis, acute (POA: Unknown)  Resolved Problems:    * No resolved hospital problems. *      FOLLOW UP  Future Appointments   Date Time Provider Department Center   1/6/2025  7:00 AM [facility]   1/21/2025  8:00 AM [facility]   3/26/2025 [facility]   4/11/2025  9:00 [facility]   4/14/2025  9:30 AM Adali Powell,  A.P.N. [facility] None     No follow-up provider specified.    MEDICATIONS ON DISCHARGE     Medication List        CHANGE how you take these medications        Instructions   levothyroxine 125 MCG Tabs  What changed:   medication strength  how much to take  Commonly known as: Synthroid   Take 1 Tablet by mouth every morning on an empty stomach.  Dose: 125 mcg            CONTINUE taking these medications        Instructions   acetaminophen 500 MG Tabs  Commonly known as: Tylenol   Take 1,000 mg by mouth 3 times a day as needed for Moderate Pain.  Dose: 1,000 mg     albuterol 108 (90 Base) MCG/ACT Aers inhalation aerosol   Doctor's comments: Give albuterol that is patient or insurance preference please  Inhale 2 Puffs every four hours as needed for Shortness of Breath.  Dose: 2 Puff     cetirizine 10 MG Tabs  Commonly known as: ZyrTEC   Take 10 mg by mouth every morning.  Dose: 10 mg     ibuprofen 200 MG Tabs  Commonly known as: Motrin   Take 600 mg by mouth every 8 hours as needed for Mild Pain.  Dose: 600 mg     liothyronine 5 MCG Tabs  Commonly known as: Cytomel   Take 1 Tablet by mouth every day.  Dose: 5 mcg     montelukast 10 MG Tabs  Commonly known as: Singulair   Take 1 Tablet by mouth every day. PT RAN OUT  Dose: 10 mg     pantoprazole 40 MG Tbec  Commonly known as: Protonix   TAKE ONE TABLET BY MOUTH ONE TIME DAILY  Dose: 40 mg     phentermine 37.5 MG capsule   Take 37.5 mg by mouth every morning.  Dose: 37.5 mg     traZODone 50 MG Tabs  Commonly known as: Desyrel   Take 1 Tablet by mouth at bedtime as needed for Sleep.  Dose: 50 mg              Allergies  Allergies   Allergen Reactions    Latex Rash     rash    Morphine Hives and Itching     Torso and arms       DIET  No orders of the defined types were placed in this encounter.      ACTIVITY  As tolerated.  Weight bearing as tolerated    CONSULTATIONS      PROCEDURES  Echo    Stress test    LABORATORY  Lab Results   Component Value Date    SODIUM 137  12/29/2024    POTASSIUM 3.9 12/29/2024    CHLORIDE 101 12/29/2024    CO2 21 12/29/2024    GLUCOSE 138 (H) 12/29/2024    BUN 21 12/29/2024    CREATININE 0.90 12/29/2024    GLOMRATE 83 01/03/2022        Lab Results   Component Value Date    WBC 10.3 12/29/2024    HEMOGLOBIN 15.4 12/29/2024    HEMATOCRIT 44.6 12/29/2024    PLATELETCT 267 12/29/2024      Patient admitted and discharged on the same date of 12/29/2024. Greater than 85 minutes taken on this date

## 2025-01-13 ENCOUNTER — PATIENT MESSAGE (OUTPATIENT)
Dept: MEDICAL GROUP | Facility: LAB | Age: 50
End: 2025-01-13
Payer: COMMERCIAL

## 2025-01-13 DIAGNOSIS — M94.0 COSTOCHONDRITIS: ICD-10-CM

## 2025-01-14 RX ORDER — METHYLPREDNISOLONE 4 MG/1
TABLET ORAL
Qty: 21 TABLET | Refills: 0 | Status: SHIPPED | OUTPATIENT
Start: 2025-01-14 | End: 2025-01-21

## 2025-01-21 ENCOUNTER — APPOINTMENT (OUTPATIENT)
Dept: MEDICAL GROUP | Facility: LAB | Age: 50
End: 2025-01-21
Payer: COMMERCIAL

## 2025-01-21 VITALS
HEART RATE: 84 BPM | RESPIRATION RATE: 14 BRPM | BODY MASS INDEX: 35.22 KG/M2 | TEMPERATURE: 97.2 F | OXYGEN SATURATION: 97 % | HEIGHT: 65 IN | WEIGHT: 211.4 LBS | SYSTOLIC BLOOD PRESSURE: 114 MMHG | DIASTOLIC BLOOD PRESSURE: 66 MMHG

## 2025-01-21 DIAGNOSIS — F41.9 ANXIETY AND DEPRESSION: ICD-10-CM

## 2025-01-21 DIAGNOSIS — R07.2 PRECORDIAL PAIN: ICD-10-CM

## 2025-01-21 DIAGNOSIS — M94.0 COSTOCHONDRITIS, ACUTE: ICD-10-CM

## 2025-01-21 DIAGNOSIS — F32.A ANXIETY AND DEPRESSION: ICD-10-CM

## 2025-01-21 DIAGNOSIS — Z09 HOSPITAL DISCHARGE FOLLOW-UP: ICD-10-CM

## 2025-01-21 PROBLEM — R65.10 SIRS (SYSTEMIC INFLAMMATORY RESPONSE SYNDROME) (HCC): Status: RESOLVED | Noted: 2024-04-08 | Resolved: 2025-01-21

## 2025-01-21 PROBLEM — G89.18 PAIN, ACUTE POSTOPERATIVE: Status: RESOLVED | Noted: 2024-04-09 | Resolved: 2025-01-21

## 2025-01-21 PROCEDURE — 99214 OFFICE O/P EST MOD 30 MIN: CPT | Performed by: NURSE PRACTITIONER

## 2025-01-21 PROCEDURE — 3074F SYST BP LT 130 MM HG: CPT | Performed by: NURSE PRACTITIONER

## 2025-01-21 PROCEDURE — 3078F DIAST BP <80 MM HG: CPT | Performed by: NURSE PRACTITIONER

## 2025-01-21 RX ORDER — CITALOPRAM HYDROBROMIDE 20 MG/1
20 TABLET ORAL DAILY
Qty: 30 TABLET | Refills: 3 | Status: SHIPPED | OUTPATIENT
Start: 2025-01-21

## 2025-01-21 ASSESSMENT — FIBROSIS 4 INDEX: FIB4 SCORE: 0.48

## 2025-01-21 NOTE — PROGRESS NOTES
Chief Complaint   Patient presents with    Medication Follow-up    Hospital Follow-up     Verbal consent was acquired by the patient to use [employer] ambient listening note generation during this visit Yes      HPI:  History of Present Illness  The patient presents for evaluation of costochondritis and depression.    She experienced an episode of what she thought was indigestion following a meal with her , which she initially attributed to a heavy meal. Despite taking an antacid, the discomfort persisted, preventing her from lying flat. The pain, described as a sensation of a brick on her diaphragm, radiated from her sternum to her left shoulder and progressively worsened. Accompanying symptoms included a change in skin color to gray, cold sweats, dizziness, and a near-collapse incident. She was taken to the ER where an EKG and lab tests were performed, revealing normal results except for an elevated C-reactive protein level. A full cardiac workup, including an echocardiogram and stress test, was conducted, all of which returned normal results. She was diagnosed with costochondritis, a musculoskeletal inflammation potentially caused by viruses. Her symptoms improved slightly, allowing her to return to work on Tuesday, but they flared up again on Friday. She was prescribed steroids, which provided relief. She also experienced difficulty taking deep breaths due to inflammation, and after a few days, deep breaths would trigger coughing. She continues to experience mild pain and is uncertain if it is residual from surgery or indicative of another issue. She has no fever and her white cell count has never been elevated. A chest x-ray was unremarkable. She found some relief by reclining slightly but could not lie flat due to severe pain. She slept in a recliner for approximately a week.    She has been questioning the efficacy of her current antidepressant, as she has been experiencing mood swings. She has an  "upcoming appointment with her gynecologist to determine if she is menopausal, as she continues to menstruate. Hormone therapy was suggested, but her gynecologist also recommended considering a different antidepressant that could alleviate menopausal symptoms. She suspects her symptoms may be seasonal, as they tend to worsen during periods of limited sunlight. She reports feeling better than she did in November and December but acknowledges that her overall mood remains suboptimal. She has been advised to check her cortisol levels and has an upcoming lab appointment with her endocrinologist.    Results:  Results  Laboratory Studies  C-reactive protein was slightly abnormal. White cell count was not elevated.    Imaging  Chest x-ray was unremarkable.    Testing  EKG came back normal. Echocardiogram and cardiac stress test came back normal.    ROS:  As documented in history of present illness above    Exam:   /66 (BP Location: Right arm, Patient Position: Sitting, BP Cuff Size: Adult)   Pulse 84   Temp 36.2 °C (97.2 °F)   Resp 14   Ht 1.651 m (5' 5\")   Wt 95.9 kg (211 lb 6.4 oz)   LMP 12/29/2015 (Approximate)   SpO2 97%   BMI 35.18 kg/m²     Constitutional: Alert, no distress, well-groomed.  Skin: Warm, dry, good turgor, no rashes in visible areas.  Eye: Equal, round and reactive, conjunctiva clear, lids normal.  ENMT: Lips without lesions, good dentition, moist mucous membranes.  Neck: Trachea midline, no masses, no thyromegaly.  Respiratory: Unlabored respiratory effort, no cough.  MSK: Normal gait, moves all extremities.  Neuro: Grossly non-focal.   Psych: Alert and oriented x3, normal affect and mood.    Assessment / Plan:  Assessment & Plan  1.  Hospital discharge follow-up  2.  Precordial pain  3. Costochondritis.  The patient's symptoms, including pain in the sternum and left shoulder, dizziness, and cold sweats, were evaluated in the ER with an EKG, labs, echocardiogram, and cardiac stress test, " all of which returned normal results. The diagnosis of costochondritis was made based on the presentation and response to steroids. She was prescribed a steroid, which alleviated her symptoms. She is advised to continue monitoring her symptoms and report any recurrence or worsening.    2.  Anxiety and depression.  The patient's depressive symptoms may be influenced by hormonal and seasonal changes. She will be initiated on Celexa 20 mg, to be taken concurrently with Wellbutrin. A morning cortisol level test will be conducted before 9 AM to rule out any abnormalities. The prescription for Celexa will be sent to the [facility] pharmacy on [address].    Follow-up  The patient will follow up in 1 month to assess her response to Celexa.    Please note that this dictation was created using voice recognition software. I have made every reasonable attempt to correct obvious errors, but I expect that there are errors of grammar and possibly content that I did not discover before finalizing the note.

## 2025-01-23 ENCOUNTER — TELEPHONE (OUTPATIENT)
Dept: MEDICAL GROUP | Facility: LAB | Age: 50
End: 2025-01-23
Payer: COMMERCIAL

## 2025-01-23 DIAGNOSIS — M54.2 NECK PAIN: ICD-10-CM

## 2025-01-23 DIAGNOSIS — R59.1 LYMPHADENOPATHY: ICD-10-CM

## 2025-01-23 NOTE — TELEPHONE ENCOUNTER
Reports that she is having pain in her left neck and down trapezius muscle. She is also having pain under her jaw, thinks that she might feel an enlarged lymph node. She does have a history of thyroid cancer, last US was 2/2024 and had stable lymph nodes bilaterally. US ordered. Has an appointment with endocrinology in March. Supportive care, differential diagnoses, and indications for immediate follow-up discussed with patient. Instructed to return to clinic or nearest emergency department for any change in condition, further concerns, or worsening of symptoms.

## 2025-02-05 ENCOUNTER — HOSPITAL ENCOUNTER (OUTPATIENT)
Dept: LAB | Facility: MEDICAL CENTER | Age: 50
End: 2025-02-05
Payer: COMMERCIAL

## 2025-02-05 ENCOUNTER — HOSPITAL ENCOUNTER (OUTPATIENT)
Dept: LAB | Facility: MEDICAL CENTER | Age: 50
End: 2025-02-05
Attending: NURSE PRACTITIONER
Payer: COMMERCIAL

## 2025-02-05 DIAGNOSIS — Z85.850 HISTORY OF THYROID CANCER: ICD-10-CM

## 2025-02-05 DIAGNOSIS — E89.0 POST-SURGICAL HYPOTHYROIDISM: ICD-10-CM

## 2025-02-05 DIAGNOSIS — E55.9 VITAMIN D DEFICIENCY: ICD-10-CM

## 2025-02-05 LAB
25(OH)D3 SERPL-MCNC: 56 NG/ML (ref 30–100)
ALBUMIN SERPL BCP-MCNC: 4.3 G/DL (ref 3.2–4.9)
ALBUMIN/GLOB SERPL: 1.2 G/DL
ALP SERPL-CCNC: 78 U/L (ref 30–99)
ALT SERPL-CCNC: 30 U/L (ref 2–50)
ANION GAP SERPL CALC-SCNC: 12 MMOL/L (ref 7–16)
AST SERPL-CCNC: 15 U/L (ref 12–45)
BILIRUB SERPL-MCNC: 0.4 MG/DL (ref 0.1–1.5)
BUN SERPL-MCNC: 22 MG/DL (ref 8–22)
CALCIUM ALBUM COR SERPL-MCNC: 9.6 MG/DL (ref 8.5–10.5)
CALCIUM SERPL-MCNC: 9.8 MG/DL (ref 8.5–10.5)
CHLORIDE SERPL-SCNC: 99 MMOL/L (ref 96–112)
CO2 SERPL-SCNC: 25 MMOL/L (ref 20–33)
CORTIS SERPL-MCNC: 13.8 UG/DL (ref 0–23)
CREAT SERPL-MCNC: 0.9 MG/DL (ref 0.5–1.4)
GFR SERPLBLD CREATININE-BSD FMLA CKD-EPI: 78 ML/MIN/1.73 M 2
GLOBULIN SER CALC-MCNC: 3.5 G/DL (ref 1.9–3.5)
GLUCOSE SERPL-MCNC: 92 MG/DL (ref 65–99)
POTASSIUM SERPL-SCNC: 4.6 MMOL/L (ref 3.6–5.5)
PROT SERPL-MCNC: 7.8 G/DL (ref 6–8.2)
SODIUM SERPL-SCNC: 136 MMOL/L (ref 135–145)
T3FREE SERPL-MCNC: 2.3 PG/ML (ref 2–4.4)
T4 FREE SERPL-MCNC: 1.26 NG/DL (ref 0.93–1.7)
TSH SERPL-ACNC: 1.59 UIU/ML (ref 0.35–5.5)

## 2025-02-05 PROCEDURE — 80053 COMPREHEN METABOLIC PANEL: CPT

## 2025-02-05 PROCEDURE — 36415 COLL VENOUS BLD VENIPUNCTURE: CPT

## 2025-02-05 PROCEDURE — 84481 FREE ASSAY (FT-3): CPT

## 2025-02-05 PROCEDURE — 82533 TOTAL CORTISOL: CPT

## 2025-02-05 PROCEDURE — 84439 ASSAY OF FREE THYROXINE: CPT

## 2025-02-05 PROCEDURE — 82306 VITAMIN D 25 HYDROXY: CPT

## 2025-02-05 PROCEDURE — 84432 ASSAY OF THYROGLOBULIN: CPT

## 2025-02-05 PROCEDURE — 86800 THYROGLOBULIN ANTIBODY: CPT

## 2025-02-05 PROCEDURE — 84443 ASSAY THYROID STIM HORMONE: CPT

## 2025-02-27 ENCOUNTER — APPOINTMENT (OUTPATIENT)
Dept: RADIOLOGY | Facility: MEDICAL CENTER | Age: 50
End: 2025-02-27
Attending: NURSE PRACTITIONER
Payer: COMMERCIAL

## 2025-02-27 DIAGNOSIS — M54.2 NECK PAIN: ICD-10-CM

## 2025-02-27 DIAGNOSIS — R59.1 LYMPHADENOPATHY: ICD-10-CM

## 2025-02-27 PROCEDURE — 76536 US EXAM OF HEAD AND NECK: CPT

## 2025-03-06 ENCOUNTER — TELEPHONE (OUTPATIENT)
Dept: MEDICAL GROUP | Facility: LAB | Age: 50
End: 2025-03-06
Payer: COMMERCIAL

## 2025-03-06 DIAGNOSIS — R93.89 ABNORMAL ULTRASOUND OF NECK: ICD-10-CM

## 2025-03-06 DIAGNOSIS — Z85.850 HISTORY OF THYROID CANCER: ICD-10-CM

## 2025-03-06 DIAGNOSIS — C7A.090 MALIGNANT CARCINOID TUMOR OF BRONCHUS AND LUNG (HCC): ICD-10-CM

## 2025-03-06 NOTE — PROGRESS NOTES
Patient reached out via [employer] with regards to an ultrasound finding on 2/27/2025.  She has a mildly prominent abnormal appearing lymph node in the right superior neck.  We are monitoring her for her lung cancer.  She is due for follow-up CT scan of the chest next month, and I discussed with patient that I will add on neck as well for further evaluation.

## 2025-03-06 NOTE — TELEPHONE ENCOUNTER
Called patient to discuss imaging results. She will be getting a follow up CT. All questions answered.

## 2025-03-23 DIAGNOSIS — F32.A ANXIETY AND DEPRESSION: ICD-10-CM

## 2025-03-23 DIAGNOSIS — F41.9 ANXIETY AND DEPRESSION: ICD-10-CM

## 2025-03-23 NOTE — TELEPHONE ENCOUNTER
Received request via: Pharmacy    Was the patient seen in the last year in this department? Yes  LOV : 1/21/2025  Does the patient have an active prescription (recently filled or refills available) for medication(s) requested? No    Pharmacy Name: [facility]    Does the patient have senior living Plus and need 100-day supply? (This applies to ALL medications) Patient does not have SCP

## 2025-03-26 ENCOUNTER — OFFICE VISIT (OUTPATIENT)
Dept: ENDOCRINOLOGY | Facility: MEDICAL CENTER | Age: 50
End: 2025-03-26
Payer: COMMERCIAL

## 2025-03-26 VITALS
DIASTOLIC BLOOD PRESSURE: 86 MMHG | SYSTOLIC BLOOD PRESSURE: 126 MMHG | OXYGEN SATURATION: 96 % | HEIGHT: 66 IN | HEART RATE: 88 BPM | BODY MASS INDEX: 34.07 KG/M2 | WEIGHT: 212 LBS

## 2025-03-26 DIAGNOSIS — E89.0 POST-SURGICAL HYPOTHYROIDISM: ICD-10-CM

## 2025-03-26 DIAGNOSIS — E66.09 CLASS 1 OBESITY DUE TO EXCESS CALORIES WITHOUT SERIOUS COMORBIDITY WITH BODY MASS INDEX (BMI) OF 34.0 TO 34.9 IN ADULT: ICD-10-CM

## 2025-03-26 DIAGNOSIS — E66.811 CLASS 1 OBESITY DUE TO EXCESS CALORIES WITHOUT SERIOUS COMORBIDITY WITH BODY MASS INDEX (BMI) OF 34.0 TO 34.9 IN ADULT: ICD-10-CM

## 2025-03-26 DIAGNOSIS — E55.9 VITAMIN D DEFICIENCY: ICD-10-CM

## 2025-03-26 DIAGNOSIS — Z85.850 HISTORY OF THYROID CANCER: ICD-10-CM

## 2025-03-26 PROCEDURE — 3079F DIAST BP 80-89 MM HG: CPT

## 2025-03-26 PROCEDURE — 3074F SYST BP LT 130 MM HG: CPT

## 2025-03-26 PROCEDURE — 99214 OFFICE O/P EST MOD 30 MIN: CPT

## 2025-03-26 PROCEDURE — 99211 OFF/OP EST MAY X REQ PHY/QHP: CPT

## 2025-03-26 RX ORDER — PHENTERMINE HYDROCHLORIDE 37.5 MG/1
37.5 CAPSULE ORAL EVERY MORNING
Qty: 30 CAPSULE | Refills: 0 | Status: SHIPPED | OUTPATIENT
Start: 2025-03-26 | End: 2025-04-25

## 2025-03-26 RX ORDER — LEVOTHYROXINE SODIUM 125 UG/1
125 TABLET ORAL
Qty: 90 TABLET | Refills: 3 | Status: SHIPPED | OUTPATIENT
Start: 2025-03-26

## 2025-03-26 ASSESSMENT — FIBROSIS 4 INDEX: FIB4 SCORE: 0.5

## 2025-03-26 NOTE — PROGRESS NOTES
CHIEF COMPLAINT: Followup of postsurgical hypothyroidism and papillary thyroid carcinoma.    HPI:   Ifrah Brunson is a 49 y.o. female, who had initial total thyroidectomy on Oct 2012 by Dr. Harris with pathology revealing classic PTC, 2.7 cm in greatest dimension, 5/28 lymph nodes involved, margins uninvolved, capsular invasion present, no extra thyroidal extension .    She did not have ZAVALETA post thyroidectomy as she was told she a was low risk.     She did have problems with postoperative hypocalcemia due to having one of the Parathyroid glands removed.  Underwent thyroid ultrasound on 07/14/2023. Numerous lymph nodes were noted. She had FNA of 2 suspicious lymph nodes earlier this year, no evidence of malignancy. TG was undetectable in the aspirates.     She has history of lung neuroendocrine carcinoid tumor. She had surgical removal in April 2024. This is followed by oncology.     Neck ultrasound was last done on 2/22/24 with no evidence of recurrence.   She is having a CT soft tissue head and neck which was ordered by her oncologist due to lymph nodes found in her US of thyroid.     Denies voice changes  Denies SOB  Denies Difficulty swallowing.   Latest Reference Range & Units 02/05/25 07:30   Thyroglobulin by LC-MC/MS-S/P 1.3 - 31.8 ng/mL Not Applicable   Thyroglobulin 1.3 - 31.8 ng/mL <0.1 (L)   Anti-Thyroglobulin 0.0 - 4.0 IU/mL <0.9       Post Surgical Hypothyroidism  Since last visit, she has remained on synthroid 125 micrograms daily and liothyronine 5 mcg daily, which has been her dose since Dec 2024  Her dose was decreased by ER doctor due to TSH suppression  She is feeling well.    Energy level has been good.      Weight is  stable .      No palpitations, no frequent diarrhea, or frequent constipation.    Reports fatigue, heat intolerance     Latest Reference Range & Units 02/05/25 07:30   TSH 0.350 - 5.500 uIU/mL 1.590   Free T-4 0.93 - 1.70 ng/dL 1.26   T3,Free 2.00 - 4.40 pg/mL  2.30       She is currently taking otc Vitamin D 3 5000 IU daily and calcium    Latest Reference Range & Units 02/05/25 07:30   25-Hydroxy   Vitamin D 25 30 - 100 ng/mL 56      Latest Reference Range & Units 02/05/25 07:30   Calcium 8.5 - 10.5 mg/dL 9.8   Correct Calcium 8.5 - 10.5 mg/dL 9.6      Latest Reference Range & Units 11/14/24 06:45   Phosphorus 2.5 - 4.5 mg/dL 3.6      Latest Reference Range & Units 11/14/24 06:45   Pth, Intact 14.0 - 72.0 pg/mL 69.6       3. Obesity  She is currently taking phentermine 37.5 mg daily  Her weight today is 212 lb.   She has lost over 20 lbs since she started taking phentermine.   She initially started 231 lbs.   Diet: fairly good  Activity: walking    Patient's medications, allergies, and social histories were reviewed and updated as appropriate.      ROS:      CONS:     No fever, no chills   EYES:     No diplopia, no blurry vision   CV:           No chest pain, no palpitations   PULM:     No SOB, no cough, no hemoptysis.   GI:            No nausea, no vomiting, no diarrhea, no constipation   ENDO:     No polyuria, no polydipsia, no heat intolerance, no cold intolerance       Past Medical History:  Problem List:  2024-12: Costochondritis, acute  2024-12: Chest pain  2024-12: Low serum thyroid stimulating hormone (TSH)  2024-11: History of thyroid cancer  2024-11: Vitamin D deficiency  2024-07: Malignant carcinoid tumor of bronchus and lung (HCC)  2024-04: Pain, acute postoperative  2024-04: Liver hemangioma  2024-04: Headache  2024-04: SIRS (systemic inflammatory response syndrome) (HCC)  2024-04: Solitary lung nodule  2023-08: Class 1 obesity due to excess calories without serious   comorbidity with body mass index (BMI) of 34.0 to 34.9 in adult  2023-08: Hyperlipidemia  2023-05: Leukocytosis  2023-05: Weakness  2023-05: S/P craniotomy  2023-03: Trigeminal neuralgia of right side of face  2022-10: TMJ arthralgia  2022-10: Gastroesophageal reflux disease without  "esophagitis  2020: Thyroid cancer (HCC)  2014: Rib pain on right side  2014: Migraines  2014: Anxiety and depression  Post-surgical hypothyroidism      Past Surgical History:  Past Surgical History:   Procedure Laterality Date    GA THORACOSCOPY,DX NO BX Right 2024    Procedure: RIGHT THORACOSCOPIC WEDGE RESECTION;  Surgeon: Kei Taylor M.D.;  Location: SURGERY [facility];  Service: Thoracic    GA TUBE THORACOSTOMY INCLUDES WATER SEAL Right 2024    Procedure: Chest Tube Placement;  Surgeon: Kei Taylor M.D.;  Location: SURGERY [facility];  Service: Thoracic    CRANIOTOMY Right 2023    Procedure: RIGHT CRANIOTOMY FOR MICROVASCULAR DECOMPRESSION;  Surgeon: Shruthi Echeverria M.D.;  Location: SURGERY [facility];  Service: Neurosurgery    CHOLECYSTECTOMY  2009    GYN SURGERY  2016    Endometrial ablation    OTHER  2012    Complete thyroidectomy & left neck dissection        Allergies:  Latex and Morphine     Social History:  Social History     Tobacco Use    Smoking status: Former     Current packs/day: 0.00     Types: Cigarettes     Quit date: 2009     Years since quittin.2    Smokeless tobacco: Never    Tobacco comments:     Social smoker   Vaping Use    Vaping status: Never Used   Substance Use Topics    Alcohol use: Yes     Alcohol/week: 1.2 oz     Types: 1 Glasses of wine, 1 Cans of beer per week     Comment: Occasional drinker, \"couple times a month, maybe\"    Drug use: No        Family History:   family history includes Arterial Aneurysm in her father; Breast Cancer in her maternal grandmother; Cancer in her maternal grandmother, maternal uncle, and paternal uncle; Colorectal Cancer in her paternal uncle; Diabetes in her father and paternal grandmother; Heart Disease in her father, maternal grandfather, and paternal grandfather; Hyperlipidemia in her father, maternal grandfather, mother, and paternal grandfather; Hypertension in her maternal grandfather and " "paternal grandfather; Psychiatric Illness in her father.      PHYSICAL EXAM:   Vital signs: /86 (BP Location: Left arm, Patient Position: Sitting, BP Cuff Size: Adult)   Pulse 88   Ht 1.676 m (5' 6\")   Wt 96.2 kg (212 lb)   SpO2 96%   BMI 34.22 kg/m²   GENERAL: Well-developed, well-nourished in no apparent distress.   EYE:  No ocular asymmetry, PERRLA  HENT: Pink, moist mucous membranes.    NECK: Well healed transverse scar, Thyroid is surgically absent   CARDIOVASCULAR:  No murmurs  LUNGS: Clear breath sounds  ABDOMEN: Soft, nontender   EXTREMITIES: No clubbing, cyanosis, or edema.   NEUROLOGICAL: No gross focal motor abnormalities   LYMPH: No cervical adenopathy palpated.   SKIN: No rashes, lesions.       Labs:  Lab Results   Component Value Date/Time    WBC 10.3 12/29/2024 05:14 AM    RBC 5.30 12/29/2024 05:14 AM    HEMOGLOBIN 15.4 12/29/2024 05:14 AM    MCV 84.2 12/29/2024 05:14 AM    MCH 29.1 12/29/2024 05:14 AM    MCHC 34.5 12/29/2024 05:14 AM    RDW 38.8 12/29/2024 05:14 AM    MPV 8.9 (L) 12/29/2024 05:14 AM       Lab Results   Component Value Date/Time    SODIUM 136 02/05/2025 07:30 AM    POTASSIUM 4.6 02/05/2025 07:30 AM    CHLORIDE 99 02/05/2025 07:30 AM    CO2 25 02/05/2025 07:30 AM    ANION 12.0 02/05/2025 07:30 AM    GLUCOSE 92 02/05/2025 07:30 AM    BUN 22 02/05/2025 07:30 AM    CREATININE 0.90 02/05/2025 07:30 AM    CALCIUM 9.8 02/05/2025 07:30 AM    ASTSGOT 15 02/05/2025 07:30 AM    ALTSGPT 30 02/05/2025 07:30 AM    TBILIRUBIN 0.4 02/05/2025 07:30 AM    ALBUMIN 4.3 02/05/2025 07:30 AM    TOTPROTEIN 7.8 02/05/2025 07:30 AM    GLOBULIN 3.5 02/05/2025 07:30 AM    AGRATIO 1.2 02/05/2025 07:30 AM       Lab Results   Component Value Date/Time    TSHULTRASEN 0.820 02/23/2024 0930     Lab Results   Component Value Date/Time    FREET4 1.56 02/23/2024 0930     No results found for: \"FREET3\"  No results found for: \"THYSTIMIG\"      Imaging:  US of thyroid   2/27/2025 7:52 AM     HISTORY/REASON FOR " EXAM:  Mass/Lump        TECHNIQUE/EXAM DESCRIPTION:  Ultrasound of the soft tissues of the head and neck.     COMPARISON:  None     FINDINGS:  Focused ultrasound of the area of concern left lateral neck neck demonstrates no mass, fluid collection or hematoma.     There are multiple normal lymph node with fatty hilum. There is 9 mm lymph node in the left inferior neck with no definite fatty hilum.     There is a 2.1x1.0 cm lymph node in the right superior neck with no fatty hilum.     There is a 0.9 mm hypoechoic lesion in the left parotid gland           IMPRESSION:     1. A nonenlarged but normal morphology in the left inferior neck.     2. A mildly prominent abnormal-appearing lymph node in the right superior neck.  3. Indeterminate subcentimeter lesion in the left parotid gland.    2/22/24  US-THYROID  Order: [id number]  Impression      1.  Stable, subcentimeter lymph nodes bilaterally. No concerning disease  progression or metastatic adenopathy in the interval.    Recommendations are based on the [employer] Thyroid Imaging,  Reporting and Data System (TI-RADS) committee recommendations.    TI-RADS management recommendations:    TR1: Benign (0 pts). No FNA or follow up.  TR2: Not suspicious (2 pts). No FNA or follow up.  TR3: Mildly suspicious (3 pts). FNA if nodule at least 2.5 cm; follow if at  least 1.5 cm.  TR4: Moderately suspicious (4-6 pts). FNA if nodule at least 1.5 cm; follow if  at least 1 cm.  TR5: Highly suspicious (7 or more pts). FNA if greater than 1 cm; follow if at  least 0.5 cm.    ASSESSMENT/PLAN:   1. Post-surgical hypothyroidism  stable  Medication:  Synthroid 125 mcg daily - continue  Liothyronine 5 mcg daily - continue  She is to take the Synthroid first thing every morning by itself with plain water and wait 30 minutes before eating.  We reviewed the importance of adhering to thyroid hormone replacement therapy.  We reviewed the importance of staying on brand-name  thyroid preparation for greater stability in her thyroid hormone levels.  We reviewed symptoms of under and over replacement of thyroid hormone including fatigue, weight changes, heat or cold intolerance, palpitations, tremor.  She should notify me for difficulty with such symptoms.    We will plan followup in 4 months with recheck of lab work for TSH.    In the interval, she will contact me with potential thyroid problems.  - TSH; Future  - FREE THYROXINE; Future  - T3 FREE; Future  - levothyroxine (SYNTHROID) 125 MCG Tab; Take 1 Tablet by mouth every morning on an empty stomach.  Dispense: 90 Tablet; Refill: 3    2. History of thyroid cancer  At this time, there is no evidence of recurrence.   She should notify me for new enlargement or lumps in the neck, difficulty swallowing or breathing.    We will plan repeat neck ultrasound in approximately 12 months and repeat thyroglobulin in approximately 6 months.  - THYROGLOBULIN, QT; Future    3. Class 1 obesity due to excess calories without serious comorbidity with body mass index (BMI) of 34.0 to 34.9 in adult  Unstable  Medication:  Phentermine 37.5 mg - continue  Continue diet low in fats and carbs  Continue exercising daily  - Comp Metabolic Panel; Future  - HEMOGLOBIN A1C; Future  - phentermine 37.5 MG capsule; Take 1 Capsule by mouth every morning for 30 days.  Dispense: 30 Capsule; Refill: 0    4. Vitamin D deficiency  Stable  Continue current regimen- see HPI  - VITAMIN D,25 HYDROXY (DEFICIENCY); Future     Return in about 6 months (around 9/26/2025). Patient will have blood work done prior to follow up in 6 month    Thank you kindly for allowing me to participate in the thyroid care plan for this patient.    Long Barth, JIMMIE   3/26/25    CC:   TYREL Chisholm

## 2025-03-27 RX ORDER — BUPROPION HYDROCHLORIDE 300 MG/1
300 TABLET ORAL EVERY MORNING
Qty: 90 TABLET | Refills: 0 | Status: SHIPPED | OUTPATIENT
Start: 2025-03-27

## 2025-04-11 ENCOUNTER — HOSPITAL ENCOUNTER (OUTPATIENT)
Dept: RADIOLOGY | Facility: MEDICAL CENTER | Age: 50
End: 2025-04-11
Attending: STUDENT IN AN ORGANIZED HEALTH CARE EDUCATION/TRAINING PROGRAM
Payer: COMMERCIAL

## 2025-04-11 ENCOUNTER — HOSPITAL ENCOUNTER (OUTPATIENT)
Dept: RADIOLOGY | Facility: MEDICAL CENTER | Age: 50
End: 2025-04-11
Attending: NURSE PRACTITIONER
Payer: COMMERCIAL

## 2025-04-11 DIAGNOSIS — C7A.090 MALIGNANT CARCINOID TUMOR OF BRONCHUS AND LUNG (HCC): ICD-10-CM

## 2025-04-11 DIAGNOSIS — Z85.850 HISTORY OF THYROID CANCER: ICD-10-CM

## 2025-04-11 DIAGNOSIS — R93.89 ABNORMAL ULTRASOUND OF NECK: ICD-10-CM

## 2025-04-11 PROCEDURE — 700117 HCHG RX CONTRAST REV CODE 255: Performed by: STUDENT IN AN ORGANIZED HEALTH CARE EDUCATION/TRAINING PROGRAM

## 2025-04-11 PROCEDURE — 71260 CT THORAX DX C+: CPT

## 2025-04-11 PROCEDURE — 70491 CT SOFT TISSUE NECK W/DYE: CPT

## 2025-04-11 RX ADMIN — IOHEXOL 80 ML: 350 INJECTION, SOLUTION INTRAVENOUS at 09:34

## 2025-04-14 ENCOUNTER — HOSPITAL ENCOUNTER (OUTPATIENT)
Dept: HEMATOLOGY ONCOLOGY | Facility: MEDICAL CENTER | Age: 50
End: 2025-04-14
Attending: NURSE PRACTITIONER
Payer: COMMERCIAL

## 2025-04-14 VITALS
RESPIRATION RATE: 17 BRPM | OXYGEN SATURATION: 97 % | HEART RATE: 79 BPM | WEIGHT: 217.59 LBS | TEMPERATURE: 97.2 F | BODY MASS INDEX: 34.97 KG/M2 | DIASTOLIC BLOOD PRESSURE: 62 MMHG | HEIGHT: 66 IN | SYSTOLIC BLOOD PRESSURE: 110 MMHG

## 2025-04-14 DIAGNOSIS — C7A.090 MALIGNANT CARCINOID TUMOR OF BRONCHUS AND LUNG (HCC): ICD-10-CM

## 2025-04-14 DIAGNOSIS — R59.1 LYMPHADENOPATHY: ICD-10-CM

## 2025-04-14 PROCEDURE — 99212 OFFICE O/P EST SF 10 MIN: CPT | Performed by: NURSE PRACTITIONER

## 2025-04-14 PROCEDURE — 99214 OFFICE O/P EST MOD 30 MIN: CPT | Performed by: NURSE PRACTITIONER

## 2025-04-14 ASSESSMENT — ENCOUNTER SYMPTOMS
DIARRHEA: 0
PALPITATIONS: 0
NECK PAIN: 0
SORE THROAT: 0
FEVER: 0
CONSTIPATION: 0
CHILLS: 0
VOMITING: 0
WEIGHT LOSS: 1
NAUSEA: 0
SHORTNESS OF BREATH: 0
COUGH: 0

## 2025-04-14 ASSESSMENT — FIBROSIS 4 INDEX: FIB4 SCORE: 0.51

## 2025-04-14 ASSESSMENT — PAIN SCALES - GENERAL: PAINLEVEL_OUTOF10: NO PAIN

## 2025-04-14 NOTE — PROGRESS NOTES
Subjective     Ifrah Brunson is a 50 y.o. female who presents with Cancer (Guido/ Malignant carcinoid tumor of bronchus and lung/ 9 month FV with CT results)          HPI    Patient seen today for evaluation for history of typical carcinoid tumor of the lung.  She presents unaccompanied for today's visit.    Oncology history of presenting illness:  Patient originally seen back on 1/18/2024 after referral from PCP for lung nodule. Patient established with gastroenterology physician due to abdominal complaints.  She was seen by Dr. Moreno who ordered a CT abdomen and pelvis completed on 12/26/2023.  Patient noted to have 3 hypodense lesions within the liver noted.  They did recommend MRI for follow-up as a consideration.  However incidental finding did note a 10.3 mm nodule in the right lung base.  She also did have splenomegaly on the CT scan as well as scattered colon diverticula.  She then had an MRI completed on 1/15/2024.  Confirmed that the 3 lesions on the CT scan were benign cavernous hemangiomas.  MRI did not show splenomegaly at this time.      Patient with a significant medical history. She was diagnosed with thyroid cancer in 2012. According to the patient it was stage II. She underwent a thyroidectomy with her ENT physician. She also did have a left neck dissection. She was also seen by Dr. Farooq and endocrinologist at that time. She stated that they did not believe she needed to undergo I-131 radiation. She had follow-up thyroid uptake scans since her diagnosis and have been found to be negative. In April 2023 patient was noted to have enlargement of neck lymph nodes on the right side. She did undergo biopsy at [facility] at that time. Lymph nodes were evaluated and no evidence of malignancy or papillary carcinoma on FNA at that time.      Based on imaging findings PET/CT was recommended which was completed on 2/2/2024.  PET/CT does show that the right lower lobe lung nodule does  have hypermetabolic activity with an SUV of 3.97.  Reading radiologist stated that this is concerning for malignancy.  No abnormal activity in the left lung or mediastinum.  Was also noted that patient had focal increased activity in the right ovary concerning for malignancy.  PET scan did confirm that the multiple large hypodense liver lesions were non-PET avid consistent with known hemangiomas.     Patient has had previous evaluation of the pelvis.  Her CT abdomen and pelvis with contrast on 12/26/2023 noted that the uterus and adnexal structures were not well-delineated.  Per patient she did have a transvaginal pelvic ultrasound back on 5/12/2021 which did note multiple uterine leiomyomas, but also noted a simple cyst on the right ovary that measured 1.8 cm in size.  Patient's gynecologist is in [place]. Ultrasound completed on 2/22/2024 showed no right ovarian mass or cyst identified. There were 3 heterogeneous masses within the fundus, 2 of which have possibly enlarged since her previous examination. According to the radiologist this all consists of fibroids. The endometrial stripe was not well-delineated.     CT-guided biopsy of the lung on 3/5/2024 was attempted but unfortunately was unsuccessful. According to the interventional radiologist due to combination of factors, he was unable to perform the biopsy as it was technically not feasible. Therefore, patient did undergo lung resection of the nodule with Dr. Taylor on 4/1/2024.  She had a right lower lobe lung wedge resection and pathology was consistent with typical carcinoid/neuroendocrine tumor, grade 1, measuring 1.2 cm. Ki-67 was less than 1% and margins were free of tumor. 1 lymph node was tested and found to be negative.     Plan is for surveillance which was recommended to undergo CT scan 3 months after surgery followed by 9 months after surgery (total of 1 year after surgery).  CT chest completed on 7/5/2024 showed postsurgical  changes in the right lower lobe.  No new metastatic disease within the chest, abdomen or pelvis.    Treatment history:  04/01/24: Right lower lobe lung wedge resection - Dr. Taylor    Surveillance:  07/05/25 (3 months post surgery): CT chest, abdomen and pelvis no evidence of recurrence or residual disease  04/12/25 (12 months post surgery): CT chest with contrast shows no evidence of recurrence or residual disease.  No new chest lesions.    Interval history:  Patient is doing very well overall.  She denies any coughing, wheezing or shortness of breath.  She denies any chest pain or heart palpitations.  She has been attempting to lose weight and has noticed some weight loss.  Back in February she had some pain in the left posterior cervical chain of the neck.  She had an ultrasound completed which showed mildly elevated lymph node measuring approximately 21 mm in size.  It was recommended that we do a CT neck with contrast which she recently completed.      Allergies   Allergen Reactions    Latex Rash     rash    Morphine Hives and Itching     Torso and arms       Current Outpatient Medications on File Prior to Encounter   Medication Sig Dispense Refill    buPROPion (WELLBUTRIN XL) 300 MG XL tablet TAKE 1 TABLET BY MOUTH EVERY MORNING 90 Tablet 0    phentermine 37.5 MG capsule Take 1 Capsule by mouth every morning for 30 days. 30 Capsule 0    levothyroxine (SYNTHROID) 125 MCG Tab Take 1 Tablet by mouth every morning on an empty stomach. 90 Tablet 3    citalopram (CELEXA) 20 MG Tab Take 1 Tablet by mouth every day. 30 Tablet 3    montelukast (SINGULAIR) 10 MG Tab Take 1 Tablet by mouth every day. PT RAN OUT 30 Tablet 1    pantoprazole (PROTONIX) 40 MG Tablet Delayed Response TAKE ONE TABLET BY MOUTH ONE TIME DAILY 90 Tablet 0    liothyronine (CYTOMEL) 5 MCG Tab Take 1 Tablet by mouth every day. 90 Tablet 3    albuterol 108 (90 Base) MCG/ACT Aero Soln inhalation aerosol Inhale 2 Puffs every four hours as needed for  "Shortness of Breath. 1 Each 3    acetaminophen (TYLENOL) 500 MG Tab Take 1,000 mg by mouth 3 times a day as needed for Moderate Pain.      ibuprofen (MOTRIN) 200 MG Tab Take 600 mg by mouth every 8 hours as needed for Mild Pain.      traZODone (DESYREL) 50 MG Tab Take 1 Tablet by mouth at bedtime as needed for Sleep. 90 Tablet 3    cetirizine (ZYRTEC) 10 MG Tab Take 10 mg by mouth every morning.       No current facility-administered medications on file prior to encounter.       Review of Systems   Constitutional:  Positive for weight loss (planned). Negative for chills, fever and malaise/fatigue.   HENT:  Negative for sore throat.    Respiratory:  Negative for cough and shortness of breath.    Cardiovascular:  Negative for chest pain and palpitations.   Gastrointestinal:  Negative for constipation, diarrhea, nausea and vomiting.   Genitourinary:  Negative for dysuria.   Musculoskeletal:  Negative for neck pain.              Objective     /62 (BP Location: Right arm, Patient Position: Sitting, BP Cuff Size: Adult)   Pulse 79   Temp 36.2 °C (97.2 °F) (Temporal)   Resp 17   Ht 1.676 m (5' 5.98\")   Wt 98.7 kg (217 lb 9.5 oz)   SpO2 97%   BMI 35.14 kg/m²      Physical Exam  Vitals reviewed.   Constitutional:       General: She is not in acute distress.     Appearance: Normal appearance. She is not diaphoretic.   HENT:      Head: Normocephalic and atraumatic.   Cardiovascular:      Rate and Rhythm: Normal rate and regular rhythm.      Heart sounds: Normal heart sounds. No murmur heard.     No friction rub. No gallop.   Pulmonary:      Effort: Pulmonary effort is normal. No respiratory distress.      Breath sounds: Normal breath sounds. No wheezing.   Musculoskeletal:         General: No swelling or tenderness. Normal range of motion.   Skin:     General: Skin is warm and dry.   Neurological:      Mental Status: She is alert and oriented to person, place, and time.   Psychiatric:         Mood and Affect: Mood " normal.         Behavior: Behavior normal.            CT-CHEST (THORAX) WITH  Result Date: 4/12/2025 4/11/2025 8:50 AM HISTORY/REASON FOR EXAM:  surveillance of NET. COMPARISON:  7/5/2024. TECHNIQUE/EXAM DESCRIPTION: CT scan of the chest with contrast. Thin-section helical images were obtained from the lung apices through the adrenal glands following the bolus administration of contrast. 80 mL of Omnipaque 350 nonionic contrast was utilized. Low dose optimization technique was utilized for this CT exam including automated exposure control and adjustment of the mA and/or kV according to patient size. FINDINGS: Lungs: Plates of right lung atelectasis seen on 7/5/2024 hour improvement. Linear scar density is again noted. Right lower lung ovary staples remain. No lung nodules are evident. No emphysema. Pleura: No pneumothorax or visible pleural effusion. Lymph nodes: No significant adenopathy is evident. Chest Wall: No chest wall abnormality evident. Vessels: No aortic aneurysm. Coronary artery calcification is not visible. The aorta and central pulmonary arteries enhance normally by routine imaging. Bones: No acute bony process evident. Upper abdomen: Large liver lesions consistent with benign hemangiomas are unchanged.     1. Linear scar densities in right lower lobe unchanged. Improved right base atelectasis compared with 7/5/2024. 2. No new chest lesions. 3. The visible upper abdomen again shows lesions consistent with benign hemangiomas.       CT-SOFT TISSUE NECK WITH  Result Date: 4/12/2025 4/11/2025 9:00 AM HISTORY/REASON FOR EXAM:  Mildly prominent abnormal appearing lymph node in the right superior neck on ultrasound. TECHNIQUE/EXAM DESCRIPTION AND NUMBER OF VIEWS:  CT soft tissue neck with contrast. The study was performed on a helical multidetector CT scanner. Contiguous thin section helical images were obtained of the neck from the skull base through the thoracic inlet. 80 mL of Omnipaque 350 nonionic  contrast was injected intravenously. Low dose optimization technique was utilized for this CT exam including automated exposure control and adjustment of the mA and/or kV according to patient size. COMPARISON: 2/27/2025 ultrasound showed a 2.1 x 1.0 cm lymph node in the superior right neck with no fatty hilum. FINDINGS: Lymph nodes: Prominent upper normal size upper cervical nodes especially in the upper left neck posterior chain were multiple nodes are noted (axial image 44). Node margins difficult to define making size measuring slightly difficult. Normal size left parotid lymph nodes. Nodes in summary: 18 x 9 mm level 5 posterior chain upper neck node axial image 44 21 x 8 mm upper right neck level 2 node axial image 45 and coronal image 48 17 x 7 mm upper left neck level 2 node axial image 46 and coronal image 46 The parotid, submandibular glands appear normal. No visible thyroid tissue. No mucosal lesion evident. The carotid and jugular vessels enhance normally. Great vessel origins patent. The visible skull base shows evidence for prior right occipital craniotomy. 4th ventricle appears normal. Distal orbits grossly intact. The visible skull base sinuses do not show any fluid. Mild spine degenerative changes.     1. Mildly enlarged left upper neck posterior chain level 5 lymph nodes up to 18 x 9 mm. Mildly prominent bilateral upper neck anterior chain level 2 lymph nodes. 2. No suspicious lesion in the neck otherwise. 3. Prior right posterior fossa craniotomy. Sinuses clear.        US-SOFT TISSUES OF HEAD - NECK   02/27/25  IMPRESSION:     1. A nonenlarged but normal morphology in the left inferior neck.     2. A mildly prominent abnormal-appearing lymph node in the right superior neck.  3. Indeterminate subcentimeter lesion in the left parotid gland.             Assessment & Plan     1. Malignant carcinoid tumor of bronchus and lung (HCC)  CT-CHEST (THORAX) WITH              1.  Patient with typical carcinoid  tumor of the lung status post right lower lobe wedge resection in April 2024.  She is doing very well overall.  She currently is in surveillance and has recently completed her first year of surveillance.  At this time we will continue with surveillance with annual CTs.  CT chest with contrast has been requested and will have patient follow-up in the clinic in 1 year with CT results, or sooner if needed.    2.  Patient with some mild prominent neck lymphadenopathy.  She has had this in the past and has had previous biopsies.  She does have a history of thyroid cancer and is followed by endocrinology.  I will request opinion from endocrinologist as well.  Patient stated that she no longer is having pain in that left area. Endocrinologist is Long Barth PA-C.      Please note that this dictation was created using voice recognition software. I have made every reasonable attempt to correct obvious errors, but I expect that there are errors of grammar and possibly content that I did not discover before finalizing the note.

## 2025-04-14 NOTE — Clinical Note
Walter Alves, Wanted to reach out to you regarding your mutual patient.  She has a history of thyroid cancer as you know.  She has had a previous history of lymphadenopathy and has had them biopsied in the past that have been negative.  She had a biopsy in 2023 that did not show any abnormality and a PET scan in 2024 that did not show any abnormality.  She had an ultrasound completed due to pain in the left posterior cervical chain, and subsequently CT with contrast.  I wanted to get your opinion on the most recent CT neck.  Wondering if you think any further workup at this time is recommended or if you would be willing to watch and manage these lymph nodes from an endocrinology perspective?  We are following her for the typical carcinoid of the lung and does not need to follow-up with our clinic for another year. Thanks so much for your time, JIMMIE Henderson

## 2025-05-01 ENCOUNTER — TELEPHONE (OUTPATIENT)
Dept: ENDOCRINOLOGY | Facility: MEDICAL CENTER | Age: 50
End: 2025-05-01
Payer: COMMERCIAL

## 2025-05-22 DIAGNOSIS — F32.A ANXIETY AND DEPRESSION: ICD-10-CM

## 2025-05-22 DIAGNOSIS — F41.9 ANXIETY AND DEPRESSION: ICD-10-CM

## 2025-05-22 RX ORDER — CITALOPRAM HYDROBROMIDE 20 MG/1
20 TABLET ORAL DAILY
Qty: 90 TABLET | Refills: 3 | Status: SHIPPED | OUTPATIENT
Start: 2025-05-22

## 2025-06-06 ENCOUNTER — APPOINTMENT (OUTPATIENT)
Dept: RADIOLOGY | Facility: MEDICAL CENTER | Age: 50
End: 2025-06-06
Attending: OBSTETRICS & GYNECOLOGY
Payer: COMMERCIAL

## 2025-06-09 ENCOUNTER — HOSPITAL ENCOUNTER (OUTPATIENT)
Facility: MEDICAL CENTER | Age: 50
End: 2025-06-09
Attending: INTERNAL MEDICINE
Payer: COMMERCIAL

## 2025-06-09 ENCOUNTER — OFFICE VISIT (OUTPATIENT)
Dept: MEDICAL GROUP | Facility: LAB | Age: 50
End: 2025-06-09
Payer: COMMERCIAL

## 2025-06-09 ENCOUNTER — PROCEDURE VISIT (OUTPATIENT)
Dept: ENDOCRINOLOGY | Facility: MEDICAL CENTER | Age: 50
End: 2025-06-09
Payer: COMMERCIAL

## 2025-06-09 VITALS
HEART RATE: 88 BPM | DIASTOLIC BLOOD PRESSURE: 62 MMHG | RESPIRATION RATE: 14 BRPM | SYSTOLIC BLOOD PRESSURE: 116 MMHG | HEIGHT: 66 IN | BODY MASS INDEX: 36.13 KG/M2 | OXYGEN SATURATION: 99 % | TEMPERATURE: 98 F | WEIGHT: 224.8 LBS

## 2025-06-09 DIAGNOSIS — R09.81 SINUS CONGESTION: ICD-10-CM

## 2025-06-09 DIAGNOSIS — R59.1 LYMPHADENOPATHY: ICD-10-CM

## 2025-06-09 DIAGNOSIS — G43.119 INTRACTABLE MIGRAINE WITH AURA WITHOUT STATUS MIGRAINOSUS: Primary | ICD-10-CM

## 2025-06-09 DIAGNOSIS — Z85.850 HISTORY OF THYROID CANCER: Primary | ICD-10-CM

## 2025-06-09 LAB — PATHOLOGY CONSULT NOTE: NORMAL

## 2025-06-09 PROCEDURE — 88173 CYTOPATH EVAL FNA REPORT: CPT | Performed by: PATHOLOGY

## 2025-06-09 PROCEDURE — 84432 ASSAY OF THYROGLOBULIN: CPT

## 2025-06-09 PROCEDURE — 88173 CYTOPATH EVAL FNA REPORT: CPT | Mod: 26 | Performed by: PATHOLOGY

## 2025-06-09 PROCEDURE — 3078F DIAST BP <80 MM HG: CPT | Performed by: NURSE PRACTITIONER

## 2025-06-09 PROCEDURE — 10005 FNA BX W/US GDN 1ST LES: CPT | Performed by: INTERNAL MEDICINE

## 2025-06-09 PROCEDURE — 99214 OFFICE O/P EST MOD 30 MIN: CPT | Performed by: NURSE PRACTITIONER

## 2025-06-09 PROCEDURE — 3074F SYST BP LT 130 MM HG: CPT | Performed by: NURSE PRACTITIONER

## 2025-06-09 RX ORDER — SUMATRIPTAN 50 MG/1
50 TABLET, FILM COATED ORAL
Qty: 10 TABLET | Refills: 3 | Status: SHIPPED | OUTPATIENT
Start: 2025-06-09

## 2025-06-09 RX ORDER — METHYLPREDNISOLONE 4 MG/1
TABLET ORAL
Qty: 21 TABLET | Refills: 0 | Status: SHIPPED | OUTPATIENT
Start: 2025-06-09

## 2025-06-09 ASSESSMENT — FIBROSIS 4 INDEX: FIB4 SCORE: 0.51

## 2025-06-09 NOTE — PROCEDURES
MRN [id number]    Addended on 6/9/2025 9:13:08 AM by Kei Alexandre MD.    EXAMINATION:  6/9/2025 8:38 AM    HISTORY/REASON FOR EXAM:  R upper neck LN 2.0          TECHNIQUE/EXAM DESCRIPTION:  RIGHT thyroid fine-needle aspiration with ultrasound guidance.    COMPARISON: Ultrasound 2025 which showed a 2.0 cm LN in the right upper neck  PROCEDURE:  The risks, benefits, goals and objectives and alternatives were discussed. Risks were specified as including but not limited to bleeding, infection, damage to vessels or nerves, pain and discomfort as well as nondiagnosis. The patient's questions were answered. Informed oral and written consent were obtained.    Localizing ultrasound images were obtained with the patient in supine position. The target lesion was identified and deemed suitable for ultrasound guided biopsy.     The skin was prepped and draped in the usual sterile manner. A timeout was performed.    Local anesthetic result was achieved with administration of 1% lidocaine. Under real-time ultrasound guidance, a total of 5 fine needle aspiration (FNA) biopsies were obtained with 25 G needles.    thyroglobulin was sent     Specimens were prepared in the usual fashion.    The patient tolerated the procedure well with no evidence of complication.  The skin was cleaned and a dressing was applied.    FINDINGS:  The localizing ultrasound demonstrates a 2.0 cm LN in the right upper neck    IMPRESSION:  Ultrasound-guided RIGHT thyroid fine needle aspiration.

## 2025-06-09 NOTE — PROGRESS NOTES
"Chief Complaint   Patient presents with    Headache     X 1 week R side of head      Verbal consent was acquired by the patient to use [employer] ambient listening note generation during this visit Yes      HPI:  History of Present Illness  The patient presents for evaluation of a headache.    She has been experiencing a persistent headache for the past week, originating from the site of her previous craniotomy performed 2 years ago for trigeminal neuralgia. The pain is described as tender and is exacerbated by turning her head to the right, radiating down her neck and occasionally extending into her eye. She reports no jaw pain. The intensity of the headache is severe enough to suggest a potential migraine, although it does not escalate to that level. She has been managing the pain with 600 mg of ibuprofen, which reduces the discomfort to a dull ache but does not completely alleviate it. She has attempted to contact her neurosurgeon but has not yet received a response.    She reports no associated weakness, nausea, vomiting, or changes in mental status. Occasionally, she experiences tunnel vision on the affected side, but this symptom is intermittent. She also reports mild sinus pain and pressure, initially attributed to allergies, but these symptoms have not improved with nasal spray or allergy medication. She reports no changes in hearing. She has previously taken Imitrex for migraines but has not required it for several years due to the absence of migraines.    PAST SURGICAL HISTORY:  Craniotomy for trigeminal neuralgia 2 years ago.    ROS:  As documented in history of present illness above    Exam:   /62 (BP Location: Right arm, Patient Position: Sitting, BP Cuff Size: Adult)   Pulse 88   Temp 36.7 °C (98 °F)   Resp 14   Ht 1.676 m (5' 6\")   Wt 102 kg (224 lb 12.8 oz)   SpO2 99%   BMI 36.28 kg/m²     Constitutional: Alert, no distress, well-groomed.  Skin: Warm, dry, good turgor, no rashes in " visible areas.  HEENT: Normocephalic. Eyes conjunctiva clear lids without ptosis, pupils equal and reactive to light accommodation, ears normal shape and contour, canals are clear bilaterally, tympanic membranes are benign.   Neck: Trachea midline, no masses, no thyromegaly.  Respiratory: Unlabored respiratory effort, no cough.  MSK: Normal gait, moves all extremities.  Neuro: CN II-XII intact, strength 5/5 in all muscle groups, sensation intact bilaterally, coordination intact bilaterally   Psych: Alert and oriented x3, normal affect and mood.    Assessment / Plan:  Assessment & Plan  Headache.  The headache has been present for about a week, starting at the site of a previous craniotomy for trigeminal neuralgia. It radiates down the neck and sometimes feels like a migraine, though it does not reach that intensity. There is tenderness at the craniotomy site, and the pain worsens with head movement. She has been taking 600 mg of ibuprofen, which reduces the pain to a dull ache but does not eliminate it. There is no associated unilateral weakness, nausea, vomiting, or significant changes in mental status. A prescription for Imitrex has been issued to manage potential migraine symptoms. A low-dose steroid taper has also been prescribed to address possible inner ear inflammation or lymphadenopathy contributing to the headache. The taper involves taking 6 pills on the first day, then reducing by one pill each day until completion. She is advised to follow up with her neurologist, Dr. Dubon, for further evaluation. If these treatments do not alleviate the symptoms, imaging studies will be considered.    Orders placed:  1. Intractable migraine with aura without status migrainosus (Primary)  - SUMAtriptan (IMITREX) 50 MG Tab; Take 1 Tablet by mouth one time as needed for Migraine for up to 1 dose.  Dispense: 10 Tablet; Refill: 3    2. Sinus congestion  - methylPREDNISolone (MEDROL DOSEPAK) 4 MG Tablet Therapy Pack; As  directed on the packaging label.  Dispense: 21 Tablet; Refill: 0    Please note that this dictation was created using voice recognition software. I have made every reasonable attempt to correct obvious errors, but I expect that there are errors of grammar and possibly content that I did not discover before finalizing the note.

## 2025-06-09 NOTE — PROGRESS NOTES
POC US guided FNA procedure was completed today for the R upper neck LN 2.0 cm  Please see endocrinologist procedure note  Patient tolerated procedure well without complaints.  Patient was advised that she will be contacted regarding the results and next plan  Patient was advised to follow up as planned with labs prior   Patient was advised to take tylenol or ibuprofen for pain  No restrictions were advised post procedure   Patient was advised to call for any issues post biopsy       Kei Alexandre M.D.

## 2025-06-11 ENCOUNTER — RESULTS FOLLOW-UP (OUTPATIENT)
Dept: ENDOCRINOLOGY | Facility: MEDICAL CENTER | Age: 50
End: 2025-06-11

## 2025-06-11 LAB — MISCELLANEOUS LAB RESULT MISCLAB: NORMAL

## 2025-07-03 ENCOUNTER — APPOINTMENT (OUTPATIENT)
Dept: RADIOLOGY | Facility: MEDICAL CENTER | Age: 50
End: 2025-07-03
Attending: OBSTETRICS & GYNECOLOGY
Payer: COMMERCIAL

## 2025-07-04 ENCOUNTER — APPOINTMENT (OUTPATIENT)
Dept: RADIOLOGY | Facility: MEDICAL CENTER | Age: 50
End: 2025-07-04
Attending: OBSTETRICS & GYNECOLOGY
Payer: COMMERCIAL

## 2025-07-22 DIAGNOSIS — F41.9 ANXIETY AND DEPRESSION: ICD-10-CM

## 2025-07-22 DIAGNOSIS — F32.A ANXIETY AND DEPRESSION: ICD-10-CM

## 2025-07-22 RX ORDER — BUPROPION HYDROCHLORIDE 300 MG/1
300 TABLET ORAL EVERY MORNING
Qty: 90 TABLET | Refills: 0 | Status: SHIPPED | OUTPATIENT
Start: 2025-07-22

## 2025-07-22 NOTE — TELEPHONE ENCOUNTER
Received request via: Pharmacy    Was the patient seen in the last year in this department? Yes    Does the patient have an active prescription (recently filled or refills available) for medication(s) requested? No    Pharmacy Name: [facility] [id number] [place], [place] - [address]     Does the patient have longterm Plus and need 100-day supply? (This applies to ALL medications) Patient does not have SCP

## 2025-08-01 DIAGNOSIS — K21.9 GASTROESOPHAGEAL REFLUX DISEASE WITHOUT ESOPHAGITIS: ICD-10-CM

## 2025-08-04 ENCOUNTER — APPOINTMENT (OUTPATIENT)
Dept: RADIOLOGY | Facility: MEDICAL CENTER | Age: 50
End: 2025-08-04
Attending: OBSTETRICS & GYNECOLOGY
Payer: COMMERCIAL

## 2025-08-04 VITALS — WEIGHT: 225 LBS | HEIGHT: 66 IN | BODY MASS INDEX: 36.16 KG/M2

## 2025-08-04 DIAGNOSIS — Z12.31 VISIT FOR SCREENING MAMMOGRAM: ICD-10-CM

## 2025-08-04 PROCEDURE — 77067 SCR MAMMO BI INCL CAD: CPT

## 2025-08-04 RX ORDER — PANTOPRAZOLE SODIUM 40 MG/1
40 TABLET, DELAYED RELEASE ORAL DAILY
Qty: 90 TABLET | Refills: 0 | Status: SHIPPED | OUTPATIENT
Start: 2025-08-04

## 2025-08-04 ASSESSMENT — FIBROSIS 4 INDEX: FIB4 SCORE: 0.51

## 2025-08-08 ENCOUNTER — PHARMACY VISIT (OUTPATIENT)
Dept: PHARMACY | Facility: MEDICAL CENTER | Age: 50
End: 2025-08-08
Payer: COMMERCIAL

## 2025-08-08 ENCOUNTER — OFFICE VISIT (OUTPATIENT)
Dept: NEUROLOGY | Facility: MEDICAL CENTER | Age: 50
End: 2025-08-08
Attending: PSYCHIATRY & NEUROLOGY
Payer: COMMERCIAL

## 2025-08-08 ENCOUNTER — SPECIALTY PHARMACY (OUTPATIENT)
Dept: NEUROLOGY | Facility: MEDICAL CENTER | Age: 50
End: 2025-08-08

## 2025-08-08 VITALS
OXYGEN SATURATION: 98 % | WEIGHT: 226.19 LBS | RESPIRATION RATE: 12 BRPM | TEMPERATURE: 97.7 F | HEART RATE: 75 BPM | BODY MASS INDEX: 36.35 KG/M2 | HEIGHT: 66 IN | SYSTOLIC BLOOD PRESSURE: 118 MMHG | DIASTOLIC BLOOD PRESSURE: 68 MMHG

## 2025-08-08 DIAGNOSIS — G43.009 MIGRAINE WITHOUT AURA AND WITHOUT STATUS MIGRAINOSUS, NOT INTRACTABLE: ICD-10-CM

## 2025-08-08 DIAGNOSIS — G50.0 TRIGEMINAL NEURALGIA OF RIGHT SIDE OF FACE: Primary | ICD-10-CM

## 2025-08-08 PROCEDURE — 99214 OFFICE O/P EST MOD 30 MIN: CPT | Performed by: PSYCHIATRY & NEUROLOGY

## 2025-08-08 PROCEDURE — 3074F SYST BP LT 130 MM HG: CPT | Performed by: PSYCHIATRY & NEUROLOGY

## 2025-08-08 PROCEDURE — 3078F DIAST BP <80 MM HG: CPT | Performed by: PSYCHIATRY & NEUROLOGY

## 2025-08-08 PROCEDURE — RXMED WILLOW AMBULATORY MEDICATION CHARGE: Performed by: PSYCHIATRY & NEUROLOGY

## 2025-08-08 RX ORDER — UBROGEPANT 100 MG/1
1 TABLET ORAL PRN
Qty: 6 TABLET | Refills: 0 | Status: SHIPPED | OUTPATIENT
Start: 2025-08-08 | End: 2025-08-25 | Stop reason: SDUPTHER

## 2025-08-08 RX ORDER — ONDANSETRON 4 MG/1
4 TABLET, FILM COATED ORAL EVERY 4 HOURS PRN
Qty: 20 TABLET | Refills: 4 | Status: SHIPPED | OUTPATIENT
Start: 2025-08-08

## 2025-08-08 ASSESSMENT — ENCOUNTER SYMPTOMS
MEMORY LOSS: 0
NECK PAIN: 1
DEPRESSION: 0
HEADACHES: 1

## 2025-08-08 ASSESSMENT — PATIENT HEALTH QUESTIONNAIRE - PHQ9: CLINICAL INTERPRETATION OF PHQ2 SCORE: 0

## 2025-08-08 ASSESSMENT — FIBROSIS 4 INDEX: FIB4 SCORE: 0.51

## 2025-08-25 DIAGNOSIS — G43.009 MIGRAINE WITHOUT AURA AND WITHOUT STATUS MIGRAINOSUS, NOT INTRACTABLE: ICD-10-CM

## 2025-08-25 RX ORDER — UBROGEPANT 100 MG/1
1 TABLET ORAL PRN
Qty: 16 TABLET | Refills: 5 | Status: SHIPPED | OUTPATIENT
Start: 2025-08-25

## 2025-08-25 RX ORDER — TOPIRAMATE 25 MG/1
TABLET, FILM COATED ORAL
Qty: 120 TABLET | Refills: 1 | Status: SHIPPED | OUTPATIENT
Start: 2025-08-25

## (undated) DEVICE — BAG RETRIEVAL 10ML (10EA/BX)

## (undated) DEVICE — SCALP CLIP RANEY 20-1037 (10EA/PK 20PK/CA)

## (undated) DEVICE — CANISTER SUCTION 3000ML MECHANICAL FILTER AUTO SHUTOFF MEDI-VAC NONSTERILE LF DISP  (40EA/CA)

## (undated) DEVICE — CONNECTOR BLAKE DRAIN 2-WAY - (20/BX)

## (undated) DEVICE — Device

## (undated) DEVICE — GOWN WARMING STANDARD FLEX - (30/CA)

## (undated) DEVICE — SUTURE 2-0 VICRYL CT-2  8 X 18 INCH (12EA/BX)

## (undated) DEVICE — GLOVE BIOGEL PI INDICATOR SZ 7.0 SURGICAL PF LF - (50/BX 4BX/CA)

## (undated) DEVICE — DRESSING TRANSPARENT FILM TEGADERM 2.375 X 2.75"  (100EA/BX)"

## (undated) DEVICE — TUBE CONNECT SUCTION CLEAR 120 X 1/4" (50EA/CA)"

## (undated) DEVICE — SYSTEM CHEST DRAIN ADULT/PEDS W/AUTO TRANSFUSION CAPABILITY SAHARA (6EA/CA)

## (undated) DEVICE — TUBING CLEARLINK DUO-VENT - C-FLO (48EA/CA)

## (undated) DEVICE — HEMOSTAT SURG ABSORBABLE - 4 X 8 IN SURGICEL (24EA/CA)

## (undated) DEVICE — RUBBERBAND STERILE #64 LATEX FREE (100/CA)

## (undated) DEVICE — LACTATED RINGERS INJ. 500 ML - (24EA/CA)

## (undated) DEVICE — SUTURE GENERAL

## (undated) DEVICE — BLADE CLIPPER FITS 2501 CLIPPER (BLUE)  (20EA/CA)

## (undated) DEVICE — RELOAD WITH GRIPPING SURFACE TECHNOLOGY BLUE 60MM (12EA/BX)

## (undated) DEVICE — STAPLER SKIN DISP - (6/BX 10BX/CA) VISISTAT

## (undated) DEVICE — DRAPE LARGE 3 QUARTER - (20/CA)

## (undated) DEVICE — GOWN SURGEONS LARGE - (32/CA)

## (undated) DEVICE — SOD. CHL. INJ. 0.9% 1000 ML - (14EA/CA 60CA/PF)

## (undated) DEVICE — CASSETTE IRRIGATION SONOPET IQ SUCTION (4EA/PK)

## (undated) DEVICE — DRAPE MICROSCOPE ARMATEC 120IN X 46IN (10EA/CA)

## (undated) DEVICE — TOWELS CLOTH SURGICAL - (4/PK 20PK/CA)

## (undated) DEVICE — SET EXTENSION WITH 2 PORTS (48EA/CA) ***PART #2C8610 IS A SUBSTITUTE*****

## (undated) DEVICE — INTRAOP NEURO IN OR 1:1 PER 15 MIN

## (undated) DEVICE — TISSEEL 4ML ----MUST ORDER A MIN OF 6EA----

## (undated) DEVICE — BATTERY VARISPEED

## (undated) DEVICE — KIT SURGIFLO W/OUT THROMBIN - (6EA/CA)

## (undated) DEVICE — SENSOR OXIMETER ADULT SPO2 RD SET (20EA/BX)

## (undated) DEVICE — ELECTRODE DUAL RETURN W/ CORD - (50/PK)

## (undated) DEVICE — DERMABOND ADVANCED - (12EA/BX)

## (undated) DEVICE — SUTURE 2-0 VICRYL PLUS CT-2 - 27 INCH (36/BX)

## (undated) DEVICE — CORETEMP DRAPE FORM-FITTED EASY DROPANDGO DRAPE FOR USE ON THE CORETEMP FLUID MANAGEMENT 56IN X 56IN

## (undated) DEVICE — TRAY SURESTEP FOLEY TEMP SENSING 16FR (10EA/CA) ORDER  #18764 FOR TEMP FOLEY ONLY

## (undated) DEVICE — CATHETER IV ANGIOCATH NON-SAFETY DAVINCI 14GA X 5.25 (10/BX)

## (undated) DEVICE — STAPLER 60MM ARTICULATING (3EA/BX)

## (undated) DEVICE — ANTI-FOG SOLUTION - 60BTL/CA

## (undated) DEVICE — SUCTION INSTRUMENT YANKAUER BULBOUS TIP W/O VENT (50EA/CA)

## (undated) DEVICE — SUTURE 0 SILK CT-1 (36PK/BX)

## (undated) DEVICE — GLOVE BIOGEL INDICATOR SZ 7SURGICAL PF LTX - (50/BX 4BX/CA)

## (undated) DEVICE — SET LEADWIRE 5 LEAD BEDSIDE DISPOSABLE ECG (1SET OF 5/EA)

## (undated) DEVICE — SPONGE GAUZESTER. 2X2 4-PL - (2/PK 50PK/BX 30BX/CS)

## (undated) DEVICE — STAY ELASTIC BLUNT RETRACTOR 12MM (8EA/PK)

## (undated) DEVICE — BOVIE BLADE COATED &INSULATED - 25/PK

## (undated) DEVICE — SPONGE GAUZE NON-STERILE 4X4 - (2000/CA 10PK/CA)

## (undated) DEVICE — CHLORAPREP 26 ML APPLICATOR - ORANGE TINT(25/CA)

## (undated) DEVICE — ELECTRODE 5MM LHK LAPSCP STERILE DISP- MEGADYNE  (5/CA)

## (undated) DEVICE — DRAPE IOBAN II INCISE 23X17 - (10EA/BX 4BX/CA)

## (undated) DEVICE — SPONGE GAUZE STER 4X4 8-PL - (2/PK 50PK/BX 12BX/CS)

## (undated) DEVICE — CONNECTOR HUBLESS DRAINAGE - ONE WAY (20/BX)

## (undated) DEVICE — DRAPE 36X28IN RAD CARM BND BG - (25/CA) O

## (undated) DEVICE — TOWEL STOP TIMEOUT SAFETY FLAG (40EA/CA)

## (undated) DEVICE — SODIUM CHL IRRIGATION 0.9% 1000ML (12EA/CA)

## (undated) DEVICE — LACTATED RINGERS INJ 1000 ML - (14EA/CA 60CA/PF)

## (undated) DEVICE — TOOL MR8 2.3CM F2/7CM TAPER (1/EA)

## (undated) DEVICE — GLOVE BIOGEL SZ 7 SURGICAL PF LTX - (50PR/BX 4BX/CA)

## (undated) DEVICE — TUBE CHEST SOFT STRAIGHT 24FR (10EA/BX)

## (undated) DEVICE — GLOVE BIOGEL PI ORTHO SZ 6 SURGICAL PF LF (40PR/BX)

## (undated) DEVICE — SUTURE 4-0 MONOCRYL PLUS PS-2 - 27 INCH (36/BX)

## (undated) DEVICE — CONNECTOR Y TBG CRTY 5 IN 1 STERILE (50EA/CA)

## (undated) DEVICE — GLOVE BIOGEL PI INDICATOR SZ 6.0 SURGICAL PF LF -(200PR/CA)

## (undated) DEVICE — CLEANER ELECTRO-SURGICAL TIP - (25/BX 4BX/CA)

## (undated) DEVICE — CATHETER TROCAR 24FR - (10/CA)

## (undated) DEVICE — DRAPE CHEST/BREAST - (12EA/CA)

## (undated) DEVICE — SUTURE 5-0 PROLENE BV-1 HEMOSEAL (36PK/BX)

## (undated) DEVICE — PIN HEAD MAYFIELD DISP. (3EA/PK 12PK/BX)

## (undated) DEVICE — DRESSING NON-ADHERING 8 X 3 - (50/BX)

## (undated) DEVICE — SET SUCTION/IRRIGATION WITH DISPOSABLE TIP (6/CA )PART #0250-070-520 IS A SUB

## (undated) DEVICE — TROCAR THORACOPORT SOFT 12MM (12EA/CA)

## (undated) DEVICE — BLANKET WARMING LOWER BODY (10EA/CA)

## (undated) DEVICE — PACK CRANI - (1EA/CA)

## (undated) DEVICE — SURGIFOAM (SIZE 100) - (6EA/CA)

## (undated) DEVICE — BLADE SURGICAL #10 - (50/BX)

## (undated) DEVICE — SLEEVE, VASO, THIGH, MED

## (undated) DEVICE — PACK LAP CHOLE OR - (2EA/CA)

## (undated) DEVICE — BLADE SURGICAL #15 - (50/BX 3BX/CA)

## (undated) DEVICE — TUBING C&T SET FLYING LEADS DRAIN TUBING (10EA/BX)

## (undated) DEVICE — SUTURE 4-0 NUROLON CR/8 TF - (12/BX) ETHICON

## (undated) DEVICE — SYSTEM CLEARIFY VISUALIZATION (10EA/PK)

## (undated) DEVICE — COVER LIGHT HANDLE ALC PLUS DISP (18EA/BX)

## (undated) DEVICE — CONTAINER SPECIMEN BAG OR - STERILE 4 OZ W/LID (100EA/CA)

## (undated) DEVICE — DRAPE T CRANIOTOMY W/POUCH - (9/CA)

## (undated) DEVICE — TOOL MR8 9CM ACORN 7.5MM DIAMETER (1/EA)

## (undated) DEVICE — SUTURE 0 VICRYL PLUS CT-1 - 8 X 18 INCH (12/BX)

## (undated) DEVICE — BONE WAX (12PK/BX)

## (undated) DEVICE — GLOVE SZ 7 BIOGEL PI MICRO - PF LF (50PR/BX 4BX/CA)

## (undated) DEVICE — SPONGE TELFA SURGICAL PATTIES 1/2IN X 3IN (10EA/PK 20PK/BX)

## (undated) DEVICE — DRAPESURG STERI-DRAPE LONG - (10/BX 4BX/CA)